# Patient Record
Sex: FEMALE | Race: WHITE | NOT HISPANIC OR LATINO | Employment: UNEMPLOYED | URBAN - METROPOLITAN AREA
[De-identification: names, ages, dates, MRNs, and addresses within clinical notes are randomized per-mention and may not be internally consistent; named-entity substitution may affect disease eponyms.]

---

## 2017-07-13 ENCOUNTER — HOSPITAL ENCOUNTER (EMERGENCY)
Facility: HOSPITAL | Age: 32
Discharge: HOME | End: 2017-07-13
Attending: EMERGENCY MEDICINE
Payer: MEDICAID

## 2017-07-13 VITALS
BODY MASS INDEX: 23 KG/M2 | OXYGEN SATURATION: 100 % | WEIGHT: 125 LBS | HEIGHT: 62 IN | TEMPERATURE: 98 F | HEART RATE: 61 BPM | DIASTOLIC BLOOD PRESSURE: 59 MMHG | RESPIRATION RATE: 18 BRPM | SYSTOLIC BLOOD PRESSURE: 117 MMHG

## 2017-07-13 DIAGNOSIS — T14.90XA TRAUMA: ICD-10-CM

## 2017-07-13 DIAGNOSIS — T07.XXXA MULTIPLE CONTUSIONS: Primary | ICD-10-CM
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
## 2017-07-13 PROCEDURE — 63600175 PHARM REV CODE 636 W HCPCS: Performed by: NURSE PRACTITIONER

## 2017-07-13 PROCEDURE — 99285 EMERGENCY DEPT VISIT HI MDM: CPT | Mod: 25

## 2017-07-13 PROCEDURE — 96372 THER/PROPH/DIAG INJ SC/IM: CPT

## 2017-07-13 RX ORDER — HYDROXYZINE HYDROCHLORIDE 10 MG/1
10 TABLET, FILM COATED ORAL 3 TIMES DAILY PRN
Qty: 20 TABLET | Refills: 0 | Status: SHIPPED | OUTPATIENT
Start: 2017-07-13 | End: 2017-10-06

## 2017-07-13 RX ORDER — IBUPROFEN 800 MG/1
800 TABLET ORAL EVERY 8 HOURS PRN
Qty: 30 TABLET | Refills: 0 | Status: SHIPPED | OUTPATIENT
Start: 2017-07-13 | End: 2017-10-06

## 2017-07-13 RX ORDER — METHOCARBAMOL 500 MG/1
1000 TABLET, FILM COATED ORAL 3 TIMES DAILY
Qty: 30 TABLET | Refills: 0 | Status: SHIPPED | OUTPATIENT
Start: 2017-07-13 | End: 2017-07-18

## 2017-07-13 RX ORDER — HYDROMORPHONE HYDROCHLORIDE 2 MG/ML
0.5 INJECTION, SOLUTION INTRAMUSCULAR; INTRAVENOUS; SUBCUTANEOUS
Status: COMPLETED | OUTPATIENT
Start: 2017-07-13 | End: 2017-07-13

## 2017-07-13 RX ADMIN — HYDROMORPHONE HYDROCHLORIDE 0.5 MG: 2 INJECTION INTRAMUSCULAR; INTRAVENOUS; SUBCUTANEOUS at 01:07

## 2017-07-13 NOTE — ED PROVIDER NOTES
Encounter Date: 7/13/2017    SCRIBE #1 NOTE: I, Alcon Lazaro, am scribing for, and in the presence of, Kait Rubalcava NP.       History     Chief Complaint   Patient presents with    Fall     Pt states she has right-sided stiffness due to a fall yesterday.  No LOC.  Pt ambulatory     CC: Fall    HPI: This 32 y.o. Female with hx of asthma, migraines, and anxiety presents to ED for evaluation of symptoms stemming from a fall yesterday. Pt was in her home when she tripped on detached carpet. Pt is experiencing lower back pain, neck pain, and R foot pain. No tx attempted. Pt denies LOC, cough, fever, and diaphoresis. No other symptoms reported. No alleviating factors.     LMP: last month about this time      The history is provided by the patient. No  was used.     Review of patient's allergies indicates:  No Known Allergies  Past Medical History:   Diagnosis Date    Anxiety     Asthma     as a child, no recent episodes    Mental disorder     Migraine headache     Smoker     Umbilical hernia     Vaginal delivery     x4     Past Surgical History:   Procedure Laterality Date    DILATION AND CURETTAGE OF UTERUS      HYSTEROSCOPY      TUBAL LIGATION      VAGINAL DELIVERY      x 4     Family History   Problem Relation Age of Onset    Cancer Neg Hx     Eclampsia Neg Hx      Social History   Substance Use Topics    Smoking status: Current Every Day Smoker     Packs/day: 1.00     Years: 5.00    Smokeless tobacco: Never Used    Alcohol use Yes      Comment: socially     Review of Systems   Constitutional: Negative for diaphoresis and fever.   HENT: Negative for sore throat.    Respiratory: Negative for cough and shortness of breath.    Cardiovascular: Negative for chest pain.   Gastrointestinal: Negative for nausea.   Genitourinary: Negative for dysuria.   Musculoskeletal: Positive for back pain (lower) and neck pain.        (+) R foot pain   Skin: Negative for rash.   Neurological:  Negative for syncope and weakness.   Hematological: Does not bruise/bleed easily.       Physical Exam     Initial Vitals [07/13/17 1031]   BP Pulse Resp Temp SpO2   128/72 98 18 98.4 °F (36.9 °C) 99 %      MAP       90.67         Physical Exam    Nursing note and vitals reviewed.  Constitutional: She appears well-developed and well-nourished.   HENT:   Head: Normocephalic.   Positive ecchymosis to right frontal area.   Eyes: Conjunctivae and EOM are normal. Pupils are equal, round, and reactive to light.   Neck: Normal range of motion. Neck supple.   Negative C-spine point tenderness.   Pulmonary/Chest: She exhibits no tenderness.   Abdominal: Soft. There is no tenderness.   Musculoskeletal: Normal range of motion. She exhibits edema and tenderness.   Patient has 6 centimeter area of ecchymosis to the right mid back.    Ecchymosis and mild swelling to bilateral lower legs.  Lateral left ankle has positive swelling, ecchymosis, and abrasion.  Right foot has ecchymosis over the dorsum and hematoma to the distal third toe.    Bilateral forearms have circumferential ecchymosis.    Neurological: She is alert and oriented to person, place, and time. She has normal strength and normal reflexes. She displays normal reflexes. No cranial nerve deficit or sensory deficit.   Skin: Skin is warm and dry. Capillary refill takes less than 2 seconds.         ED Course   Procedures  Labs Reviewed - No data to display          Medical Decision Making:   Initial Assessment:   32-year-old female presents with multiple areas of pain and ecchymosis after slipping on carpet and falling last p.m.  Differential Diagnosis:   Muscle strain  Fracture  Contusion  ED Management:  Pts exam was positive for diffuse contusions to include frontal area mid right back, circumferentially to the forearms bilateral lower shins right foot and left ankle.  Patient moves all extremities.  5 out of 5 strength throughout.  There is no focal neural deficit.   "Deep tendon reflexes within normal limits.    xrays and CT were reviewed and discussed with pt.     Based on exam today and the patterns of injuries I question the patient's report of slipping on her carpet and falling.  I attempted to discuss this with patient to include asking her if she was safe.  Patient continued to states she just slipped on "carpet that moves".    I will discharge patient with Robaxin and Motrin.  If given her injection of Dilaudid for pain.    Pt verbalizes understanding of d/c instructions and will return for worsening condition.    Case discussed with attending who agrees with assessment and plan.               Scribe Attestation:   Scribe #1: I performed the above scribed service and the documentation accurately describes the services I performed. I attest to the accuracy of the note.    Attending Attestation:           Physician Attestation for Scribe:  Physician Attestation Statement for Scribe #1: I, Kait Rubalcava NP, reviewed documentation, as scribed by Alcon Willis in my presence, and it is both accurate and complete.                 ED Course     Clinical Impression:   The primary encounter diagnosis was Multiple contusions. Diagnoses of Trauma and Fall, initial encounter were also pertinent to this visit.    Disposition:   Disposition: Discharged  Condition: Stable                        Kait Rubalcava NP  07/13/17 1419    "

## 2017-07-13 NOTE — ED TRIAGE NOTES
Pt c/o of RIGHT leg and foot pain. Lower back pain, neck pain after a fall down the stairs yesterday. Denies any LOC

## 2017-10-06 ENCOUNTER — OFFICE VISIT (OUTPATIENT)
Dept: OBSTETRICS AND GYNECOLOGY | Facility: CLINIC | Age: 32
End: 2017-10-06
Payer: MEDICAID

## 2017-10-06 VITALS
SYSTOLIC BLOOD PRESSURE: 110 MMHG | DIASTOLIC BLOOD PRESSURE: 64 MMHG | BODY MASS INDEX: 21.76 KG/M2 | HEIGHT: 63 IN | WEIGHT: 122.81 LBS | TEMPERATURE: 99 F

## 2017-10-06 DIAGNOSIS — Z01.419 WELL WOMAN EXAM WITH ROUTINE GYNECOLOGICAL EXAM: Primary | ICD-10-CM

## 2017-10-06 PROCEDURE — 87591 N.GONORRHOEAE DNA AMP PROB: CPT

## 2017-10-06 PROCEDURE — 99999 PR PBB SHADOW E&M-EST. PATIENT-LVL III: CPT | Mod: PBBFAC,,, | Performed by: OBSTETRICS & GYNECOLOGY

## 2017-10-06 PROCEDURE — 99395 PREV VISIT EST AGE 18-39: CPT | Mod: S$PBB,,, | Performed by: OBSTETRICS & GYNECOLOGY

## 2017-10-06 PROCEDURE — 99213 OFFICE O/P EST LOW 20 MIN: CPT | Mod: PBBFAC | Performed by: OBSTETRICS & GYNECOLOGY

## 2017-10-06 NOTE — PROGRESS NOTES
Subjective:       Patient ID: Karen Mccall is a 32 y.o. female.    Chief Complaint:  Gynecologic Exam (Last normal pap was 10/06/16)      History of Present Illness  HPI  Annual Exam-Premenopausal  Patient presents for annual exam. The patient has no complaints today. The patient is sexually active. GYN screening history: last pap: approximate date 10/6/2016 and was normal. The patient wears seatbelts: yes. The patient participates in regular exercise: yes. Has the patient ever been transfused or tattooed?: yes. The patient reports that there is not domestic violence in her life.    She is status post tubal ligation  Status post umbilical hernia repair      GYN & OB HistoryPatient's last menstrual period was 2017 (exact date).   Date of Last Pap: 10/12/2016    OB History    Para Term  AB Living   5 4 4   1 4   SAB TAB Ectopic Multiple Live Births   1     0 4      # Outcome Date GA Lbr Kemar/2nd Weight Sex Delivery Anes PTL Lv   5 Term 14 39w2d  2.432 kg (5 lb 5.8 oz) F Vag-Spont EPI N MAYDA   4 Term 10/03/12 39w0d  3.164 kg (6 lb 15.6 oz) M INDUCTION EPI N MAYDA   3 Term 05   3.317 kg (7 lb 5 oz) M Vag-Spont EPI N MAYDA   2 Term 03   3.317 kg (7 lb 5 oz) M Vag-Spont EPI N MAYDA   1 SAB 02                Past Medical History:   Diagnosis Date    Anxiety     Asthma     as a child, no recent episodes    Mental disorder     Migraine headache     Smoker     Umbilical hernia     Vaginal delivery     x4       Past Surgical History:   Procedure Laterality Date    DILATION AND CURETTAGE OF UTERUS      HYSTEROSCOPY      TUBAL LIGATION      VAGINAL DELIVERY      x 4       Family History   Problem Relation Age of Onset    Cancer Neg Hx     Eclampsia Neg Hx        Social History     Social History    Marital status: Single     Spouse name: N/A    Number of children: N/A    Years of education: N/A     Social History Main Topics    Smoking status: Current Every Day Smoker      Packs/day: 1.00     Years: 5.00    Smokeless tobacco: Never Used    Alcohol use Yes      Comment: socially    Drug use: No    Sexual activity: Yes     Partners: Male     Birth control/ protection: None     Other Topics Concern    None     Social History Narrative    Together since 2011    She is a homemaker.               Current Outpatient Prescriptions   Medication Sig Dispense Refill    amoxicillin (AMOXIL) 875 MG tablet Take 1 tablet (875 mg total) by mouth every 12 (twelve) hours. 20 tablet 0    benzonatate (TESSALON) 200 MG capsule Take 1 capsule (200 mg total) by mouth 3 (three) times daily as needed for Cough. 20 capsule 1    butalbital-acetaminophen-caffeine -40 mg (FIORICET, ESGIC) -40 mg per tablet Take 1 tablet by mouth every 12 (twelve) hours as needed for Pain (headache). 30 tablet 1    clonazePAM (KLONOPIN) 1 MG tablet 1-2 tablets PO QD prn anxiety 45 tablet 1    cyproheptadine (PERIACTIN) 4 mg tablet Take 1 tablet (4 mg total) by mouth 3 (three) times daily as needed. 60 tablet 2    escitalopram oxalate (LEXAPRO) 20 MG tablet Take 1 tablet (20 mg total) by mouth once daily. 30 tablet 3    fluticasone (FLONASE) 50 mcg/actuation nasal spray 1-2 sprays by Each Nare route once daily. 1 Bottle 0    gabapentin (NEURONTIN) 300 MG capsule Take 1 capsule (300 mg total) by mouth 2 (two) times daily. 60 capsule 5    meclizine (ANTIVERT) 25 mg tablet Take 1 tablet (25 mg total) by mouth 3 (three) times daily as needed. 30 tablet 2    topiramate (TOPAMAX) 25 MG tablet Take 1 tablet (25 mg total) by mouth 2 (two) times daily. 60 tablet 3     No current facility-administered medications for this visit.        Review of patient's allergies indicates:  No Known Allergies    Review of Systems  Review of Systems   Constitutional: Negative for activity change, appetite change, chills, fatigue, fever and unexpected weight change.   HENT: Negative for mouth sores.    Respiratory: Negative for  cough, shortness of breath and wheezing.    Cardiovascular: Negative for chest pain and palpitations.   Gastrointestinal: Negative for abdominal pain, bloating, blood in stool, constipation, nausea and vomiting.   Endocrine: Negative for diabetes and hot flashes.   Genitourinary: Negative for dyspareunia, dysuria, frequency, hematuria, menorrhagia, menstrual problem, pelvic pain, urgency, vaginal bleeding, vaginal discharge, vaginal pain, dysmenorrhea, urinary incontinence, postcoital bleeding and vaginal odor.   Musculoskeletal: Negative for back pain and myalgias.   Skin:  Negative for rash.   Neurological: Negative for seizures and headaches.   Psychiatric/Behavioral: Negative for depression and sleep disturbance. The patient is not nervous/anxious.    Breast: Negative for breast mass, breast pain and nipple discharge          Objective:    Physical Exam:   Constitutional: She appears well-developed and well-nourished. No distress.    HENT:   Head: Normocephalic and atraumatic.    Eyes: EOM are normal.    Neck: Normal range of motion.     Pulmonary/Chest: Effort normal. No respiratory distress.   Breasts: Non-tender, no engorgement, no masses, no retraction, no discharge. Negative for lymphadenopathy.         Abdominal: Soft. She exhibits no distension. There is no tenderness. There is no rebound and no guarding.     Genitourinary: Vagina normal and uterus normal. No vaginal discharge found.   Genitourinary Comments: Vulva without any obvious lesions.  Vaginal vault with good support.  Minimal discharge noted.  No obvious lesion.  Cervix is without any cervical motion tenderness.  No obvious lesion.  Uterus is small, non-tender, normal contour.  Adnexa is without any masses or tenderness.           Musculoskeletal: Normal range of motion.       Neurological: She is alert.    Skin: Skin is warm and dry.    Psychiatric: She has a normal mood and affect.          Assessment:        1. Well woman exam with routine  gynecological exam             Plan:          I have discussed with the patient her condition.  Monthly breast examination was instructed, discussed, and encouraged.  Patient was encouraged to consume a low-calorie, low fat diet, and to increase of physical activity.  Healthy habits encouraged.  A Pap smear was NOT performed.  Mammogram was not ordered because of the combination of her age and risk factors.  Gonorrhea and Chlamydia testing performed.  HIV test declined.  She will come back to see me in one year for her annual visit.  She can come back to see me sooner as necessary.  All of her questions were answered appropriately to her satisfaction.

## 2017-10-08 LAB
C TRACH DNA SPEC QL NAA+PROBE: NOT DETECTED
N GONORRHOEA DNA SPEC QL NAA+PROBE: NOT DETECTED

## 2017-11-13 ENCOUNTER — TELEPHONE (OUTPATIENT)
Dept: OBSTETRICS AND GYNECOLOGY | Facility: CLINIC | Age: 32
End: 2017-11-13

## 2017-11-13 NOTE — TELEPHONE ENCOUNTER
11/13/17 @ 1142a ( [facility])  CALL PLACED TO PT TO INFORM HER THAT DR MORENO READ HER RESULTS AND STATED THAT HER TEST ARE NEGATIVE, PT STATED HER UNDERSTANDING

## 2017-11-13 NOTE — TELEPHONE ENCOUNTER
----- Message from Abigail Calderon sent at 11/13/2017  9:22 AM CST -----  Contact: self  Patient is calling for test results and can be reached at [phone].      Thanks,  -----------------------------------------------------------------  11/13/17 @ 0935 ([facility])  SPOKE WITH MS FIELD , SHE IS REQUESTING THE RESULTS FROM HER STD TEST & HER PAP SMEAR, INFORMED HER THAT DR MORENO HAS NOT READ OR  RELEASED THOSE TEST AS YET , WILL SEND HIM A MESSAGE THAT SHE IS REQUESTING THOSE RESULTS  MESSAGE SENT TO DR MORENO & JESSENIA

## 2017-11-29 ENCOUNTER — HOSPITAL ENCOUNTER (EMERGENCY)
Facility: HOSPITAL | Age: 32
Discharge: PSYCHIATRIC OR SUBSTANCE USE FACILITY | End: 2017-11-29
Attending: EMERGENCY MEDICINE
Payer: MEDICAID

## 2017-11-29 VITALS
HEART RATE: 68 BPM | BODY MASS INDEX: 20.89 KG/M2 | DIASTOLIC BLOOD PRESSURE: 68 MMHG | HEIGHT: 66 IN | SYSTOLIC BLOOD PRESSURE: 102 MMHG | RESPIRATION RATE: 14 BRPM | TEMPERATURE: 98 F | OXYGEN SATURATION: 99 % | WEIGHT: 130 LBS

## 2017-11-29 DIAGNOSIS — R45.851 SUICIDAL IDEATIONS: Primary | ICD-10-CM

## 2017-11-29 LAB
ALBUMIN SERPL BCP-MCNC: 3.8 G/DL
ALP SERPL-CCNC: 50 U/L
ALT SERPL W/O P-5'-P-CCNC: 12 U/L
AMORPH CRY URNS QL MICRO: ABNORMAL
AMPHET+METHAMPHET UR QL: NEGATIVE
ANION GAP SERPL CALC-SCNC: 7 MMOL/L
APAP SERPL-MCNC: 5 UG/ML
AST SERPL-CCNC: 22 U/L
B-HCG UR QL: NEGATIVE
BACTERIA #/AREA URNS HPF: ABNORMAL /HPF
BARBITURATES UR QL SCN>200 NG/ML: NORMAL
BASOPHILS # BLD AUTO: 0.04 K/UL
BASOPHILS NFR BLD: 0.5 %
BENZODIAZ UR QL SCN>200 NG/ML: NEGATIVE
BILIRUB SERPL-MCNC: 0.6 MG/DL
BILIRUB UR QL STRIP: NEGATIVE
BUN SERPL-MCNC: 13 MG/DL
BZE UR QL SCN: NORMAL
CALCIUM SERPL-MCNC: 9.3 MG/DL
CANNABINOIDS UR QL SCN: NORMAL
CHLORIDE SERPL-SCNC: 109 MMOL/L
CLARITY UR: ABNORMAL
CO2 SERPL-SCNC: 28 MMOL/L
COLOR UR: YELLOW
CREAT SERPL-MCNC: 1 MG/DL
CREAT UR-MCNC: 100.2 MG/DL
DIFFERENTIAL METHOD: ABNORMAL
EOSINOPHIL # BLD AUTO: 0.1 K/UL
EOSINOPHIL NFR BLD: 1.6 %
ERYTHROCYTE [DISTWIDTH] IN BLOOD BY AUTOMATED COUNT: 13.8 %
EST. GFR  (AFRICAN AMERICAN): >60 ML/MIN/1.73 M^2
EST. GFR  (NON AFRICAN AMERICAN): >60 ML/MIN/1.73 M^2
ETHANOL SERPL-MCNC: <10 MG/DL
GLUCOSE SERPL-MCNC: 78 MG/DL
GLUCOSE UR QL STRIP: NEGATIVE
HCT VFR BLD AUTO: 33.6 %
HGB BLD-MCNC: 11.1 G/DL
HGB UR QL STRIP: NEGATIVE
HYALINE CASTS #/AREA URNS LPF: 0 /LPF
KETONES UR QL STRIP: NEGATIVE
LEUKOCYTE ESTERASE UR QL STRIP: NEGATIVE
LYMPHOCYTES # BLD AUTO: 2.5 K/UL
LYMPHOCYTES NFR BLD: 33.5 %
MCH RBC QN AUTO: 29.8 PG
MCHC RBC AUTO-ENTMCNC: 33 G/DL
MCV RBC AUTO: 90 FL
METHADONE UR QL SCN>300 NG/ML: NEGATIVE
MICROSCOPIC COMMENT: ABNORMAL
MONOCYTES # BLD AUTO: 0.4 K/UL
MONOCYTES NFR BLD: 5.6 %
NEUTROPHILS # BLD AUTO: 4.3 K/UL
NEUTROPHILS NFR BLD: 58.8 %
NITRITE UR QL STRIP: NEGATIVE
OPIATES UR QL SCN: NEGATIVE
PCP UR QL SCN>25 NG/ML: NEGATIVE
PH UR STRIP: 6 [PH] (ref 5–8)
PLATELET # BLD AUTO: 233 K/UL
PMV BLD AUTO: 10.1 FL
POTASSIUM SERPL-SCNC: 3.6 MMOL/L
PROT SERPL-MCNC: 6.3 G/DL
PROT UR QL STRIP: ABNORMAL
RBC # BLD AUTO: 3.73 M/UL
RBC #/AREA URNS HPF: 1 /HPF (ref 0–4)
SODIUM SERPL-SCNC: 144 MMOL/L
SP GR UR STRIP: 1.02 (ref 1–1.03)
SQUAMOUS #/AREA URNS HPF: 15 /HPF
TOXICOLOGY INFORMATION: NORMAL
TSH SERPL DL<=0.005 MIU/L-ACNC: 1.79 UIU/ML
URN SPEC COLLECT METH UR: ABNORMAL
UROBILINOGEN UR STRIP-ACNC: NEGATIVE EU/DL
WBC # BLD AUTO: 7.38 K/UL
WBC #/AREA URNS HPF: 1 /HPF (ref 0–5)

## 2017-11-29 PROCEDURE — 25000003 PHARM REV CODE 250: Performed by: EMERGENCY MEDICINE

## 2017-11-29 PROCEDURE — 85025 COMPLETE CBC W/AUTO DIFF WBC: CPT

## 2017-11-29 PROCEDURE — 81000 URINALYSIS NONAUTO W/SCOPE: CPT

## 2017-11-29 PROCEDURE — 63600175 PHARM REV CODE 636 W HCPCS: Performed by: EMERGENCY MEDICINE

## 2017-11-29 PROCEDURE — 80329 ANALGESICS NON-OPIOID 1 OR 2: CPT

## 2017-11-29 PROCEDURE — 84443 ASSAY THYROID STIM HORMONE: CPT

## 2017-11-29 PROCEDURE — 99285 EMERGENCY DEPT VISIT HI MDM: CPT | Mod: 25

## 2017-11-29 PROCEDURE — 80320 DRUG SCREEN QUANTALCOHOLS: CPT

## 2017-11-29 PROCEDURE — 81025 URINE PREGNANCY TEST: CPT

## 2017-11-29 PROCEDURE — 80053 COMPREHEN METABOLIC PANEL: CPT

## 2017-11-29 PROCEDURE — 80307 DRUG TEST PRSMV CHEM ANLYZR: CPT

## 2017-11-29 PROCEDURE — 96372 THER/PROPH/DIAG INJ SC/IM: CPT

## 2017-11-29 RX ORDER — DIPHENHYDRAMINE HYDROCHLORIDE 50 MG/ML
25 INJECTION INTRAMUSCULAR; INTRAVENOUS
Status: COMPLETED | OUTPATIENT
Start: 2017-11-29 | End: 2017-11-29

## 2017-11-29 RX ORDER — ZIPRASIDONE MESYLATE 20 MG/ML
20 INJECTION, POWDER, LYOPHILIZED, FOR SOLUTION INTRAMUSCULAR
Status: COMPLETED | OUTPATIENT
Start: 2017-11-29 | End: 2017-11-29

## 2017-11-29 RX ORDER — LORAZEPAM 0.5 MG/1
2 TABLET ORAL
Status: COMPLETED | OUTPATIENT
Start: 2017-11-29 | End: 2017-11-29

## 2017-11-29 RX ORDER — ACETAMINOPHEN 500 MG
1000 TABLET ORAL
Status: COMPLETED | OUTPATIENT
Start: 2017-11-29 | End: 2017-11-29

## 2017-11-29 RX ADMIN — LORAZEPAM 2 MG: 0.5 TABLET ORAL at 05:11

## 2017-11-29 RX ADMIN — DIPHENHYDRAMINE HYDROCHLORIDE 25 MG: 50 INJECTION, SOLUTION INTRAMUSCULAR; INTRAVENOUS at 06:11

## 2017-11-29 RX ADMIN — ZIPRASIDONE MESYLATE 20 MG: 20 INJECTION, POWDER, LYOPHILIZED, FOR SOLUTION INTRAMUSCULAR at 06:11

## 2017-11-29 RX ADMIN — ZIPRASIDONE MESYLATE 20 MG: 20 INJECTION, POWDER, LYOPHILIZED, FOR SOLUTION INTRAMUSCULAR at 08:11

## 2017-11-29 RX ADMIN — ACETAMINOPHEN 1000 MG: 500 TABLET ORAL at 05:11

## 2017-11-29 NOTE — ED NOTES
PT WAS GIVEN PHONE TO CALL HER KIDS FATHER TO MAKE ARRANGEMENTS FOR HER KIDS. PT WAS INFORMED THAT SHE ONLY HAS 5 MINS TO USE PHONE.

## 2017-11-29 NOTE — ED NOTES
"Pt states "I do not want any of my family here or to know I am here, they put me here because of my anxiety".   "
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
## 2017-11-29 NOTE — ED NOTES
Pt requested something for anxiety and pain for her pain in her back. Dr. Meza verbalized understanding.

## 2017-11-29 NOTE — ED NOTES
PT WAS INFORMED OF PEC STATUS . PT AT THIS TIME IS TEARFUL STATING SHE WANTS TO GO HOME TO HER KIDS. SHE HAS NOBODY TO TAKE CARE OF THEM.

## 2017-11-30 NOTE — ED NOTES
Patient up and trying to get out of bed to go smoke; redirected and explained that she is not able to smoke; tearful and redirected;

## 2017-11-30 NOTE — ED NOTES
Pt resting comfortably in bed. In no acute distress. Side rails x 2. Pt eating small bites of meal tray at a time. Will continue to monitor.

## 2017-11-30 NOTE — ED NOTES
Pt. Accepted at [facility], Dr. Ahmadi. Pt. Will be going to [facility] [address]. Report to be called in 30 minutes to [phone] ext. [phone].

## 2017-11-30 NOTE — ED NOTES
MD notified of patient agitated and getting out of bed; new orders to come; vss; patient very tearful; discussed with MD the pregnancy test and ok to change to lab test as they have urine already;

## 2017-11-30 NOTE — ED NOTES
Report called to Francoise at [facility] [phone] ext [phone]; Dr. Ahmadi is accepting

## 2017-11-30 NOTE — ED NOTES
Pt. States she needs to walk around room because her legs hurt, however I advised her to stay on stretcher as she is slurring her words and I believe she could be a fall risk if she gets up.

## 2018-01-12 ENCOUNTER — TELEPHONE (OUTPATIENT)
Dept: OBSTETRICS AND GYNECOLOGY | Facility: CLINIC | Age: 33
End: 2018-01-12

## 2018-01-12 NOTE — TELEPHONE ENCOUNTER
----- Message from Yoli Moffett sent at 1/12/2018 12:03 PM CST -----  Contact: self  Patient requests to speak with staff regarding her pap smear. She can be reached at [phone]. Thank you!

## 2018-02-20 ENCOUNTER — OFFICE VISIT (OUTPATIENT)
Dept: OBSTETRICS AND GYNECOLOGY | Facility: CLINIC | Age: 33
End: 2018-02-20
Payer: MEDICAID

## 2018-02-20 VITALS
WEIGHT: 128.31 LBS | BODY MASS INDEX: 22.73 KG/M2 | SYSTOLIC BLOOD PRESSURE: 110 MMHG | HEIGHT: 63 IN | TEMPERATURE: 99 F | DIASTOLIC BLOOD PRESSURE: 66 MMHG

## 2018-02-20 DIAGNOSIS — R53.83 FATIGUE, UNSPECIFIED TYPE: ICD-10-CM

## 2018-02-20 DIAGNOSIS — Z01.419 WELL WOMAN EXAM WITH ROUTINE GYNECOLOGICAL EXAM: Primary | ICD-10-CM

## 2018-02-20 PROCEDURE — 99213 OFFICE O/P EST LOW 20 MIN: CPT | Mod: PBBFAC | Performed by: OBSTETRICS & GYNECOLOGY

## 2018-02-20 PROCEDURE — 99395 PREV VISIT EST AGE 18-39: CPT | Mod: S$PBB,,, | Performed by: OBSTETRICS & GYNECOLOGY

## 2018-02-20 PROCEDURE — 99999 PR PBB SHADOW E&M-EST. PATIENT-LVL III: CPT | Mod: PBBFAC,,, | Performed by: OBSTETRICS & GYNECOLOGY

## 2018-02-20 NOTE — PROGRESS NOTES
Subjective:       Patient ID: Karen Mccall is a 32 y.o. female.    Chief Complaint:  Gynecologic Exam (Last normal pap was 10/06/16)      History of Present Illness  HPI  Annual Exam-Premenopausal  Patient presents for annual exam. The patient has no complaints today. The patient is sexually active. GYN screening history: last pap: approximate date 10/6/2016 and was normal. The patient wears seatbelts: yes. The patient participates in regular exercise: no. Has the patient ever been transfused or tattooed?: yes. The patient reports that there is not domestic violence in her life.    Status post laparoscopic tubal cauterization.  Regret!  Would like to have another child.    Being treated for anxiety and depression  Was doing well on Lexapro.  But switched to Paxil and Vistaril recently.  Does not like it.    Tired all the time.  Wondering if she is anemic.        GYN & OB History  Patient's last menstrual period was 2018 (exact date).   Date of Last Pap: 10/12/2016    OB History    Para Term  AB Living   5 4 4   1 4   SAB TAB Ectopic Multiple Live Births   1     0 4      # Outcome Date GA Lbr Kemar/2nd Weight Sex Delivery Anes PTL Lv   5 Term 14 39w2d  2.432 kg (5 lb 5.8 oz) F Vag-Spont EPI N MAYDA   4 Term 10/03/12 39w0d  3.164 kg (6 lb 15.6 oz) M INDUCTION EPI N MAYDA   3 Term 05   3.317 kg (7 lb 5 oz) M Vag-Spont EPI N MAYDA   2 Term 03   3.317 kg (7 lb 5 oz) M Vag-Spont EPI N MAYDA   1 SAB 02                Past Medical History:   Diagnosis Date    Anxiety     Asthma     as a child, no recent episodes    Mental disorder     Migraine headache     Smoker     Umbilical hernia     Vaginal delivery     x4       Past Surgical History:   Procedure Laterality Date    DILATION AND CURETTAGE OF UTERUS      HYSTEROSCOPY      TUBAL LIGATION      VAGINAL DELIVERY      x 4       Family History   Problem Relation Age of Onset    Cancer Neg Hx     Eclampsia Neg Hx        Social  History     Social History    Marital status: Single     Spouse name: N/A    Number of children: N/A    Years of education: N/A     Social History Main Topics    Smoking status: Current Every Day Smoker     Packs/day: 1.00     Years: 5.00    Smokeless tobacco: Never Used    Alcohol use Yes      Comment: socially    Drug use: No    Sexual activity: Yes     Partners: Male     Birth control/ protection: None     Other Topics Concern    None     Social History Narrative    Together since 2011    She is a homemaker.               Current Outpatient Prescriptions   Medication Sig Dispense Refill    butalbital-acetaminophen-caffeine -40 mg (FIORICET, ESGIC) -40 mg per tablet Take 1 tablet by mouth once daily. 30 tablet 0    clonazePAM (KLONOPIN) 0.5 MG tablet 1 tablet PO BID prn anxiety 60 tablet 0    cyclobenzaprine (FLEXERIL) 5 MG tablet Take 1 tablet (5 mg total) by mouth nightly as needed. 30 tablet 1    cyproheptadine (PERIACTIN) 4 mg tablet Take 1 tablet (4 mg total) by mouth 3 (three) times daily as needed. 60 tablet 2    fluticasone (FLONASE) 50 mcg/actuation nasal spray 1-2 sprays by Each Nare route once daily. 1 Bottle 0    gabapentin (NEURONTIN) 300 MG capsule Take 1 capsule (300 mg total) by mouth 2 (two) times daily. 60 capsule 5    hydrOXYzine pamoate (VISTARIL) 25 MG Cap Take 1 capsule (25 mg total) by mouth every 8 (eight) hours as needed. 90 capsule 1    meclizine (ANTIVERT) 25 mg tablet Take 1 tablet (25 mg total) by mouth 3 (three) times daily as needed. 30 tablet 2    naproxen (NAPROSYN) 500 MG tablet Take 1 tablet (500 mg total) by mouth 2 (two) times daily with meals. 30 tablet 1    paroxetine (PAXIL) 20 MG tablet Take 1 tablet (20 mg total) by mouth every morning. 30 tablet 5    topiramate (TOPAMAX) 25 MG tablet Take 1 tablet (25 mg total) by mouth 2 (two) times daily. 60 tablet 3     No current facility-administered medications for this visit.        Review of  patient's allergies indicates:  No Known Allergies     Review of Systems  Review of Systems   Constitutional: Negative for activity change, appetite change, chills, fatigue, fever and unexpected weight change.   HENT: Negative for mouth sores.    Respiratory: Negative for cough, shortness of breath and wheezing.    Cardiovascular: Negative for chest pain and palpitations.   Gastrointestinal: Negative for abdominal pain, bloating, blood in stool, constipation, nausea and vomiting.   Endocrine: Negative for diabetes and hot flashes.   Genitourinary: Negative for dyspareunia, dysuria, frequency, hematuria, menorrhagia, menstrual problem, pelvic pain, urgency, vaginal bleeding, vaginal discharge, vaginal pain, dysmenorrhea, urinary incontinence, postcoital bleeding and vaginal odor.   Musculoskeletal: Negative for back pain and myalgias.   Skin:  Negative for rash.   Neurological: Negative for seizures and headaches.   Psychiatric/Behavioral: Positive for depression. Negative for sleep disturbance. The patient is nervous/anxious.    Breast: Negative for breast mass, breast pain and nipple discharge          Objective:    Physical Exam:   Constitutional: She appears well-developed and well-nourished. No distress.    HENT:   Head: Normocephalic and atraumatic.    Eyes: EOM are normal.    Neck: Normal range of motion.     Pulmonary/Chest: Effort normal. No respiratory distress.   Breasts: Non-tender, no engorgement, no masses, no retraction, no discharge. Negative for lymphadenopathy.         Abdominal: Soft. She exhibits no distension. There is no tenderness. There is no rebound and no guarding.     Genitourinary: Vagina normal and uterus normal. No vaginal discharge found.   Genitourinary Comments: Vulva without any obvious lesions.  Vaginal vault with good support.  Minimal discharge noted.  No obvious lesion.  Cervix is without any cervical motion tenderness.  No obvious lesion.  Uterus is small, non-tender, normal  contour.  Adnexa is without any masses or tenderness.           Musculoskeletal: Normal range of motion.       Neurological: She is alert.    Skin: Skin is warm and dry.    Psychiatric: She has a normal mood and affect.          Assessment:        1. Well woman exam with routine gynecological exam    2. Fatigue, unspecified type             Plan:          I have discussed with the patient her condition.  Monthly breast examination was instructed, discussed, and encouraged.  Patient was encouraged to consume a low-calorie, low fat diet, and to increase of physical activity.  Healthy habits encouraged.  A Pap smear was NOT performed.  Mammogram was not ordered because of the combination of her age and risk factors.  Gonorrhea and Chlamydia testing not performed.  HIV test not ordered.  Patient is to continue her medications as prescribed.  She will come back to see me in one year for her annual visit.  She can come back to see me sooner as necessary.  All of her questions were answered appropriately to her satisfaction.     CBC, CMP, TSH,T4,Vitamin D and lipid profile ordered  We will call her for results

## 2018-02-21 ENCOUNTER — LAB VISIT (OUTPATIENT)
Dept: LAB | Facility: HOSPITAL | Age: 33
End: 2018-02-21
Attending: OBSTETRICS & GYNECOLOGY
Payer: MEDICAID

## 2018-02-21 DIAGNOSIS — R53.83 FATIGUE, UNSPECIFIED TYPE: ICD-10-CM

## 2018-02-21 DIAGNOSIS — Z01.419 WELL WOMAN EXAM WITH ROUTINE GYNECOLOGICAL EXAM: ICD-10-CM

## 2018-02-21 LAB
25(OH)D3+25(OH)D2 SERPL-MCNC: 38 NG/ML
ALBUMIN SERPL BCP-MCNC: 3.5 G/DL
ALP SERPL-CCNC: 142 U/L
ALT SERPL W/O P-5'-P-CCNC: 71 U/L
ANION GAP SERPL CALC-SCNC: 5 MMOL/L
AST SERPL-CCNC: 59 U/L
BASOPHILS # BLD AUTO: 0.03 K/UL
BASOPHILS NFR BLD: 0.7 %
BILIRUB SERPL-MCNC: 0.2 MG/DL
BUN SERPL-MCNC: 13 MG/DL
CALCIUM SERPL-MCNC: 9.4 MG/DL
CHLORIDE SERPL-SCNC: 108 MMOL/L
CHOLEST SERPL-MCNC: 102 MG/DL
CHOLEST/HDLC SERPL: 4.1 {RATIO}
CO2 SERPL-SCNC: 28 MMOL/L
CREAT SERPL-MCNC: 0.9 MG/DL
DIFFERENTIAL METHOD: ABNORMAL
EOSINOPHIL # BLD AUTO: 0.1 K/UL
EOSINOPHIL NFR BLD: 2.4 %
ERYTHROCYTE [DISTWIDTH] IN BLOOD BY AUTOMATED COUNT: 13 %
EST. GFR  (AFRICAN AMERICAN): >60 ML/MIN/1.73 M^2
EST. GFR  (NON AFRICAN AMERICAN): >60 ML/MIN/1.73 M^2
GLUCOSE SERPL-MCNC: 68 MG/DL
HCT VFR BLD AUTO: 33.8 %
HDLC SERPL-MCNC: 25 MG/DL
HDLC SERPL: 24.5 %
HGB BLD-MCNC: 10.9 G/DL
LDLC SERPL CALC-MCNC: 61.6 MG/DL
LYMPHOCYTES # BLD AUTO: 0.9 K/UL
LYMPHOCYTES NFR BLD: 19.7 %
MCH RBC QN AUTO: 29.1 PG
MCHC RBC AUTO-ENTMCNC: 32.2 G/DL
MCV RBC AUTO: 90 FL
MONOCYTES # BLD AUTO: 0.4 K/UL
MONOCYTES NFR BLD: 9.8 %
NEUTROPHILS # BLD AUTO: 3 K/UL
NEUTROPHILS NFR BLD: 67.2 %
NONHDLC SERPL-MCNC: 77 MG/DL
PLATELET # BLD AUTO: 205 K/UL
PMV BLD AUTO: 10 FL
POTASSIUM SERPL-SCNC: 4.5 MMOL/L
PROT SERPL-MCNC: 6.6 G/DL
RBC # BLD AUTO: 3.74 M/UL
SODIUM SERPL-SCNC: 141 MMOL/L
T4 FREE SERPL-MCNC: 0.93 NG/DL
TRIGL SERPL-MCNC: 77 MG/DL
TSH SERPL DL<=0.005 MIU/L-ACNC: 0.59 UIU/ML
WBC # BLD AUTO: 4.51 K/UL

## 2018-02-21 PROCEDURE — 85025 COMPLETE CBC W/AUTO DIFF WBC: CPT

## 2018-02-21 PROCEDURE — 36415 COLL VENOUS BLD VENIPUNCTURE: CPT

## 2018-02-21 PROCEDURE — 80053 COMPREHEN METABOLIC PANEL: CPT

## 2018-02-21 PROCEDURE — 84439 ASSAY OF FREE THYROXINE: CPT

## 2018-02-21 PROCEDURE — 82306 VITAMIN D 25 HYDROXY: CPT

## 2018-02-21 PROCEDURE — 84443 ASSAY THYROID STIM HORMONE: CPT

## 2018-02-21 PROCEDURE — 80061 LIPID PANEL: CPT

## 2018-02-23 ENCOUNTER — TELEPHONE (OUTPATIENT)
Dept: OBSTETRICS AND GYNECOLOGY | Facility: CLINIC | Age: 33
End: 2018-02-23

## 2018-02-23 NOTE — TELEPHONE ENCOUNTER
----- Message from Yoli Moffett sent at 2/22/2018  2:40 PM CST -----  Contact: self  Patient requests to get a copy of her recent labs to bring her PCP. She can be reached at [phone]. Thank you!

## 2018-10-09 ENCOUNTER — HOSPITAL ENCOUNTER (EMERGENCY)
Facility: HOSPITAL | Age: 33
Discharge: HOME | End: 2018-10-09
Attending: EMERGENCY MEDICINE
Payer: MEDICAID

## 2018-10-09 VITALS
DIASTOLIC BLOOD PRESSURE: 66 MMHG | BODY MASS INDEX: 23.21 KG/M2 | HEIGHT: 63 IN | HEART RATE: 84 BPM | RESPIRATION RATE: 18 BRPM | TEMPERATURE: 99 F | WEIGHT: 131 LBS | SYSTOLIC BLOOD PRESSURE: 103 MMHG | OXYGEN SATURATION: 98 %

## 2018-10-09 DIAGNOSIS — N76.0 BACTERIAL VAGINOSIS: Primary | ICD-10-CM

## 2018-10-09 DIAGNOSIS — B96.89 BACTERIAL VAGINOSIS: Primary | ICD-10-CM

## 2018-10-09 DIAGNOSIS — N72 CERVICITIS: ICD-10-CM

## 2018-10-09 DIAGNOSIS — R10.2 SUPRAPUBIC ABDOMINAL PAIN: ICD-10-CM

## 2018-10-09 DIAGNOSIS — N30.01 ACUTE CYSTITIS WITH HEMATURIA: ICD-10-CM

## 2018-10-09 DIAGNOSIS — R11.0 NAUSEA: ICD-10-CM

## 2018-10-09 LAB
ALBUMIN SERPL BCP-MCNC: 4.2 G/DL
ALP SERPL-CCNC: 63 U/L
ALT SERPL W/O P-5'-P-CCNC: 12 U/L
ANION GAP SERPL CALC-SCNC: 9 MMOL/L
AST SERPL-CCNC: 27 U/L
B-HCG UR QL: NEGATIVE
BACTERIA #/AREA URNS HPF: ABNORMAL /HPF
BACTERIA GENITAL QL WET PREP: ABNORMAL
BASOPHILS # BLD AUTO: 0.01 K/UL
BASOPHILS NFR BLD: 0.1 %
BILIRUB SERPL-MCNC: 1.1 MG/DL
BILIRUB UR QL STRIP: NEGATIVE
BUN SERPL-MCNC: 12 MG/DL
CALCIUM SERPL-MCNC: 9.6 MG/DL
CHLORIDE SERPL-SCNC: 106 MMOL/L
CLARITY UR: CLEAR
CLUE CELLS VAG QL WET PREP: ABNORMAL
CO2 SERPL-SCNC: 22 MMOL/L
COLOR UR: YELLOW
CREAT SERPL-MCNC: 0.9 MG/DL
CTP QC/QA: YES
DIFFERENTIAL METHOD: ABNORMAL
EOSINOPHIL # BLD AUTO: 0 K/UL
EOSINOPHIL NFR BLD: 0 %
ERYTHROCYTE [DISTWIDTH] IN BLOOD BY AUTOMATED COUNT: 13.3 %
EST. GFR  (AFRICAN AMERICAN): >60 ML/MIN/1.73 M^2
EST. GFR  (NON AFRICAN AMERICAN): >60 ML/MIN/1.73 M^2
FILAMENT FUNGI VAG WET PREP-#/AREA: ABNORMAL
GLUCOSE SERPL-MCNC: 105 MG/DL
GLUCOSE UR QL STRIP: NEGATIVE
HCT VFR BLD AUTO: 35.8 %
HGB BLD-MCNC: 12.3 G/DL
HGB UR QL STRIP: NEGATIVE
HYALINE CASTS #/AREA URNS LPF: 0 /LPF
KETONES UR QL STRIP: ABNORMAL
LEUKOCYTE ESTERASE UR QL STRIP: ABNORMAL
LIPASE SERPL-CCNC: 26 U/L
LYMPHOCYTES # BLD AUTO: 0.6 K/UL
LYMPHOCYTES NFR BLD: 4.4 %
MCH RBC QN AUTO: 30.1 PG
MCHC RBC AUTO-ENTMCNC: 34.4 G/DL
MCV RBC AUTO: 88 FL
MICROSCOPIC COMMENT: ABNORMAL
MONOCYTES # BLD AUTO: 0.6 K/UL
MONOCYTES NFR BLD: 3.9 %
NEUTROPHILS # BLD AUTO: 13 K/UL
NEUTROPHILS NFR BLD: 91.6 %
NITRITE UR QL STRIP: NEGATIVE
PH UR STRIP: 7 [PH] (ref 5–8)
PLATELET # BLD AUTO: 250 K/UL
PMV BLD AUTO: 10.3 FL
POTASSIUM SERPL-SCNC: 3.6 MMOL/L
PROT SERPL-MCNC: 7.5 G/DL
PROT UR QL STRIP: ABNORMAL
RBC # BLD AUTO: 4.09 M/UL
RBC #/AREA URNS HPF: 2 /HPF (ref 0–4)
SODIUM SERPL-SCNC: 137 MMOL/L
SP GR UR STRIP: 1.02 (ref 1–1.03)
SPECIMEN SOURCE: ABNORMAL
SQUAMOUS #/AREA URNS HPF: 4 /HPF
T VAGINALIS GENITAL QL WET PREP: ABNORMAL
URN SPEC COLLECT METH UR: ABNORMAL
UROBILINOGEN UR STRIP-ACNC: ABNORMAL EU/DL
WBC # BLD AUTO: 14.24 K/UL
WBC #/AREA URNS HPF: 20 /HPF (ref 0–5)
WBC #/AREA VAG WET PREP: ABNORMAL
YEAST GENITAL QL WET PREP: ABNORMAL

## 2018-10-09 PROCEDURE — 86592 SYPHILIS TEST NON-TREP QUAL: CPT

## 2018-10-09 PROCEDURE — 87086 URINE CULTURE/COLONY COUNT: CPT

## 2018-10-09 PROCEDURE — 87210 SMEAR WET MOUNT SALINE/INK: CPT

## 2018-10-09 PROCEDURE — 86593 SYPHILIS TEST NON-TREP QUANT: CPT

## 2018-10-09 PROCEDURE — 83690 ASSAY OF LIPASE: CPT

## 2018-10-09 PROCEDURE — 85025 COMPLETE CBC W/AUTO DIFF WBC: CPT

## 2018-10-09 PROCEDURE — 25000003 PHARM REV CODE 250: Performed by: PHYSICIAN ASSISTANT

## 2018-10-09 PROCEDURE — 86780 TREPONEMA PALLIDUM: CPT

## 2018-10-09 PROCEDURE — 96372 THER/PROPH/DIAG INJ SC/IM: CPT | Mod: 59

## 2018-10-09 PROCEDURE — 81025 URINE PREGNANCY TEST: CPT | Performed by: NURSE PRACTITIONER

## 2018-10-09 PROCEDURE — 81000 URINALYSIS NONAUTO W/SCOPE: CPT

## 2018-10-09 PROCEDURE — 96374 THER/PROPH/DIAG INJ IV PUSH: CPT

## 2018-10-09 PROCEDURE — 96361 HYDRATE IV INFUSION ADD-ON: CPT

## 2018-10-09 PROCEDURE — 87491 CHLMYD TRACH DNA AMP PROBE: CPT

## 2018-10-09 PROCEDURE — 80053 COMPREHEN METABOLIC PANEL: CPT

## 2018-10-09 PROCEDURE — 63600175 PHARM REV CODE 636 W HCPCS: Performed by: PHYSICIAN ASSISTANT

## 2018-10-09 PROCEDURE — 99284 EMERGENCY DEPT VISIT MOD MDM: CPT | Mod: 25

## 2018-10-09 RX ORDER — METRONIDAZOLE 500 MG/1
500 TABLET ORAL 2 TIMES DAILY
Qty: 14 TABLET | Refills: 0 | Status: SHIPPED | OUTPATIENT
Start: 2018-10-09 | End: 2018-10-16

## 2018-10-09 RX ORDER — CEPHALEXIN 500 MG/1
500 CAPSULE ORAL 2 TIMES DAILY
Qty: 14 CAPSULE | Refills: 0 | Status: SHIPPED | OUTPATIENT
Start: 2018-10-09 | End: 2018-10-16

## 2018-10-09 RX ORDER — CEFTRIAXONE 500 MG/1
250 INJECTION, POWDER, FOR SOLUTION INTRAMUSCULAR; INTRAVENOUS
Status: COMPLETED | OUTPATIENT
Start: 2018-10-09 | End: 2018-10-09

## 2018-10-09 RX ORDER — AZITHROMYCIN 250 MG/1
1000 TABLET, FILM COATED ORAL
Status: COMPLETED | OUTPATIENT
Start: 2018-10-09 | End: 2018-10-09

## 2018-10-09 RX ORDER — ONDANSETRON 4 MG/1
4 TABLET, ORALLY DISINTEGRATING ORAL
Status: COMPLETED | OUTPATIENT
Start: 2018-10-09 | End: 2018-10-09

## 2018-10-09 RX ORDER — KETOROLAC TROMETHAMINE 30 MG/ML
15 INJECTION, SOLUTION INTRAMUSCULAR; INTRAVENOUS
Status: COMPLETED | OUTPATIENT
Start: 2018-10-09 | End: 2018-10-09

## 2018-10-09 RX ORDER — ONDANSETRON 4 MG/1
4 TABLET, FILM COATED ORAL EVERY 6 HOURS PRN
Qty: 10 TABLET | Refills: 0 | Status: SHIPPED | OUTPATIENT
Start: 2018-10-09 | End: 2019-11-22 | Stop reason: CLARIF

## 2018-10-09 RX ORDER — ACETAMINOPHEN 500 MG
1000 TABLET ORAL
Status: COMPLETED | OUTPATIENT
Start: 2018-10-09 | End: 2018-10-09

## 2018-10-09 RX ADMIN — KETOROLAC TROMETHAMINE 15 MG: 30 INJECTION, SOLUTION INTRAMUSCULAR at 01:10

## 2018-10-09 RX ADMIN — ONDANSETRON 4 MG: 4 TABLET, ORALLY DISINTEGRATING ORAL at 01:10

## 2018-10-09 RX ADMIN — AZITHROMYCIN MONOHYDRATE 1000 MG: 250 TABLET ORAL at 02:10

## 2018-10-09 RX ADMIN — SODIUM CHLORIDE 1000 ML: 0.9 INJECTION, SOLUTION INTRAVENOUS at 01:10

## 2018-10-09 RX ADMIN — ACETAMINOPHEN 1000 MG: 500 TABLET, FILM COATED ORAL at 01:10

## 2018-10-09 RX ADMIN — CEFTRIAXONE SODIUM 250 MG: 500 INJECTION, POWDER, FOR SOLUTION INTRAMUSCULAR; INTRAVENOUS at 02:10

## 2018-10-09 NOTE — ED TRIAGE NOTES
Patient reports abdominal pain with N/V that started last night. Denies diarrhea. Last BM yesterday. Reports taking Ibuprofen 800mg last night and Tylenol this morning with no relief.

## 2018-10-09 NOTE — ED PROVIDER NOTES
Encounter Date: 10/9/2018     This is a 33 y.o. female complaining of lower abdominal pain with associated nausea and vomiting that began yesterday. Febrile at triage.    I have evaluated and conducted a medical screening exam with initial orders entered, if indicated, to expedite care. The patient will be placed in a treatment area when one is available. Care will be transferred to an alternate provider for a full assessment including but not limited to: history, physical exam, additional orders, and final disposition.    Maxi Duran NP      SCRIBE #1 NOTE: [place]awood am scribing for, and in the presence of,  Cindy Henry PA-C. I have scribed the following portions of the note - Other sections scribed: HPI, ROS.       History     Chief Complaint   Patient presents with    Abdominal Pain     States since last night she has had abd pain and n/v  Denies diarrhea    Nausea    Vomiting     CC: Abdominal Pain ; Nausea     HPI  The patient is a 32 y/o female smoker with past medical hx of anxiety, asthma, mental disorder, migraine, umbilical hernia, and x4 vaginal delivery that presents for emergent consideration of acute, severe (10/10) lower abdominal pain with nausea and vomiting that began last night. She reports x6-7 episodes of emesis since onset of her pain. Her last BM was x1-2 days ago. She is also c/o vaginal discharge that began last night. Pt otherwise denies diarrhea, constipation, dysuria, pressure, or new sexual partners. She attempted treatment with ibuprofen last night and then tylenol today PTA without relief of her pain.       The history is provided by the patient. No  was used.     Review of patient's allergies indicates:  No Known Allergies  Past Medical History:   Diagnosis Date    Anxiety     Asthma     as a child, no recent episodes    GERD (gastroesophageal reflux disease)     Mental disorder     Migraine headache     Smoker     Umbilical hernia      Vaginal delivery     x4     Past Surgical History:   Procedure Laterality Date    DILATION AND CURETTAGE OF UTERUS      HYSTEROSCOPY      [id number] Bilateral 3/20/2015    [facility]    REPAIR-HERNIA-UMBILICAL N/A 10/24/2016    [facility] at [facility] OR    TUBAL LIGATION      VAGINAL DELIVERY      x 4     Family History   Problem Relation Age of Onset    Cancer Neg Hx     Eclampsia Neg Hx      Social History     Tobacco Use    Smoking status: Current Every Day Smoker     Packs/day: 1.00     Years: 5.00     Pack years: 5.00    Smokeless tobacco: Never Used   Substance Use Topics    Alcohol use: Yes     Comment: socially    Drug use: No     Review of Systems   Constitutional: Negative for appetite change and fever.   HENT: Negative for congestion, rhinorrhea and sore throat.    Eyes: Negative for pain and visual disturbance.   Respiratory: Negative for cough and shortness of breath.    Cardiovascular: Negative for chest pain.   Gastrointestinal: Positive for abdominal pain, nausea and vomiting. Negative for blood in stool, constipation and diarrhea.   Genitourinary: Positive for vaginal discharge. Negative for difficulty urinating, dysuria, frequency and hematuria.   Musculoskeletal: Negative for gait problem and neck pain.   Skin: Negative for rash.   Neurological: Negative for dizziness, syncope, light-headedness, numbness and headaches.   Psychiatric/Behavioral: Negative for confusion.   All other systems reviewed and are negative.      Physical Exam     Initial Vitals [10/09/18 1214]   BP Pulse Resp Temp SpO2   133/76 108 16 (!) 100.9 °F (38.3 °C) 100 %      MAP       --         Physical Exam    Nursing note and vitals reviewed.  Constitutional: Vital signs are normal. She appears well-developed and well-nourished. She is not diaphoretic. She is cooperative.  Non-toxic appearance. She does not have a sickly appearance. She does not  appear ill. No distress.   HENT:   Head: Normocephalic and atraumatic.   Right Ear: External ear normal.   Left Ear: External ear normal.   Nose: Nose normal.   Mouth/Throat: Oropharynx is clear and moist. No oropharyngeal exudate.   Eyes: Conjunctivae, EOM and lids are normal. Pupils are equal, round, and reactive to light.   Neck: Trachea normal, normal range of motion, full passive range of motion without pain and phonation normal. Neck supple.   Cardiovascular: Normal rate, regular rhythm, normal heart sounds and intact distal pulses. Exam reveals no gallop and no friction rub.    No murmur heard.  Pulmonary/Chest: Effort normal and breath sounds normal. No respiratory distress. She has no decreased breath sounds. She has no wheezes. She has no rhonchi. She has no rales.   Abdominal: Soft. Normal appearance and bowel sounds are normal. She exhibits no distension and no mass. There is tenderness in the suprapubic area. There is no rigidity, no rebound, no guarding and no CVA tenderness.   Genitourinary: Pelvic exam was performed with patient supine. There is no rash, tenderness, lesion or injury on the right labia. There is no rash, tenderness, lesion or injury on the left labia. Cervix exhibits no motion tenderness, no discharge and no friability. Right adnexum displays no tenderness. Left adnexum displays no tenderness. No erythema, tenderness (moderate yellow milky) or bleeding in the vagina. No foreign body in the vagina. No signs of injury around the vagina. Vaginal discharge found.   Musculoskeletal: Normal range of motion.   Neurological: She is alert and oriented to person, place, and time. She has normal strength.   Skin: Skin is warm and dry. Capillary refill takes less than 2 seconds. No rash noted.   Psychiatric: She has a normal mood and affect. Her speech is normal and behavior is normal. Judgment and thought content normal. Cognition and memory are normal.         ED Course   Procedures  Labs  Reviewed   CBC W/ AUTO DIFFERENTIAL - Abnormal; Notable for the following components:       Result Value    WBC 14.24 (*)     Hematocrit 35.8 (*)     Gran # (ANC) 13.0 (*)     Lymph # 0.6 (*)     Gran% 91.6 (*)     Lymph% 4.4 (*)     Mono% 3.9 (*)     All other components within normal limits   COMPREHENSIVE METABOLIC PANEL - Abnormal; Notable for the following components:    CO2 22 (*)     Total Bilirubin 1.1 (*)     All other components within normal limits   URINALYSIS, REFLEX TO URINE CULTURE - Abnormal; Notable for the following components:    Protein, UA 1+ (*)     Ketones, UA Trace (*)     Urobilinogen, UA 2.0-3.0 (*)     Leukocytes, UA 3+ (*)     All other components within normal limits    Narrative:     Preferred Collection Type->Urine, Clean Catch   VAGINAL SCREEN - Abnormal; Notable for the following components:    Clue Cells, Wet Prep Rare (*)     WBC - Vaginal Screen Few (*)     Bacteria - Vaginal Screen Few (*)     All other components within normal limits   URINALYSIS MICROSCOPIC - Abnormal; Notable for the following components:    WBC, UA 20 (*)     All other components within normal limits    Narrative:     Preferred Collection Type->Urine, Clean Catch   C. TRACHOMATIS/N. GONORRHOEAE BY AMP DNA   CULTURE, URINE   LIPASE   RPR   POCT URINE PREGNANCY          Imaging Results    None                APC / Resident Notes:   This is an evaluation of a 33 y.o. female that presents to the Emergency Department for abdominal pain, nausea and emesis. Patient reports lower abdominal pain that radiates to the lower back with associated nausea and emesis that began last night. She reports using a douche, followed by onset of symptoms. She also reports noticing vaginal discharge yesterday. She denies a new sexual partner. She reports attempted tx with Tylenol and Ibuprofen last night.   After initial inquery, patient reported to Dr. Gonzalez that she had an unprotected sexual encounter with an individual who the  health department contacted her regarding possible syphilis exposure.    Exam findings: Patient is non-toxic, febrile and well appearing.  The abdomen is soft and nondistended.  There is generalized tenderness of the suprapubic region.  No peritoneal signs. Bowel sounds are appreciated.  exam witnessed by Mavis Belle RN. The external genitalia appears normal. There is moderate yellow discharge. No bleeding or foreign body.  The cervical os is closed.  There is no cervical motion tenderness, cervical discharge or cervical friability.  There is no adnexal tenderness.    If available, past records have been reviewed.  Vitals are reassuring.  Results:   UPT negative  UA revealing for UTI.  Vaginal screen revealing for clue cells.  GC culture pending.  CBC shows leukocytosis (WBC 14.21)  CMP and lipase unrevealing.  RPR pending.    My overall impression: cervicitis, suprapubic pain, bacterial vaginosis, UTI, nausea  DDx: cervicitis, BV, candidiasis, trichomonas, STI, UTI, abdominal pain, pyelonephritis, other    ED course: IV fluid rehydration with 1 L, Zofran, Tylenol, Motrin. I will treat cervicitis with Ceftriaxone and Azithromycin. I will prescribed Flagyl and Keflex and recommend close follow-up with OBGYN and PCP. I will recommend that patient continue Tylenol and/or Motrin for fever control. I have informed patient that we will contact her if RPR is positive. Strict ED return precautions given.    The diagnosis and treatment plan have been discussed with the patient. All questions and concerns have been addressed. Patient expressed understanding. An educational information sheet was given to the patient prior to discharge.     This case has been discussed with Dr. Gonzalez and she is in agreement of the diagnosis and treatment plan.       Cindy Godinez PA-C         Scribe Attestation:   Scribe #1: I performed the above scribed service and the documentation accurately describes the services I performed. I  attest to the accuracy of the note.    Attending Attestation:           Physician Attestation for Scribe:  Physician Attestation Statement for Scribe #1: I, Cindy Henry PA-C, reviewed documentation, as scribed by Dawood Nunez in my presence, and it is both accurate and complete.                    Clinical Impression:   The primary encounter diagnosis was Bacterial vaginosis. Diagnoses of Cervicitis, Suprapubic abdominal pain, Acute cystitis with hematuria, and Nausea were also pertinent to this visit.      Disposition:   Disposition: Discharged  Condition: Stable                        Cindy Henry PA-C  10/09/18 1600

## 2018-10-09 NOTE — DISCHARGE INSTRUCTIONS
Please take the antibiotics as prescribed.    Please follow-up with OBGYN if your symptoms continue.    Return to the emergency department for any concerns.

## 2018-10-10 ENCOUNTER — PES CALL (OUTPATIENT)
Dept: ADMINISTRATIVE | Facility: CLINIC | Age: 33
End: 2018-10-10

## 2018-10-10 LAB
C TRACH DNA SPEC QL NAA+PROBE: NOT DETECTED
N GONORRHOEA DNA SPEC QL NAA+PROBE: DETECTED

## 2018-10-11 LAB
BACTERIA UR CULT: NORMAL
RPR SER QL: REACTIVE
RPR SER-TITR: ABNORMAL {TITER}

## 2018-10-15 ENCOUNTER — HOSPITAL ENCOUNTER (EMERGENCY)
Facility: HOSPITAL | Age: 33
Discharge: HOME | End: 2018-10-15
Attending: EMERGENCY MEDICINE
Payer: MEDICAID

## 2018-10-15 VITALS
SYSTOLIC BLOOD PRESSURE: 118 MMHG | OXYGEN SATURATION: 99 % | RESPIRATION RATE: 20 BRPM | BODY MASS INDEX: 22.2 KG/M2 | HEIGHT: 64 IN | TEMPERATURE: 99 F | DIASTOLIC BLOOD PRESSURE: 77 MMHG | WEIGHT: 130 LBS | HEART RATE: 77 BPM

## 2018-10-15 DIAGNOSIS — R10.2 PELVIC PAIN: ICD-10-CM

## 2018-10-15 DIAGNOSIS — Z20.2 STD EXPOSURE: Primary | ICD-10-CM

## 2018-10-15 LAB
B-HCG UR QL: NEGATIVE
CTP QC/QA: YES
T PALLIDUM AB SER QL IF: REACTIVE

## 2018-10-15 PROCEDURE — 63600175 PHARM REV CODE 636 W HCPCS: Performed by: PHYSICIAN ASSISTANT

## 2018-10-15 PROCEDURE — 96372 THER/PROPH/DIAG INJ SC/IM: CPT

## 2018-10-15 PROCEDURE — 25000003 PHARM REV CODE 250: Performed by: PHYSICIAN ASSISTANT

## 2018-10-15 PROCEDURE — 99284 EMERGENCY DEPT VISIT MOD MDM: CPT | Mod: 25

## 2018-10-15 PROCEDURE — 81025 URINE PREGNANCY TEST: CPT | Performed by: PHYSICIAN ASSISTANT

## 2018-10-15 RX ORDER — AZITHROMYCIN 250 MG/1
1000 TABLET, FILM COATED ORAL
Status: COMPLETED | OUTPATIENT
Start: 2018-10-15 | End: 2018-10-15

## 2018-10-15 RX ORDER — IBUPROFEN 600 MG/1
600 TABLET ORAL EVERY 6 HOURS PRN
Qty: 20 TABLET | Refills: 0 | Status: SHIPPED | OUTPATIENT
Start: 2018-10-15 | End: 2019-11-22 | Stop reason: CLARIF

## 2018-10-15 RX ORDER — CEFTRIAXONE 250 MG/1
250 INJECTION, POWDER, FOR SOLUTION INTRAMUSCULAR; INTRAVENOUS
Status: COMPLETED | OUTPATIENT
Start: 2018-10-15 | End: 2018-10-15

## 2018-10-15 RX ADMIN — AZITHROMYCIN MONOHYDRATE 1000 MG: 250 TABLET ORAL at 09:10

## 2018-10-15 RX ADMIN — CEFTRIAXONE SODIUM 250 MG: 250 INJECTION, POWDER, FOR SOLUTION INTRAMUSCULAR; INTRAVENOUS at 09:10

## 2018-10-16 NOTE — ED TRIAGE NOTES
Patient arrived to ED with c/o RLQ abd pain x 6 days.  Reports that she was treated chlamydia when she was seen on the 8th here in the ED and went home.  Reports that she was notified by [facility] that her test was positive but had already had sex with the same partner.

## 2018-10-16 NOTE — ED PROVIDER NOTES
Encounter Date: 10/15/2018    SORT:  33 y.o. female presenting to ED for lower abd pain and concern for STD. Treated for STD in this ED 6 days ago. Limited triage exam:  Non-toxic. If orders were placed, they are pending.     Jaylen Couch PA-C  10/15/2018  8:35 PM   SCRIBE #1 NOTE: I, Jv Chatters, am scribing for, and in the presence of,  Don Hartmann PA-C. I have scribed the following portions of the note - Other sections scribed: HPI and ROS.       History     Chief Complaint   Patient presents with    Exposure to STD     Pt states she was called by RN at this hospital after being dx with chlamydia. Pt states she had sex with the same person and wants to get checked again. c/o lower abd pain     CC: Exposure to STD    HPI: This 33 y.o F smoker with Anxiety, Asthma, GERD, Mental disorder and Umbilical hernia presents to the ED c/o exposure to STD. The pt was seen in this ED 10/9 for lower abdominal pain and was treated for chlamydia and gonorrhea. She admits to having unprotected sex with her boyfriend inbetween her previous visit and today. Her boyfriend has not yet been treated. She is complaint with her Flagyl 500mg and Keflex 500mg. She states that her pain has improved slightly, but she is still experiencing moderate (5/10) right side suprapubic pain x1 week. She denies abnormal vaginal discharge, vaginal bleeding, fever, rash, joint pain, joint swelling, nausea, emesis, diarrhea, back pain, dysuria, difficulty urinating, urgency, chest pain, SOB and rash.      The history is provided by the patient. No  was used.     Review of patient's allergies indicates:  No Known Allergies  Past Medical History:   Diagnosis Date    Anxiety     Asthma     as a child, no recent episodes    GERD (gastroesophageal reflux disease)     Mental disorder     Migraine headache     Smoker     Umbilical hernia     Vaginal delivery     x4     Past Surgical History:   Procedure Laterality Date     DILATION AND CURETTAGE OF UTERUS      HYSTEROSCOPY      [id number] Bilateral 3/20/2015    [facility]    REPAIR-HERNIA-UMBILICAL N/A 10/24/2016    [facility] at [facility]    TUBAL LIGATION      VAGINAL DELIVERY      x 4     Family History   Problem Relation Age of Onset    Cancer Neg Hx     Eclampsia Neg Hx      Social History     Tobacco Use    Smoking status: Current Every Day Smoker     Packs/day: 1.00     Years: 5.00     Pack years: 5.00    Smokeless tobacco: Never Used   Substance Use Topics    Alcohol use: Yes     Comment: socially    Drug use: No     Review of Systems   Constitutional: Negative for chills, diaphoresis and fever.   HENT: Negative for rhinorrhea and sore throat.    Eyes: Negative for redness.   Respiratory: Negative for cough and shortness of breath.    Cardiovascular: Negative for chest pain.   Gastrointestinal: Positive for abdominal pain (right suprapubic). Negative for diarrhea, nausea and vomiting.   Genitourinary: Negative for dysuria, frequency and urgency.   Musculoskeletal: Negative for back pain and neck pain.   Skin: Negative for rash.   Psychiatric/Behavioral: The patient is not nervous/anxious.        Physical Exam     Initial Vitals [10/15/18 2034]   BP Pulse Resp Temp SpO2   (!) 138/91 90 17 98.9 °F (37.2 °C) 100 %      MAP       --         Physical Exam    ED Course   Procedures  Labs Reviewed   POCT URINE PREGNANCY          Imaging Results    None          Medical Decision Making:   ED Management:  Patient with recently treated gonorrhea head unprotected intercourse with partner who was not treated.  Patient tested positive for syphilis 6 days ago.  Will treat for gonorrhea, chlamydia and syphilis with Rocephin and 2 g of azithromycin.  She has no chancre or rash.  She does have mild right pelvic pain, that is minimally tender with no peritoneal signs.  She is well-appearing, afebrile, with vital  signs within normal limits. I do not suspect PID, TOA, appendicitis, ovarian torsion, or pyelonephritis (urine culture 6 days ago negative).  Patient given instructions for safe sex practices and STD prevention, as well as follow-up for re-evaluation.  She verbalized understanding and agreed with plan.  Case discussed with Dr. Eric Cazares Attestation:   Scribe #1: I performed the above scribed service and the documentation accurately describes the services I performed. I attest to the accuracy of the note.    Attending Attestation:           Physician Attestation for Scribe:  Physician Attestation Statement for Scribe #1: I, Don Hartmann PA-C, reviewed documentation, as scribed by Jv Chatters in my presence, and it is both accurate and complete.                    Clinical Impression:   The primary encounter diagnosis was STD exposure. A diagnosis of Pelvic pain was also pertinent to this visit.                             Don Hartmann PA-C  10/15/18 1101

## 2018-10-16 NOTE — DISCHARGE INSTRUCTIONS
Practice safe sex habits.  No intercourse for 2 weeks.  Follow up with primary care physician for re-evaluation.

## 2018-10-30 ENCOUNTER — OFFICE VISIT (OUTPATIENT)
Dept: OBSTETRICS AND GYNECOLOGY | Facility: CLINIC | Age: 33
End: 2018-10-30
Payer: MEDICAID

## 2018-10-30 VITALS
SYSTOLIC BLOOD PRESSURE: 122 MMHG | WEIGHT: 139.56 LBS | HEIGHT: 64 IN | BODY MASS INDEX: 23.82 KG/M2 | DIASTOLIC BLOOD PRESSURE: 62 MMHG | TEMPERATURE: 99 F

## 2018-10-30 DIAGNOSIS — A54.9 GONORRHEA IN FEMALE: Primary | ICD-10-CM

## 2018-10-30 DIAGNOSIS — A53.0 SYPHILIS, LATENT: ICD-10-CM

## 2018-10-30 PROCEDURE — 99999 PR PBB SHADOW E&M-EST. PATIENT-LVL III: CPT | Mod: PBBFAC,,, | Performed by: OBSTETRICS & GYNECOLOGY

## 2018-10-30 PROCEDURE — 99213 OFFICE O/P EST LOW 20 MIN: CPT | Mod: S$PBB,,, | Performed by: OBSTETRICS & GYNECOLOGY

## 2018-10-30 PROCEDURE — 99213 OFFICE O/P EST LOW 20 MIN: CPT | Mod: PBBFAC | Performed by: OBSTETRICS & GYNECOLOGY

## 2018-10-30 NOTE — PROGRESS NOTES
Subjective:       Patient ID: Karen Mccall is a 33 y.o. female.    Chief Complaint:  Follow-up (Follow up ER on 10/15/18 for exposure.  Pt has been treated.)      History of Present Illness  HPI  Patient comes in today for follow-up  Seen in the ED on 10/9/2018  Diagnosed with gonorrhea.  Treated on 10/15/2018.  Negative for chlamydia.  However, her RPR was positive at 1:1; her FTA-Abs also positive.  She was told to go see her PCP.  Prescription for PGN G given.      GYN & OB History  Patient's last menstrual period was 10/01/2018 (exact date).   Date of Last Pap: 10/12/2016    OB History    Para Term  AB Living   5 4 4   1 4   SAB TAB Ectopic Multiple Live Births   1     0 4      # Outcome Date GA Lbr Kemar/2nd Weight Sex Delivery Anes PTL Lv   5 Term 14 39w2d  2.432 kg (5 lb 5.8 oz) F Vag-Spont EPI N MAYDA   4 Term 10/03/12 39w0d  3.164 kg (6 lb 15.6 oz) M INDUCTION EPI N MAYDA   3 Term 05   3.317 kg (7 lb 5 oz) M Vag-Spont EPI N MAYDA   2 Term 03   3.317 kg (7 lb 5 oz) M Vag-Spont EPI N MAYDA   1 SAB 02                Past Medical History:   Diagnosis Date    Anxiety     Asthma     as a child, no recent episodes    GERD (gastroesophageal reflux disease)     Mental disorder     Migraine headache     Smoker     Umbilical hernia     Vaginal delivery     x4       Past Surgical History:   Procedure Laterality Date    DILATION AND CURETTAGE OF UTERUS      HYSTEROSCOPY      [id number] Bilateral 3/20/2015    [facility] at [facility]    REPAIR-HERNIA-UMBILICAL N/A 10/24/2016    Performed by [facility]olson [facility] at [facility] OR    TUBAL LIGATION      VAGINAL DELIVERY      x 4       Family History   Problem Relation Age of Onset    Cancer Neg Hx     Eclampsia Neg Hx        Social History     Socioeconomic History    Marital status: Single     Spouse name: None    Number of children: None    Years of education: None    Highest education level:  None   Social Needs    Financial resource strain: None    Food insecurity - worry: None    Food insecurity - inability: None    Transportation needs - medical: None    Transportation needs - non-medical: None   Occupational History    None   Tobacco Use    Smoking status: Current Every Day Smoker     Packs/day: 1.00     Years: 5.00     Pack years: 5.00    Smokeless tobacco: Never Used   Substance and Sexual Activity    Alcohol use: Yes     Comment: socially    Drug use: No    Sexual activity: Yes     Partners: Male     Birth control/protection: None   Other Topics Concern    None   Social History Narrative    Together since 2011    She is a homemaker.           Current Outpatient Medications   Medication Sig Dispense Refill    butalbital-acetaminophen-caffeine -40 mg (FIORICET, ESGIC) -40 mg per tablet Take 1 tablet by mouth once daily. 30 tablet 0    clonazePAM (KLONOPIN) 0.5 MG tablet 1 tablet PO BID prn anxiety 60 tablet 0    cyclobenzaprine (FLEXERIL) 5 MG tablet Take 1 tablet (5 mg total) by mouth nightly as needed. 30 tablet 1    cyproheptadine (PERIACTIN) 4 mg tablet Take 1 tablet (4 mg total) by mouth 3 (three) times daily as needed. 60 tablet 2    fluticasone (FLONASE) 50 mcg/actuation nasal spray 1-2 sprays by Each Nare route once daily. 1 Bottle 0    gabapentin (NEURONTIN) 300 MG capsule Take 1 capsule (300 mg total) by mouth 2 (two) times daily. 60 capsule 5    hydrOXYzine pamoate (VISTARIL) 25 MG Cap Take 1 capsule (25 mg total) by mouth every 8 (eight) hours as needed. 90 capsule 1    ibuprofen (ADVIL,MOTRIN) 600 MG tablet Take 1 tablet (600 mg total) by mouth every 6 (six) hours as needed for Pain. 20 tablet 0    meclizine (ANTIVERT) 25 mg tablet Take 1 tablet (25 mg total) by mouth 3 (three) times daily as needed. 30 tablet 2    naproxen (NAPROSYN) 500 MG tablet Take 1 tablet (500 mg total) by mouth 2 (two) times daily with meals. 30 tablet 1    omeprazole  (PRILOSEC) 40 MG capsule Take 1 capsule (40 mg total) by mouth once daily. 30 capsule 11    ondansetron (ZOFRAN) 4 MG tablet Take 1 tablet (4 mg total) by mouth every 6 (six) hours as needed for Nausea. 10 tablet 0    paroxetine (PAXIL) 20 MG tablet Take 1 tablet (20 mg total) by mouth every morning. 30 tablet 5    topiramate (TOPAMAX) 25 MG tablet Take 1 tablet (25 mg total) by mouth 2 (two) times daily. 60 tablet 3    topiramate (TOPAMAX) 25 MG tablet TAKE ONE Tablet BY MOUTH TWICE DAILY 60 tablet 3    traMADol (ULTRAM) 50 mg tablet TAKE ONE TABLET BY MOUTH EVERY TWELVE HOURS AS NEEDED 30 tablet 0    amoxicillin (AMOXIL) 875 MG tablet Take 1 tablet (875 mg total) by mouth every 12 (twelve) hours. 20 tablet 0     No current facility-administered medications for this visit.        Review of patient's allergies indicates:  No Known Allergies      Review of Systems  Review of Systems   Constitutional: Negative for activity change, appetite change, chills, fatigue, fever and unexpected weight change.   HENT: Negative for mouth sores.    Respiratory: Negative for cough, shortness of breath and wheezing.    Cardiovascular: Negative for chest pain and palpitations.   Gastrointestinal: Negative for abdominal pain, bloating, blood in stool, constipation, nausea and vomiting.   Endocrine: Negative for diabetes and hot flashes.   Genitourinary: Negative for dyspareunia, dysuria, frequency, hematuria, menorrhagia, menstrual problem, pelvic pain, urgency, vaginal bleeding, vaginal discharge, vaginal pain, dysmenorrhea, urinary incontinence, postcoital bleeding and vaginal odor.   Musculoskeletal: Negative for back pain and myalgias.   Skin:  Negative for rash.   Neurological: Negative for seizures and headaches.   Psychiatric/Behavioral: Negative for depression and sleep disturbance. The patient is not nervous/anxious.    Breast: Negative for breast mass, breast pain and nipple discharge          Objective:    Physical  Exam:   Constitutional: She appears well-developed and well-nourished. No distress.    HENT:   Head: Normocephalic and atraumatic.    Eyes: EOM are normal.    Neck: Normal range of motion.    Cardiovascular: Normal rate.     Pulmonary/Chest: Effort normal. No respiratory distress.                  Musculoskeletal: Normal range of motion.       Neurological: She is alert.    Skin: Skin is warm and dry.    Psychiatric: She has a normal mood and affect.          Assessment:     1.  Gonorrhea cervicitis  2.  Latent syphilis        Plan:      I have extensively discussed with the patient regarding her condition  Test results discussed  She has been treated for gonorrhea in the ED  Prescription for PGN G per PCP  Review of her chart reveals negative RPR in 2014.  No testing since.  In ED, her RPR and FTA-ABS positive.  Low titer at 1:1.  This is more likely to be latent syphilis since duration is unknown, possibly over one year.    Patient would need penicillin G 2.4 millions unit IM Qwk x 3.  She will be sent to pharmacy to  her antibiotics and come back for injection.

## 2018-11-07 ENCOUNTER — CLINICAL SUPPORT (OUTPATIENT)
Dept: OBSTETRICS AND GYNECOLOGY | Facility: CLINIC | Age: 33
End: 2018-11-07
Payer: MEDICAID

## 2018-11-07 ENCOUNTER — TELEPHONE (OUTPATIENT)
Dept: OBSTETRICS AND GYNECOLOGY | Facility: CLINIC | Age: 33
End: 2018-11-07

## 2018-11-07 VITALS
BODY MASS INDEX: 23.84 KG/M2 | SYSTOLIC BLOOD PRESSURE: 124 MMHG | HEART RATE: 70 BPM | WEIGHT: 138.88 LBS | DIASTOLIC BLOOD PRESSURE: 70 MMHG

## 2018-11-07 DIAGNOSIS — Z20.2 EXPOSURE TO SEXUALLY TRANSMITTED DISEASE (STD): Primary | ICD-10-CM

## 2018-11-07 PROCEDURE — 99999 PR PBB SHADOW E&M-EST. PATIENT-LVL III: CPT | Mod: PBBFAC,,,

## 2018-11-07 PROCEDURE — 99213 OFFICE O/P EST LOW 20 MIN: CPT | Mod: PBBFAC

## 2018-11-07 PROCEDURE — 96372 THER/PROPH/DIAG INJ SC/IM: CPT | Mod: PBBFAC

## 2018-11-07 NOTE — TELEPHONE ENCOUNTER
Pt wants to know if her partner needs to be treated.  Pt was advised that he definitely needs to be treated if there were no blood work done on him yet.  He would need to go see his PCP or the health unit for treatment. Pt would also need Dr. Mckeon to send the last Rx for treatment to [facility] so she can  next week for her last dose on 11/14/18. Pt was told that Dr. Mckeon will be informed. Pt voiced understanding. zack    ----- Message from Rell Malhotra sent at 11/7/2018  2:30 PM CST -----  Contact: Self/[phone]  Patient has questions regarding her appointment on today. Please call ASAP. Thank you.

## 2018-11-07 NOTE — PROGRESS NOTES
sexually transmitted diseases discussed with pt, informed her of the s/s , discussed the medication   and how it works, informed her of the importance of keeping her follow up visit. Informed her that her partner needs to be tested and treated as well. STD handout given to pt to take with her, Bicillin  L-A 1,200,000 injection  X 2 syringes to = 24,000,000 units total , given via 1,200,000 units to r gluteal & 1,200,000 units to l gluteal w/o incident , instructed pt on s/s of adverse reactions to medication

## 2018-11-13 ENCOUNTER — TELEPHONE (OUTPATIENT)
Dept: OBSTETRICS AND GYNECOLOGY | Facility: CLINIC | Age: 33
End: 2018-11-13

## 2018-11-13 NOTE — TELEPHONE ENCOUNTER
----- Message from Rell Malhotra sent at 11/13/2018  1:55 PM CST -----  Contact: Self/[phone]  Patient stated that she has questions regarding her injection. Thank you.      Patient is aware that the inj site will still be sore for a few days

## 2018-11-14 ENCOUNTER — CLINICAL SUPPORT (OUTPATIENT)
Dept: OBSTETRICS AND GYNECOLOGY | Facility: CLINIC | Age: 33
End: 2018-11-14
Payer: MEDICAID

## 2018-11-14 VITALS
HEART RATE: 60 BPM | BODY MASS INDEX: 23.92 KG/M2 | WEIGHT: 139.31 LBS | DIASTOLIC BLOOD PRESSURE: 72 MMHG | SYSTOLIC BLOOD PRESSURE: 128 MMHG

## 2018-11-14 DIAGNOSIS — Z20.2 EXPOSURE TO SEXUALLY TRANSMITTED DISEASE (STD): Primary | ICD-10-CM

## 2018-11-14 DIAGNOSIS — A53.9 SYPHILIS: Primary | ICD-10-CM

## 2018-11-14 PROCEDURE — 96372 THER/PROPH/DIAG INJ SC/IM: CPT | Mod: PBBFAC

## 2018-11-14 PROCEDURE — 99999 PR PBB SHADOW E&M-EST. PATIENT-LVL III: CPT | Mod: PBBFAC,,,

## 2018-11-14 PROCEDURE — 99213 OFFICE O/P EST LOW 20 MIN: CPT | Mod: PBBFAC

## 2018-11-14 NOTE — PROGRESS NOTES
bicilllin injection 1,200,000 units per 2ml x2 injections given, 1 injection given via r gluteal and 1 injection given via l gluteal w/o incident

## 2018-11-26 ENCOUNTER — CLINICAL SUPPORT (OUTPATIENT)
Dept: OBSTETRICS AND GYNECOLOGY | Facility: CLINIC | Age: 33
End: 2018-11-26
Payer: MEDICAID

## 2018-11-26 VITALS
DIASTOLIC BLOOD PRESSURE: 78 MMHG | BODY MASS INDEX: 23.29 KG/M2 | WEIGHT: 136.44 LBS | SYSTOLIC BLOOD PRESSURE: 118 MMHG | HEIGHT: 64 IN

## 2018-11-26 DIAGNOSIS — A53.0 SYPHILIS, LATENT: ICD-10-CM

## 2018-11-26 DIAGNOSIS — A53.0 SYPHILIS, LATENT: Primary | ICD-10-CM

## 2018-11-26 DIAGNOSIS — A53.0 SYPHILI, LATENT: Primary | ICD-10-CM

## 2018-11-26 PROBLEM — A53.9 SYPHILIS: Status: ACTIVE | Noted: 2018-11-26

## 2018-11-26 PROCEDURE — 99999 PR PBB SHADOW E&M-EST. PATIENT-LVL II: CPT | Mod: PBBFAC,,,

## 2018-11-26 PROCEDURE — 99212 OFFICE O/P EST SF 10 MIN: CPT | Mod: PBBFAC

## 2018-11-26 PROCEDURE — 96372 THER/PROPH/DIAG INJ SC/IM: CPT | Mod: PBBFAC

## 2018-11-26 NOTE — PROGRESS NOTES
Patient missed another injection last week.  Called patient this week.  She will come in today for Bicillin injection #3  Prescription sent to [facility] in [place].

## 2018-11-30 ENCOUNTER — TELEPHONE (OUTPATIENT)
Dept: OBSTETRICS AND GYNECOLOGY | Facility: CLINIC | Age: 33
End: 2018-11-30

## 2018-11-30 NOTE — TELEPHONE ENCOUNTER
New appt given to pt since appt was scheduled too far out. Pt will be notified if lab work is needed before appt. jtn    ----- Message from Melania Garner sent at 11/30/2018  2:27 PM CST -----  Contact: Self   Patient would like to know if she need to follow up before the date she is scheduled in February. Please call at [phone].

## 2019-01-02 ENCOUNTER — TELEPHONE (OUTPATIENT)
Dept: OBSTETRICS AND GYNECOLOGY | Facility: CLINIC | Age: 34
End: 2019-01-02

## 2019-01-02 NOTE — TELEPHONE ENCOUNTER
Pt requested a sooner appt and apologized that she had to change phone number and forgot to inform office. zack

## 2019-01-02 NOTE — TELEPHONE ENCOUNTER
----- Message from Raji Solano sent at 1/2/2019  2:25 PM CST -----  Contact: Self/[phone]  The patient would like to speak to Omaira Mckeon nurse. She didn't give any details.      Thank you

## 2019-01-02 NOTE — TELEPHONE ENCOUNTER
Spoke with pt. Pt requesting to only speak with Omaira. Pt informed Omaira would not be calling her until after 5. Pt voiced understanding.

## 2019-01-03 ENCOUNTER — OFFICE VISIT (OUTPATIENT)
Dept: OBSTETRICS AND GYNECOLOGY | Facility: CLINIC | Age: 34
End: 2019-01-03
Payer: MEDICAID

## 2019-01-03 VITALS
SYSTOLIC BLOOD PRESSURE: 104 MMHG | TEMPERATURE: 99 F | WEIGHT: 127.44 LBS | HEIGHT: 64 IN | BODY MASS INDEX: 21.76 KG/M2 | DIASTOLIC BLOOD PRESSURE: 64 MMHG

## 2019-01-03 DIAGNOSIS — N76.0 BACTERIAL VAGINITIS: ICD-10-CM

## 2019-01-03 DIAGNOSIS — A53.0 SYPHILIS, LATENT: Primary | ICD-10-CM

## 2019-01-03 DIAGNOSIS — A54.9 GONORRHEA IN FEMALE: ICD-10-CM

## 2019-01-03 DIAGNOSIS — B96.89 BACTERIAL VAGINITIS: ICD-10-CM

## 2019-01-03 PROCEDURE — 99213 PR OFFICE/OUTPT VISIT, EST, LEVL III, 20-29 MIN: ICD-10-PCS | Mod: S$PBB,,, | Performed by: OBSTETRICS & GYNECOLOGY

## 2019-01-03 PROCEDURE — 99213 OFFICE O/P EST LOW 20 MIN: CPT | Mod: S$PBB,,, | Performed by: OBSTETRICS & GYNECOLOGY

## 2019-01-03 PROCEDURE — 99213 OFFICE O/P EST LOW 20 MIN: CPT | Mod: PBBFAC | Performed by: OBSTETRICS & GYNECOLOGY

## 2019-01-03 PROCEDURE — 87491 CHLMYD TRACH DNA AMP PROBE: CPT

## 2019-01-03 PROCEDURE — 87510 GARDNER VAG DNA DIR PROBE: CPT

## 2019-01-03 PROCEDURE — 99999 PR PBB SHADOW E&M-EST. PATIENT-LVL III: ICD-10-PCS | Mod: PBBFAC,,, | Performed by: OBSTETRICS & GYNECOLOGY

## 2019-01-03 PROCEDURE — 87480 CANDIDA DNA DIR PROBE: CPT

## 2019-01-03 PROCEDURE — 99999 PR PBB SHADOW E&M-EST. PATIENT-LVL III: CPT | Mod: PBBFAC,,, | Performed by: OBSTETRICS & GYNECOLOGY

## 2019-01-03 NOTE — PROGRESS NOTES
Subjective:       Patient ID: Karen Mccall is a 33 y.o. female.    Chief Complaint:  Test Of Cure      History of Present Illness  HPI  Patient comes in today for follow-up  Requesting tests of cure.  History of gonorrhea and bacterial vaginosis  Positive for syphilis, status post PGN weekly x 3    No complaint today.  Back to long-term partner.       GYN & OB History  Patient's last menstrual period was 2018 (within days).   Date of Last Pap: 10/12/2016    OB History    Para Term  AB Living   5 4 4   1 4   SAB TAB Ectopic Multiple Live Births   1     0 4      # Outcome Date GA Lbr Kemar/2nd Weight Sex Delivery Anes PTL Lv   5 Term 14 39w2d  2.432 kg (5 lb 5.8 oz) F Vag-Spont EPI N MAYDA   4 Term 10/03/12 39w0d  3.164 kg (6 lb 15.6 oz) M INDUCTION EPI N MAYDA   3 Term 05   3.317 kg (7 lb 5 oz) M Vag-Spont EPI N MAYDA   2 Term 03   3.317 kg (7 lb 5 oz) M Vag-Spont EPI N MAYDA   1 SAB 02                Past Medical History:   Diagnosis Date    Anxiety     Asthma     as a child, no recent episodes    GERD (gastroesophageal reflux disease)     Mental disorder     Migraine headache     Smoker     Umbilical hernia     Vaginal delivery     x4       Past Surgical History:   Procedure Laterality Date    DILATION AND CURETTAGE OF UTERUS      HYSTEROSCOPY      [id number] Bilateral 3/20/2015    [facility]formed [facility]    REPAIR-HERNIA-UMBILICAL N/A 10/24/2016    Performed by [facility]olson [facility] at [facility] OR    TUBAL LIGATION      VAGINAL DELIVERY      x 4       Family History   Problem Relation Age of Onset    Cancer Neg Hx     Eclampsia Neg Hx        Social History     Socioeconomic History    Marital status: Single     Spouse name: None    Number of children: None    Years of education: None    Highest education level: None   Social Needs    Financial resource strain: None    Food insecurity - worry: None    Food insecurity -  inability: None    Transportation needs - medical: None    Transportation needs - non-medical: None   Occupational History    None   Tobacco Use    Smoking status: Current Every Day Smoker     Packs/day: 1.00     Years: 5.00     Pack years: 5.00    Smokeless tobacco: Never Used   Substance and Sexual Activity    Alcohol use: Yes     Comment: socially    Drug use: No    Sexual activity: Yes     Partners: Male     Birth control/protection: None   Other Topics Concern    None   Social History Narrative    Together since 2011    She is a homemaker.           Current Outpatient Medications   Medication Sig Dispense Refill    butalbital-acetaminophen-caffeine -40 mg (FIORICET, ESGIC) -40 mg per tablet TAKE ONE TABLET BY MOUTH once DAILY 30 tablet 0    clonazePAM (KLONOPIN) 0.5 MG tablet 1 tablet PO BID prn anxiety 60 tablet 0    cyclobenzaprine (FLEXERIL) 5 MG tablet Take 1 tablet (5 mg total) by mouth nightly as needed. 30 tablet 1    cyproheptadine (PERIACTIN) 4 mg tablet Take 1 tablet (4 mg total) by mouth 3 (three) times daily as needed. 60 tablet 2    fluticasone (FLONASE) 50 mcg/actuation nasal spray 1-2 sprays by Each Nare route once daily. 1 Bottle 0    gabapentin (NEURONTIN) 300 MG capsule Take 1 capsule (300 mg total) by mouth 2 (two) times daily. 60 capsule 5    hydrOXYzine pamoate (VISTARIL) 25 MG Cap Take 1 capsule (25 mg total) by mouth every 8 (eight) hours as needed. 90 capsule 1    ibuprofen (ADVIL,MOTRIN) 600 MG tablet Take 1 tablet (600 mg total) by mouth every 6 (six) hours as needed for Pain. 20 tablet 0    meclizine (ANTIVERT) 25 mg tablet Take 1 tablet (25 mg total) by mouth 3 (three) times daily as needed. 30 tablet 2    naproxen (NAPROSYN) 500 MG tablet Take 1 tablet (500 mg total) by mouth 2 (two) times daily with meals. 30 tablet 1    omeprazole (PRILOSEC) 40 MG capsule Take 1 capsule (40 mg total) by mouth once daily. 30 capsule 11    ondansetron (ZOFRAN) 4 MG  tablet Take 1 tablet (4 mg total) by mouth every 6 (six) hours as needed for Nausea. 10 tablet 0    paroxetine (PAXIL) 20 MG tablet Take 1 tablet (20 mg total) by mouth every morning. 30 tablet 5    topiramate (TOPAMAX) 25 MG tablet TAKE ONE TABLET BY MOUTH TWICE DAILY 60 tablet 3    traMADol (ULTRAM) 50 mg tablet TAKE ONE TABLET BY MOUTH EVERY TWELVE HOURS AS NEEDED 30 tablet 0     No current facility-administered medications for this visit.        Review of patient's allergies indicates:  No Known Allergies    Review of Systems  Review of Systems   Constitutional: Negative for activity change, appetite change, chills, fatigue, fever and unexpected weight change.   HENT: Negative for mouth sores.    Respiratory: Negative for cough, shortness of breath and wheezing.    Cardiovascular: Negative for chest pain and palpitations.   Gastrointestinal: Negative for abdominal pain, bloating, blood in stool, constipation, nausea and vomiting.   Endocrine: Negative for diabetes and hot flashes.   Genitourinary: Negative for dyspareunia, dysuria, frequency, hematuria, menorrhagia, menstrual problem, pelvic pain, urgency, vaginal bleeding, vaginal discharge, vaginal pain, dysmenorrhea, urinary incontinence, postcoital bleeding and vaginal odor.   Musculoskeletal: Negative for back pain and myalgias.   Neurological: Negative for seizures and headaches.   Psychiatric/Behavioral: Negative for depression and sleep disturbance. The patient is nervous/anxious.    Breast: Negative for mass, mastodynia and nipple discharge          Objective:    Physical Exam:   Constitutional: She appears well-developed and well-nourished. No distress.    HENT:   Head: Normocephalic and atraumatic.    Eyes: EOM are normal.    Neck: Normal range of motion.     Pulmonary/Chest: Effort normal. No respiratory distress.        Abdominal: Soft. She exhibits no distension. There is no tenderness. There is no rebound and no guarding.     Genitourinary:  Vagina normal and uterus normal. No vaginal discharge found.   Genitourinary Comments: Vulva without any obvious lesions.  Vaginal vault with good support.  Minimal white discharge noted.  No obvious lesion.  Cervix is without any cervical motion tenderness.  No obvious lesion.  Uterus is small, non-tender, normal contour.  Adnexa is without any masses or tenderness.           Musculoskeletal: Normal range of motion.       Neurological: She is alert.    Skin: Skin is warm and dry.    Psychiatric: She has a normal mood and affect.          Assessment:        1. Syphilis, latent    2. Gonorrhea in female    3. Bacterial vaginitis             Plan:      I have discussed with the patient regarding her condition  She does not have any significant symptoms at this time.  GC, Chlam, Affirm per request  RPR, HIV, HBsAg, HCAbs   Back as needed    We will contact her for results if necessary.

## 2019-01-04 LAB
C TRACH DNA SPEC QL NAA+PROBE: NOT DETECTED
CANDIDA RRNA VAG QL PROBE: NEGATIVE
G VAGINALIS RRNA GENITAL QL PROBE: POSITIVE
N GONORRHOEA DNA SPEC QL NAA+PROBE: NOT DETECTED
T VAGINALIS RRNA GENITAL QL PROBE: NEGATIVE

## 2019-01-07 ENCOUNTER — TELEPHONE (OUTPATIENT)
Dept: OBSTETRICS AND GYNECOLOGY | Facility: CLINIC | Age: 34
End: 2019-01-07

## 2019-01-07 DIAGNOSIS — N76.0 BACTERIAL VAGINOSIS: Primary | ICD-10-CM

## 2019-01-07 DIAGNOSIS — B96.89 BACTERIAL VAGINOSIS: Primary | ICD-10-CM

## 2019-01-07 RX ORDER — METRONIDAZOLE 500 MG/1
500 TABLET ORAL EVERY 12 HOURS
Qty: 14 TABLET | Refills: 0 | Status: SHIPPED | OUTPATIENT
Start: 2019-01-07 | End: 2019-01-14

## 2019-01-10 ENCOUNTER — TELEPHONE (OUTPATIENT)
Dept: OBSTETRICS AND GYNECOLOGY | Facility: CLINIC | Age: 34
End: 2019-01-10

## 2019-01-10 NOTE — TELEPHONE ENCOUNTER
----- Message from Shasta Larry sent at 1/10/2019  2:35 PM CST -----  Contact: Self/ [phone]  Pt requesting call back from [facility] to discuss why she is taking medication that was recently prescribed. Thank you.

## 2019-01-10 NOTE — TELEPHONE ENCOUNTER
Spoke with pt BV results given. Pt asked about PAp results and when I told her that it wasn't done she was confused. Pt requesting to speak with [facility] nurse. Pt informed I would route a message to her.

## 2019-01-31 ENCOUNTER — TELEPHONE (OUTPATIENT)
Dept: OBSTETRICS AND GYNECOLOGY | Facility: CLINIC | Age: 34
End: 2019-01-31

## 2019-01-31 NOTE — TELEPHONE ENCOUNTER
Pt is aware of normal cultures except for the BV which she has already finished with the antibiotics.  Pt was advised to go have her blood work done asap so we will know the levels.  Pt voiced understanding. zack    ----- Message from Jameel Huston LPN sent at 1/29/2019  2:46 PM CST -----  Contact: Self/ [phone]      ----- Message -----  From: Lynda Fish  Sent: 1/29/2019   2:07 PM  To: [facility] Staff    Patient would like for [facility] to give her a call at her earliest convenience.  Thank you.

## 2019-05-31 ENCOUNTER — TELEPHONE (OUTPATIENT)
Dept: OBSTETRICS AND GYNECOLOGY | Facility: CLINIC | Age: 34
End: 2019-05-31

## 2019-06-05 ENCOUNTER — TELEPHONE (OUTPATIENT)
Dept: OBSTETRICS AND GYNECOLOGY | Facility: CLINIC | Age: 34
End: 2019-06-05

## 2019-06-05 NOTE — TELEPHONE ENCOUNTER
----- Message from Jennifer Mckeon sent at 6/5/2019 11:17 AM CDT -----  Contact: self  [phone]  Type: Patient Call Back    Who called: self    What is the request in detail: Would like to know if you still have lab orders for her? Please call    Would the patient rather a call back or a response via [facility]? Call back    Best call back number: [phone]

## 2019-06-18 ENCOUNTER — TELEPHONE (OUTPATIENT)
Dept: OBSTETRICS AND GYNECOLOGY | Facility: CLINIC | Age: 34
End: 2019-06-18

## 2019-06-18 DIAGNOSIS — Z11.3 SCREEN FOR STD (SEXUALLY TRANSMITTED DISEASE): Primary | ICD-10-CM

## 2019-06-18 NOTE — TELEPHONE ENCOUNTER
----- Message from Melania Garner sent at 6/18/2019  9:57 AM CDT -----  Contact: Self   Type: Patient Call Back    Who called: Self     What is the request in detail:patient called to check the status of her lab orders request     Can the clinic reply by [facility]? No     Would the patient rather a call back or a response via [facility]?  Call     Best call back number:[phone]      Patient is requesting to be retested for syphillis levels. Message has been sent to Dr Mckeon to submit lab orders

## 2019-11-21 ENCOUNTER — LAB VISIT (OUTPATIENT)
Dept: LAB | Facility: HOSPITAL | Age: 34
End: 2019-11-21
Attending: OBSTETRICS & GYNECOLOGY
Payer: MEDICAID

## 2019-11-21 ENCOUNTER — OFFICE VISIT (OUTPATIENT)
Dept: OBSTETRICS AND GYNECOLOGY | Facility: CLINIC | Age: 34
End: 2019-11-21
Payer: MEDICAID

## 2019-11-21 VITALS
DIASTOLIC BLOOD PRESSURE: 74 MMHG | SYSTOLIC BLOOD PRESSURE: 124 MMHG | HEIGHT: 64 IN | WEIGHT: 136.44 LBS | BODY MASS INDEX: 23.29 KG/M2

## 2019-11-21 DIAGNOSIS — Z01.419 WELL WOMAN EXAM WITH ROUTINE GYNECOLOGICAL EXAM: ICD-10-CM

## 2019-11-21 DIAGNOSIS — Z01.419 WELL WOMAN EXAM WITH ROUTINE GYNECOLOGICAL EXAM: Primary | ICD-10-CM

## 2019-11-21 DIAGNOSIS — M54.42 CHRONIC BILATERAL LOW BACK PAIN WITH LEFT-SIDED SCIATICA: ICD-10-CM

## 2019-11-21 DIAGNOSIS — R10.2 PELVIC PAIN IN FEMALE: ICD-10-CM

## 2019-11-21 DIAGNOSIS — G89.29 CHRONIC BILATERAL LOW BACK PAIN WITH LEFT-SIDED SCIATICA: ICD-10-CM

## 2019-11-21 DIAGNOSIS — A53.0 SYPHILIS, LATENT: ICD-10-CM

## 2019-11-21 PROCEDURE — 88141 PR  CYTOPATH CERV/VAG INTERPRET: ICD-10-PCS | Mod: ,,, | Performed by: PATHOLOGY

## 2019-11-21 PROCEDURE — 87624 HPV HI-RISK TYP POOLED RSLT: CPT

## 2019-11-21 PROCEDURE — 99395 PREV VISIT EST AGE 18-39: CPT | Mod: S$PBB,,, | Performed by: OBSTETRICS & GYNECOLOGY

## 2019-11-21 PROCEDURE — 87491 CHLMYD TRACH DNA AMP PROBE: CPT

## 2019-11-21 PROCEDURE — 86703 HIV-1/HIV-2 1 RESULT ANTBDY: CPT

## 2019-11-21 PROCEDURE — 99999 PR PBB SHADOW E&M-EST. PATIENT-LVL III: ICD-10-PCS | Mod: PBBFAC,,, | Performed by: OBSTETRICS & GYNECOLOGY

## 2019-11-21 PROCEDURE — 88141 CYTOPATH C/V INTERPRET: CPT | Mod: ,,, | Performed by: PATHOLOGY

## 2019-11-21 PROCEDURE — 88175 CYTOPATH C/V AUTO FLUID REDO: CPT | Performed by: PATHOLOGY

## 2019-11-21 PROCEDURE — 99395 PR PREVENTIVE VISIT,EST,18-39: ICD-10-PCS | Mod: S$PBB,,, | Performed by: OBSTETRICS & GYNECOLOGY

## 2019-11-21 PROCEDURE — 36415 COLL VENOUS BLD VENIPUNCTURE: CPT

## 2019-11-21 PROCEDURE — 99999 PR PBB SHADOW E&M-EST. PATIENT-LVL III: CPT | Mod: PBBFAC,,, | Performed by: OBSTETRICS & GYNECOLOGY

## 2019-11-21 PROCEDURE — 99213 OFFICE O/P EST LOW 20 MIN: CPT | Mod: PBBFAC | Performed by: OBSTETRICS & GYNECOLOGY

## 2019-11-21 PROCEDURE — 86592 SYPHILIS TEST NON-TREP QUAL: CPT

## 2019-11-21 RX ORDER — NAPROXEN 500 MG/1
500 TABLET ORAL 2 TIMES DAILY WITH MEALS
Qty: 30 TABLET | Refills: 1 | Status: SHIPPED | OUTPATIENT
Start: 2019-11-21 | End: 2019-11-22 | Stop reason: CLARIF

## 2019-11-21 NOTE — PROGRESS NOTES
Subjective:       Patient ID: Karen Mccall is a 34 y.o. female.    Chief Complaint:  Well Woman and STD CHECK      History of Present Illness  HPI  Annual Exam-Premenopausal  Patient presents for annual exam. The patient has no complaints today. The patient is sexually active. GYN screening history: last pap: approximate date 10/6/2016 and was normal. The patient wears seatbelts: yes. The patient participates in regular exercise: no. Has the patient ever been transfused or tattooed?: yes. The patient reports that there is not domestic violence in her life.    Long history of migraine headache.  On medications with neurology.  Status post tubal ligation.  History of syphilis, status post treatment.      GYN & OB History  Patient's last menstrual period was 2019.   Date of Last Pap: 10/12/2016    OB History    Para Term  AB Living   5 4 4   1 4   SAB TAB Ectopic Multiple Live Births   1     0 4      # Outcome Date GA Lbr Kemar/2nd Weight Sex Delivery Anes PTL Lv   5 Term 14 39w2d  2.432 kg (5 lb 5.8 oz) F Vag-Spont EPI N MAYDA   4 Term 10/03/12 39w0d  3.164 kg (6 lb 15.6 oz) M INDUCTION EPI N MAYDA   3 Term 05   3.317 kg (7 lb 5 oz) M Vag-Spont EPI N MAYDA   2 Term 03   3.317 kg (7 lb 5 oz) M Vag-Spont EPI N MAYDA   1 SAB 02             Past Medical History:   Diagnosis Date    Anxiety     Asthma     as a child, no recent episodes    GERD (gastroesophageal reflux disease)     Mental disorder     Migraine headache     Smoker     Umbilical hernia     Vaginal delivery     x4       Past Surgical History:   Procedure Laterality Date    DILATION AND CURETTAGE OF UTERUS      HYSTEROSCOPY      TUBAL LIGATION      VAGINAL DELIVERY      x 4       Family History   Problem Relation Age of Onset    Cancer Neg Hx     Eclampsia Neg Hx        Social History     Socioeconomic History    Marital status: Single     Spouse name: Not on file    Number of children: Not on file    Years  of education: Not on file    Highest education level: Not on file   Occupational History    Not on file   Social Needs    Financial resource strain: Not on file    Food insecurity:     Worry: Not on file     Inability: Not on file    Transportation needs:     Medical: Not on file     Non-medical: Not on file   Tobacco Use    Smoking status: Current Every Day Smoker     Packs/day: 1.00     Years: 5.00     Pack years: 5.00    Smokeless tobacco: Never Used   Substance and Sexual Activity    Alcohol use: Yes     Comment: socially    Drug use: No    Sexual activity: Yes     Partners: Male     Birth control/protection: None   Lifestyle    Physical activity:     Days per week: Not on file     Minutes per session: Not on file    Stress: Not on file   Relationships    Social connections:     Talks on phone: Not on file     Gets together: Not on file     Attends Islam service: Not on file     Active member of club or organization: Not on file     Attends meetings of clubs or organizations: Not on file     Relationship status: Not on file   Other Topics Concern    Not on file   Social History Narrative    Together since 2011    She is a homemaker.           Current Outpatient Medications   Medication Sig Dispense Refill    butalbital-acetaminophen-caffeine -40 mg (FIORICET, ESGIC) -40 mg per tablet Take 1 tablet by mouth once daily. 30 tablet 0    cyclobenzaprine (FLEXERIL) 5 MG tablet Take 1 tablet (5 mg total) by mouth nightly as needed. 30 tablet 1    cyproheptadine (PERIACTIN) 4 mg tablet Take 1 tablet (4 mg total) by mouth 3 (three) times daily as needed. 60 tablet 2    fluticasone (FLONASE) 50 mcg/actuation nasal spray 1-2 sprays by Each Nare route once daily. 1 Bottle 0    gabapentin (NEURONTIN) 300 MG capsule Take 1 capsule (300 mg total) by mouth 2 (two) times daily. 60 capsule 5    hydrOXYzine pamoate (VISTARIL) 25 MG Cap Take 1 capsule (25 mg total) by mouth every 8 (eight) hours  as needed. 90 capsule 1    ibuprofen (ADVIL,MOTRIN) 600 MG tablet Take 1 tablet (600 mg total) by mouth every 6 (six) hours as needed for Pain. 20 tablet 0    loratadine (CLARITIN) 10 mg tablet Take 1 tablet (10 mg total) by mouth once daily. 30 tablet 5    meclizine (ANTIVERT) 25 mg tablet Take 1 tablet (25 mg total) by mouth 3 (three) times daily as needed. 30 tablet 2    metoprolol tartrate (LOPRESSOR) 25 MG tablet Take 0.5 tablets (12.5 mg total) by mouth 2 (two) times daily. 60 tablet 5    naproxen (NAPROSYN) 500 MG tablet Take 1 tablet (500 mg total) by mouth 2 (two) times daily with meals. 30 tablet 1    paroxetine (PAXIL) 20 MG tablet Take 1 tablet (20 mg total) by mouth every morning. 30 tablet 5    topiramate (TOPAMAX) 25 MG tablet TAKE ONE TABLET BY MOUTH TWICE DAILY 60 tablet 3    traMADol (ULTRAM) 50 mg tablet TAKE ONE TABLET BY MOUTH EVERY TWELVE HOURS AS NEEDED 30 tablet 0    omeprazole (PRILOSEC) 40 MG capsule Take 1 capsule (40 mg total) by mouth once daily. 30 capsule 11    ondansetron (ZOFRAN) 4 MG tablet Take 1 tablet (4 mg total) by mouth every 6 (six) hours as needed for Nausea. (Patient not taking: Reported on 11/21/2019) 10 tablet 0    sulfamethoxazole-trimethoprim 800-160mg (BACTRIM DS) 800-160 mg Tab Take 1 tablet by mouth 2 (two) times daily. (Patient not taking: Reported on 11/21/2019) 14 tablet 1    topiramate (TOPAMAX) 25 MG tablet TAKE ONE TABLET BY MOUTH TWICE DAILY 60 tablet 3     No current facility-administered medications for this visit.        Review of patient's allergies indicates:  No Known Allergies      Review of Systems  Review of Systems   Constitutional: Negative for activity change, appetite change, chills, fatigue, fever and unexpected weight change.   HENT: Negative for mouth sores.    Respiratory: Negative for cough, shortness of breath and wheezing.    Cardiovascular: Negative for chest pain and palpitations.   Gastrointestinal: Negative for abdominal  pain, bloating, blood in stool, constipation, nausea and vomiting.   Endocrine: Negative for diabetes and hot flashes.   Genitourinary: Negative for dysmenorrhea, dyspareunia, dysuria, frequency, hematuria, menorrhagia, menstrual problem, pelvic pain, urgency, vaginal bleeding, vaginal discharge, vaginal pain, urinary incontinence, postcoital bleeding and vaginal odor.   Musculoskeletal: Negative for back pain and myalgias.   Integumentary:  Negative for rash, breast mass and nipple discharge.   Neurological: Negative for seizures and headaches.   Psychiatric/Behavioral: Negative for depression and sleep disturbance. The patient is nervous/anxious.    Breast: Negative for mass, mastodynia and nipple discharge          Objective:    Physical Exam:   Constitutional: She appears well-developed and well-nourished. No distress.    HENT:   Head: Normocephalic and atraumatic.    Eyes: EOM are normal.    Neck: Normal range of motion.     Pulmonary/Chest: Effort normal. No respiratory distress.   Breasts: Non-tender, no engorgement, no masses, no retraction, no discharge. Negative for lymphadenopathy.         Abdominal: Soft. She exhibits no distension. There is no tenderness. There is no rebound and no guarding.     Genitourinary: Vagina normal and uterus normal. No vaginal discharge found.   Genitourinary Comments: Vulva without any obvious lesions.  Urethral meatus normal size and location without any lesion.  Urethra is non-tender without stricture or discharge.  Bladder is non-tender.  Vaginal vault with good support.  Minimal white discharge noted.  No obvious lesion.  Normal rugation.  Cervix is without any cervical motion tenderness.  No obvious lesion.  Uterus is small, non-tender, normal contour.  Adnexa is without any masses or tenderness.  Perineum without obvious lesion.    Tender over right symphysis pubis.           Musculoskeletal: Normal range of motion.       Neurological: She is alert.    Skin: Skin is  warm and dry.    Psychiatric: She has a normal mood and affect.          Assessment:        1. Well woman exam with routine gynecological exam    2. Syphilis, latent    3.  Pelvic pain          Plan:          I have discussed with the patient her condition.  Monthly breast examination was instructed, discussed, and encouraged.  Patient was encouraged to consume a low-calorie, low fat diet, and to increase of physical activity.  Healthy habits encouraged.  A Pap smear was performed with HR-HPV according to the USPSTF recommendations.  Mammogram was not ordered because of the combination of her age and risk factors, according to ACOG guidelines.  Gonorrhea and Chlamydia testing performed;  HIV test ordered, again according to guidelines.  Colonoscopy discussed according to ACS guideline.  We also discussed her obstetric history and CV risks.   RPR ordered for history of syphilis.  Naproxen for pain  Patient is to continue her medications as prescribed.  She will come back to see me in one year for her annual visit.  She can come back to see me sooner as necessary.  All of her questions were answered appropriately to her satisfaction.

## 2019-11-22 ENCOUNTER — HOSPITAL ENCOUNTER (EMERGENCY)
Facility: HOSPITAL | Age: 34
Discharge: HOME | End: 2019-11-22
Attending: EMERGENCY MEDICINE
Payer: MEDICAID

## 2019-11-22 VITALS
HEIGHT: 63 IN | BODY MASS INDEX: 24.27 KG/M2 | SYSTOLIC BLOOD PRESSURE: 116 MMHG | HEART RATE: 89 BPM | OXYGEN SATURATION: 99 % | WEIGHT: 137 LBS | RESPIRATION RATE: 17 BRPM | TEMPERATURE: 99 F | DIASTOLIC BLOOD PRESSURE: 78 MMHG

## 2019-11-22 DIAGNOSIS — S16.1XXA CERVICAL STRAIN, ACUTE, INITIAL ENCOUNTER: Primary | ICD-10-CM

## 2019-11-22 DIAGNOSIS — S39.012A STRAIN OF LUMBAR REGION, INITIAL ENCOUNTER: ICD-10-CM

## 2019-11-22 DIAGNOSIS — S09.90XA CLOSED HEAD INJURY: ICD-10-CM
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
## 2019-11-22 LAB
C TRACH DNA SPEC QL NAA+PROBE: NOT DETECTED
HIV 1+2 AB+HIV1 P24 AG SERPL QL IA: NEGATIVE
N GONORRHOEA DNA SPEC QL NAA+PROBE: NOT DETECTED
RPR SER QL: NORMAL

## 2019-11-22 PROCEDURE — 99284 EMERGENCY DEPT VISIT MOD MDM: CPT | Mod: 25

## 2019-11-22 RX ORDER — IBUPROFEN 600 MG/1
600 TABLET ORAL EVERY 6 HOURS PRN
Qty: 20 TABLET | Refills: 0 | Status: SHIPPED | OUTPATIENT
Start: 2019-11-22 | End: 2019-12-07 | Stop reason: ALTCHOICE
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
## 2019-11-22 NOTE — DISCHARGE INSTRUCTIONS
Please apply ice to injuries for 24 hr, then you may use heat.  Please return immediately if you get worse or if new problems develop.  Please follow-up with your primary care doctor in 3 days.  Return immediately if you get worse or if new problems develop.
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
## 2019-11-29 LAB
HPV HR 12 DNA SPEC QL NAA+PROBE: NEGATIVE
HPV16 AG SPEC QL: NEGATIVE
HPV18 DNA SPEC QL NAA+PROBE: NEGATIVE

## 2019-12-05 LAB
FINAL PATHOLOGIC DIAGNOSIS: ABNORMAL
Lab: ABNORMAL

## 2019-12-07 ENCOUNTER — HOSPITAL ENCOUNTER (EMERGENCY)
Facility: HOSPITAL | Age: 34
Discharge: HOME | End: 2019-12-07
Attending: EMERGENCY MEDICINE
Payer: MEDICAID

## 2019-12-07 VITALS
SYSTOLIC BLOOD PRESSURE: 103 MMHG | OXYGEN SATURATION: 97 % | DIASTOLIC BLOOD PRESSURE: 65 MMHG | HEART RATE: 64 BPM | BODY MASS INDEX: 22.36 KG/M2 | WEIGHT: 131 LBS | HEIGHT: 64 IN | RESPIRATION RATE: 18 BRPM | TEMPERATURE: 98 F
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
## 2019-12-07 DIAGNOSIS — S82.64XA CLOSED NONDISPLACED FRACTURE OF LATERAL MALLEOLUS OF RIGHT FIBULA, INITIAL ENCOUNTER: Primary | ICD-10-CM

## 2019-12-07 LAB
B-HCG UR QL: NEGATIVE
CTP QC/QA: YES

## 2019-12-07 PROCEDURE — 81025 URINE PREGNANCY TEST: CPT | Performed by: NURSE PRACTITIONER

## 2019-12-07 PROCEDURE — 29515 APPLICATION SHORT LEG SPLINT: CPT

## 2019-12-07 PROCEDURE — 25000003 PHARM REV CODE 250: Performed by: NURSE PRACTITIONER

## 2019-12-07 PROCEDURE — 63600175 PHARM REV CODE 636 W HCPCS: Performed by: NURSE PRACTITIONER

## 2019-12-07 PROCEDURE — 96372 THER/PROPH/DIAG INJ SC/IM: CPT | Mod: 59

## 2019-12-07 PROCEDURE — 99284 EMERGENCY DEPT VISIT MOD MDM: CPT | Mod: 25

## 2019-12-07 RX ORDER — KETOROLAC TROMETHAMINE 30 MG/ML
30 INJECTION, SOLUTION INTRAMUSCULAR; INTRAVENOUS
Status: COMPLETED | OUTPATIENT
Start: 2019-12-07 | End: 2019-12-07

## 2019-12-07 RX ORDER — BUSPIRONE HYDROCHLORIDE 15 MG/1
15 TABLET ORAL 3 TIMES DAILY
COMMUNITY
End: 2022-05-30 | Stop reason: SDUPTHER

## 2019-12-07 RX ORDER — CLONAZEPAM 0.5 MG/1
0.5 TABLET ORAL 2 TIMES DAILY PRN
COMMUNITY
End: 2021-04-21 | Stop reason: SDUPTHER

## 2019-12-07 RX ORDER — OXCARBAZEPINE 600 MG/1
150 TABLET, FILM COATED ORAL 2 TIMES DAILY
COMMUNITY
End: 2020-03-13 | Stop reason: SDUPTHER

## 2019-12-07 RX ORDER — NAPROXEN 500 MG/1
500 TABLET ORAL EVERY 12 HOURS PRN
Qty: 20 TABLET | Refills: 0 | Status: SHIPPED | OUTPATIENT
Start: 2019-12-07 | End: 2020-01-13 | Stop reason: SDUPTHER

## 2019-12-07 RX ORDER — TOPIRAMATE 25 MG/1
25 TABLET ORAL 2 TIMES DAILY
COMMUNITY
End: 2020-02-14

## 2019-12-07 RX ORDER — OXYCODONE AND ACETAMINOPHEN 5; 325 MG/1; MG/1
1 TABLET ORAL
Status: COMPLETED | OUTPATIENT
Start: 2019-12-07 | End: 2019-12-07

## 2019-12-07 RX ORDER — MECLIZINE HYDROCHLORIDE 25 MG/1
25 TABLET ORAL 3 TIMES DAILY PRN
COMMUNITY
End: 2020-02-12 | Stop reason: SDUPTHER

## 2019-12-07 RX ORDER — HYDROCODONE BITARTRATE AND ACETAMINOPHEN 5; 325 MG/1; MG/1
1 TABLET ORAL EVERY 6 HOURS PRN
Qty: 6 TABLET | Refills: 0 | Status: SHIPPED | OUTPATIENT
Start: 2019-12-07 | End: 2020-10-09 | Stop reason: ALTCHOICE

## 2019-12-07 RX ORDER — METOPROLOL TARTRATE 25 MG/1
25 TABLET, FILM COATED ORAL 2 TIMES DAILY
COMMUNITY
End: 2020-11-17

## 2019-12-07 RX ADMIN — OXYCODONE HYDROCHLORIDE AND ACETAMINOPHEN 1 TABLET: 5; 325 TABLET ORAL at 12:12

## 2019-12-07 RX ADMIN — KETOROLAC TROMETHAMINE 30 MG: 30 INJECTION, SOLUTION INTRAMUSCULAR; INTRAVENOUS at 12:12

## 2019-12-07 NOTE — DISCHARGE INSTRUCTIONS
Take pain medications as prescribed.  Do not drive or drink alcohol when taking pain medications because they will make you drowsy.  Do not get splint wet.  Do not put weight on the affected ankle.    Follow-up with Orthopedics as discussed.  Return to the emergency department immediately for any new or worsening symptoms.    Thank you for coming to our Emergency Department today. It is important to remember that some problems are difficult to diagnose and may not be found during your first visit. Be sure to follow up with your primary care doctor.  If you do not have one, you may contact the one listed on your discharge paperwork or you may also call the [facility] Appointment Desk at [phone] to schedule an appointment with one.     Return to the ER with any questions/concerns, new/concerning symptoms, worsening or failure to improve. Do not drive or make any important decisions for 24 hours if you have received any pain medications, sedatives or mood altering drugs during your ER visit.

## 2019-12-07 NOTE — ED TRIAGE NOTES
Patient states she fell down a flight of stairs inside the home on last night. Now reports pain to right ankle, with swelling and bruising noted. Inability to bear weight. Reports taking Ibuprofen for the pain, last taken on last night.

## 2019-12-07 NOTE — ED PROVIDER NOTES
Encounter Date: 12/7/2019    SCRIBE #1 NOTE: I, Maria Elena Dias, am scribing for, and in the presence of,  Maxi Duran NP. I have scribed the following portions of the note - Other sections scribed: HPI,ROS,PE.       History     Chief Complaint   Patient presents with    Ankle Pain     C/o right ankle pain     CC: Ankle pain    HPI:  This is a 34 y.o. female with a PMHx of Anxiety who presents to the Emergency Department with a cc of right ankle pain status post a fall last night. Pt reports she tripped and fell twisting her ankle in. Reports right ankle swelling and pain, superficial left knee abrasion, and bruising to left great toe. Denies any further complaints. Denies taking any medications prior to arrival. No alleviating factors. No known drug allergies.            Review of patient's allergies indicates:  No Known Allergies  Past Medical History:   Diagnosis Date    Anxiety     Asthma     as a child, no recent episodes    GERD (gastroesophageal reflux disease)     Mental disorder     Migraine headache     Smoker     Umbilical hernia     Vaginal delivery     x4     Past Surgical History:   Procedure Laterality Date    DILATION AND CURETTAGE OF UTERUS      HYSTEROSCOPY      TUBAL LIGATION      VAGINAL DELIVERY      x 4     Family History   Problem Relation Age of Onset    Cancer Neg Hx     Eclampsia Neg Hx      Social History     Tobacco Use    Smoking status: Current Every Day Smoker     Packs/day: 1.00     Years: 5.00     Pack years: 5.00    Smokeless tobacco: Never Used   Substance Use Topics    Alcohol use: Yes     Comment: socially    Drug use: No     Review of Systems   Constitutional: Negative for chills and fever.   HENT: Negative for congestion and sore throat.    Eyes: Negative for visual disturbance.   Respiratory: Negative for cough and shortness of breath.    Cardiovascular: Negative for chest pain.   Gastrointestinal: Negative for abdominal pain, nausea and vomiting.    Genitourinary: Negative for dysuria and vaginal discharge.   Musculoskeletal:        (+)ankle pain  (+)ankle swelling  (+)bruises   Skin: Negative for rash.   Allergic/Immunologic: Negative for immunocompromised state.   Neurological: Negative for headaches.       Physical Exam     Initial Vitals [12/07/19 1205]   BP Pulse Resp Temp SpO2   111/61 77 16 97.9 °F (36.6 °C) 98 %      MAP       --         Physical Exam    Nursing note and vitals reviewed.  Constitutional: Vital signs are normal. She appears well-developed and well-nourished. She is not diaphoretic. She is active and cooperative. No distress.   HENT:   Head: Normocephalic and atraumatic.   Right Ear: External ear normal.   Left Ear: External ear normal.   Nose: Nose normal.   Mouth/Throat: Oropharynx is clear and moist.   Eyes: Conjunctivae and EOM are normal. Right eye exhibits no discharge. Left eye exhibits no discharge.   Neck: Normal range of motion. Neck supple. No tracheal deviation present.   Cardiovascular: Normal rate.   Pulmonary/Chest: No stridor. No respiratory distress.   Abdominal: Soft. Bowel sounds are normal. She exhibits no distension and no mass. There is no tenderness. There is no rigidity, no rebound, no guarding, no CVA tenderness, no tenderness at McBurney's point and negative Garland's sign.   Musculoskeletal: Normal range of motion. She exhibits edema and tenderness.   Swelling, bruising, and tenderness over lateral medial malleolus. Mild bruising to left great toe. No deformities. Normal pulses.   Neurological: She is alert and oriented to person, place, and time. She has normal strength and normal reflexes. No cranial nerve deficit or sensory deficit.   Skin: Skin is warm and dry. Capillary refill takes less than 2 seconds.   Psychiatric: She has a normal mood and affect. Her behavior is normal. Judgment and thought content normal.         ED Course   Splint Application  Date/Time: 12/7/2019 2:58 PM  Performed by: Maxi YOUSSEF  "Duran, NP  Authorized by: Gerard Bautista MD   Consent Done: Yes  Consent: Verbal consent obtained.  Risks and benefits: risks, benefits and alternatives were discussed  Consent given by: patient  Patient understanding: patient states understanding of the procedure being performed  Patient consent: the patient's understanding of the procedure matches consent given  Site marked: the operative site was marked  Imaging studies: imaging studies available  Required items: required blood products, implants, devices, and special equipment available  Patient identity confirmed: , name, verbally with patient, MRN and provided demographic data  Time out: Immediately prior to procedure a "time out" was called to verify the correct patient, procedure, equipment, support staff and site/side marked as required.  Location details: right ankle  Splint type: short leg (with ankle stirrup)  Supplies used: cotton padding,  Ortho-Glass and elastic bandage  Post-procedure: The splinted body part was neurovascularly unchanged following the procedure.  Patient tolerance: Patient tolerated the procedure well with no immediate complications        Labs Reviewed   POCT URINE PREGNANCY          Imaging Results          X-Ray Ankle Complete Right (Final result)  Result time 19 13:15:04    Final result by Rony Vega MD (19 13:15:04)                 Impression:      Lateral malleolar acute, nondisplaced fracture.      Electronically signed by: Rony Vega MD  Date:    2019  Time:    13:15             Narrative:    EXAMINATION:  XR ANKLE COMPLETE 3 VIEW RIGHT; XR FOOT COMPLETE 3 VIEW RIGHT    CLINICAL HISTORY:  Fall on same level from slipping, tripping and stumbling without subsequent striking against object, initial encounter    TECHNIQUE:  AP, lateral, and oblique images of the right ankle and foot were performed.    COMPARISON:  Right foot series 2017 and right ankle series 2015    FINDINGS:  Bones are " well mineralized. Overall alignment is within normal limits.  Nondisplaced fracture through the lateral malleolus with associated mild overlying soft tissue swelling.  Remainder of the ankle mortise is intact.  Unchanged probable small bone island at the base of the 5th metatarsal.  Lisfranc articulation is congruent.  No dislocation or destructive osseous process. Joint spaces appear relatively maintained. No subcutaneous emphysema or radiodense retained foreign body.                               X-Ray Foot Complete Right (Final result)  Result time 12/07/19 13:15:04    Final result by Rony Vega MD (12/07/19 13:15:04)                 Impression:      Lateral malleolar acute, nondisplaced fracture.      Electronically signed by: Rony Vega MD  Date:    12/07/2019  Time:    13:15             Narrative:    EXAMINATION:  XR ANKLE COMPLETE 3 VIEW RIGHT; XR FOOT COMPLETE 3 VIEW RIGHT    CLINICAL HISTORY:  Fall on same level from slipping, tripping and stumbling without subsequent striking against object, initial encounter    TECHNIQUE:  AP, lateral, and oblique images of the right ankle and foot were performed.    COMPARISON:  Right foot series 07/13/2017 and right ankle series 05/17/2015    FINDINGS:  Bones are well mineralized. Overall alignment is within normal limits.  Nondisplaced fracture through the lateral malleolus with associated mild overlying soft tissue swelling.  Remainder of the ankle mortise is intact.  Unchanged probable small bone island at the base of the 5th metatarsal.  Lisfranc articulation is congruent.  No dislocation or destructive osseous process. Joint spaces appear relatively maintained. No subcutaneous emphysema or radiodense retained foreign body.                               X-Ray Foot Complete Left (Final result)  Result time 12/07/19 13:17:22    Final result by Rony Vega MD (12/07/19 13:17:22)                 Impression:      No acute displaced fracture-dislocation  identified.      Electronically signed by: Rony Vega MD  Date:    12/07/2019  Time:    13:17             Narrative:    EXAMINATION:  XR FOOT COMPLETE 3 VIEW LEFT    CLINICAL HISTORY:  .  Fall on same level from slipping, tripping and stumbling without subsequent striking against object, initial encounter    TECHNIQUE:  AP, lateral and oblique views of the left foot were performed.    COMPARISON:  Left ankle series 07/13/2017    FINDINGS:  Metallic ring jewelry in place at the 3rd toe.  No subcutaneous emphysema.  Bones are well mineralized.  Overall alignment is within normal limits.  Lisfranc articulation is congruent.  No displaced fracture, dislocation or destructive osseous process.  Joint spaces appear maintained.                                 Medical Decision Making:   History:   Old Medical Records: I decided to obtain old medical records.  Differential Diagnosis:   Fracture, dislocation, sprain, others  Clinical Tests:   Radiological Study: Ordered and Reviewed  ED Management:  HPI and physical exam as above.    34-year-old female presenting for evaluation of right ankle pain secondary to tripping and falling last night.  Bruising and tenderness overlying the right lateral malleolus.  No evidence of neurovascular compromise.  No evidence of patellar tendon defect.  No ligamentous laxity.  X-ray without evidence of a nondisplaced fracture of the lateral malleolus.  No other acute abnormalities.  Patient placed in a posterior short-leg splint with ankle stirrup without complication.  Patient given crutches and instructed not to bear weight on the affected extremity.  Information on this case will be faxed to [facility] for follow-up with [facility] orth[facility] due to the patient's insurance.  Prescribed NSAIDs and a short course of Norco at discharge.  Advised patient to follow up with Orthopedics for re-evaluation and further management.  ED return precautions given. All questions regarding diagnosis and plan were  answered to the patient's fullest possible satisfaction. Patient expressed understanding of diagnosis, discharge instructions, and return precautions.              Scribe Attestation:   Scribe #1: I performed the above scribed service and the documentation accurately describes the services I performed. I attest to the accuracy of the note.                    Clinical Impression:       ICD-10-CM ICD-9-CM   1. Closed nondisplaced fracture of lateral malleolus of right fibula, initial encounter S82.64XA 824.2   2. Fall from slip, trip, or stumble, initial encounter W01.0XXA E885.9     Disposition:   Disposition: Discharged  Condition: Stable    I, Maxi Duran NP, personally performed the services described in this documentation. All medical record entries made by the scribe were at my direction and in my presence. I have reviewed the chart and agree that the record reflects my personal performance and is accurate and complete.               Maxi Duran NP  12/07/19 [id number]

## 2019-12-10 ENCOUNTER — HOSPITAL ENCOUNTER (EMERGENCY)
Facility: HOSPITAL | Age: 34
Discharge: HOME | End: 2019-12-10
Attending: EMERGENCY MEDICINE
Payer: MEDICAID

## 2019-12-10 VITALS
WEIGHT: 130 LBS | HEIGHT: 64 IN | OXYGEN SATURATION: 97 % | TEMPERATURE: 98 F | RESPIRATION RATE: 18 BRPM | HEART RATE: 90 BPM | BODY MASS INDEX: 22.2 KG/M2 | SYSTOLIC BLOOD PRESSURE: 112 MMHG | DIASTOLIC BLOOD PRESSURE: 75 MMHG

## 2019-12-10 DIAGNOSIS — S82.64XD CLOSED NONDISPLACED FRACTURE OF LATERAL MALLEOLUS OF RIGHT FIBULA WITH ROUTINE HEALING, SUBSEQUENT ENCOUNTER: Primary | ICD-10-CM

## 2019-12-10 LAB
B-HCG UR QL: NEGATIVE
CTP QC/QA: YES

## 2019-12-10 PROCEDURE — 96372 THER/PROPH/DIAG INJ SC/IM: CPT

## 2019-12-10 PROCEDURE — 81025 URINE PREGNANCY TEST: CPT | Performed by: PHYSICIAN ASSISTANT

## 2019-12-10 PROCEDURE — 63600175 PHARM REV CODE 636 W HCPCS: Performed by: PHYSICIAN ASSISTANT

## 2019-12-10 PROCEDURE — 99284 EMERGENCY DEPT VISIT MOD MDM: CPT | Mod: 25

## 2019-12-10 RX ORDER — MELOXICAM 15 MG/1
15 TABLET ORAL DAILY
Qty: 10 TABLET | Refills: 0 | Status: SHIPPED | OUTPATIENT
Start: 2019-12-10 | End: 2020-03-13 | Stop reason: SDUPTHER

## 2019-12-10 RX ORDER — HYDROMORPHONE HYDROCHLORIDE 2 MG/ML
1 INJECTION, SOLUTION INTRAMUSCULAR; INTRAVENOUS; SUBCUTANEOUS
Status: COMPLETED | OUTPATIENT
Start: 2019-12-10 | End: 2019-12-10

## 2019-12-10 RX ADMIN — HYDROMORPHONE HYDROCHLORIDE 1 MG: 2 INJECTION, SOLUTION INTRAMUSCULAR; INTRAVENOUS; SUBCUTANEOUS at 06:12

## 2019-12-11 NOTE — ED PROVIDER NOTES
Encounter Date: 12/10/2019       History     Chief Complaint   Patient presents with    Foot Injury     pt was here x3 days ago and had splint placed to R LE. states needs splint to be replaced. pt states ortho was called. reports increasing pain.      Chief Complaint:  Ankle pain  History of  Present Illness: History obtained from patient. This 34 y.o. female who has no known past medical history presents to the ED complaining of right ankle pain status post mechanical trip and fall 3 days ago.  Patient was evaluated in this ED and diagnosed with a distal fibula fracture.  Patient was placed in a splint and encouraged follow-up with Orthopedics.  Patient has yet to schedule point with Orthopedics.  She states she took pain medication as prescribed at home without relief.  She states she felt the splint became too tight in attempted to unwrap the dressing.  However, she states she was unsuccessful in doing so.  Denies numbness, tingling, weakness, fever, chills.        Review of patient's allergies indicates:  No Known Allergies  Past Medical History:   Diagnosis Date    Anxiety     Asthma     as a child, no recent episodes    GERD (gastroesophageal reflux disease)     Mental disorder     Migraine headache     Smoker     Umbilical hernia     Vaginal delivery     x4     Past Surgical History:   Procedure Laterality Date    DILATION AND CURETTAGE OF UTERUS      HYSTEROSCOPY      TUBAL LIGATION      VAGINAL DELIVERY      x 4     Family History   Problem Relation Age of Onset    Cancer Neg Hx     Eclampsia Neg Hx      Social History     Tobacco Use    Smoking status: Current Every Day Smoker     Packs/day: 1.00     Years: 5.00     Pack years: 5.00    Smokeless tobacco: Never Used   Substance Use Topics    Alcohol use: Yes     Comment: socially    Drug use: No     Review of Systems   Constitutional: Negative for chills and fever.   HENT: Negative for congestion, rhinorrhea and sore throat.    Eyes:  Negative for visual disturbance.   Respiratory: Negative for cough and shortness of breath.    Cardiovascular: Negative for chest pain.   Gastrointestinal: Negative for abdominal pain, diarrhea, nausea and vomiting.   Genitourinary: Negative for dysuria, frequency and hematuria.   Musculoskeletal: Positive for arthralgias (Right ankle). Negative for back pain.   Skin: Negative for rash.   Neurological: Negative for dizziness, weakness, numbness and headaches.       Physical Exam     Initial Vitals [12/10/19 1807]   BP Pulse Resp Temp SpO2   112/75 90 18 98.1 °F (36.7 °C) 97 %      MAP       --         Physical Exam    Nursing note and vitals reviewed.  Constitutional: She appears well-developed and well-nourished. No distress.   HENT:   Head: Normocephalic and atraumatic.   Nose: Nose normal.   Mouth/Throat: Oropharynx is clear and moist.   Eyes: EOM are normal. Pupils are equal, round, and reactive to light.   Neck: Normal range of motion. Neck supple.   Cardiovascular: Normal rate, regular rhythm and normal heart sounds. Exam reveals no gallop and no friction rub.    No murmur heard.  Pulmonary/Chest: Effort normal and breath sounds normal. No respiratory distress. She has no wheezes. She has no rhonchi. She has no rales.   Abdominal: Soft. Bowel sounds are normal. There is no tenderness. There is no rebound and no guarding.   Musculoskeletal: Normal range of motion.   Patient has a splint in place to the right lower extremity.   Neurological: She is alert and oriented to person, place, and time. She has normal strength. No cranial nerve deficit or sensory deficit.   Skin: Skin is warm and dry. Capillary refill takes less than 2 seconds.   Psychiatric: She has a normal mood and affect.         ED Course   Procedures  Labs Reviewed   POCT URINE PREGNANCY          Imaging Results    None          Medical Decision Making:   ED Management:  This is an evaluation of a 34 y.o. female who presents to the ED for right  ankle pain status post mechanical trip and fall.  Vital signs are stable.   Afebrile.  Patient is nontoxic appearing and in no acute distress. Patient's chart was reviewed.  Patient was evaluated 3 days ago for similar complaints status post mechanical trip and fall.  Patient had x-rays performed which showed nondisplaced closed lateral malleolar fracture.  Patient was placed in the splint.  Patient presents today for increased pain. Splint was removed in foot was examined.  Patient is neurovascularly intact. Splint was reapplied with improvement of pain. Patient treated for pain in the ED.  Patient encouraged to follow up with Orthopedics.    Patient given return precautions and instructed to return to the emergency department for any new or worsening symptoms. Patient verbalized understanding and agreed with plan.                                  Clinical Impression:       ICD-10-CM ICD-9-CM   1. Closed nondisplaced fracture of lateral malleolus of right fibula with routine healing, subsequent encounter S82.64XD V54.19                             Joaquni Valentino PA-C  12/10/19 1900

## 2019-12-13 ENCOUNTER — TELEPHONE (OUTPATIENT)
Dept: OBSTETRICS AND GYNECOLOGY | Facility: CLINIC | Age: 34
End: 2019-12-13

## 2019-12-13 NOTE — TELEPHONE ENCOUNTER
Called and explained all results to pt per request. All questions answered accordingly. jtn    ----- Message from Ginny Raines MA sent at 12/9/2019  1:25 PM CST -----  Contact: self  I tried to explain to this patient her results and she is not listening to anything I tell her. She only wants to speak with you  ----- Message -----  From: Sherri Rodas  Sent: 12/9/2019  11:58 AM CST  To: Mike IRVING Staff    Type: Patient Call Back    Who called:self    What is the request in detail:pt is asking to speak to [facility]    Can the clinic reply by [facility]?no    Would the patient rather a call back or a response via [facility]? call    Best call back number:

## 2019-12-18 ENCOUNTER — HOSPITAL ENCOUNTER (EMERGENCY)
Facility: HOSPITAL | Age: 34
Discharge: HOME | End: 2019-12-18
Attending: EMERGENCY MEDICINE
Payer: MEDICAID

## 2019-12-18 VITALS
TEMPERATURE: 98 F | BODY MASS INDEX: 23.92 KG/M2 | HEART RATE: 64 BPM | WEIGHT: 135 LBS | RESPIRATION RATE: 18 BRPM | SYSTOLIC BLOOD PRESSURE: 115 MMHG | HEIGHT: 63 IN | OXYGEN SATURATION: 96 % | DIASTOLIC BLOOD PRESSURE: 81 MMHG

## 2019-12-18 DIAGNOSIS — M25.571 RIGHT ANKLE PAIN, UNSPECIFIED CHRONICITY: Primary | ICD-10-CM

## 2019-12-18 LAB
B-HCG UR QL: NEGATIVE
CTP QC/QA: YES

## 2019-12-18 PROCEDURE — 63600175 PHARM REV CODE 636 W HCPCS: Performed by: PHYSICIAN ASSISTANT

## 2019-12-18 PROCEDURE — 81025 URINE PREGNANCY TEST: CPT | Performed by: EMERGENCY MEDICINE

## 2019-12-18 PROCEDURE — 99284 EMERGENCY DEPT VISIT MOD MDM: CPT | Mod: 25

## 2019-12-18 PROCEDURE — 96372 THER/PROPH/DIAG INJ SC/IM: CPT

## 2019-12-18 RX ORDER — HYDROMORPHONE HYDROCHLORIDE 2 MG/ML
0.5 INJECTION, SOLUTION INTRAMUSCULAR; INTRAVENOUS; SUBCUTANEOUS
Status: COMPLETED | OUTPATIENT
Start: 2019-12-18 | End: 2019-12-18

## 2019-12-18 RX ADMIN — HYDROMORPHONE HYDROCHLORIDE 0.5 MG: 2 INJECTION, SOLUTION INTRAMUSCULAR; INTRAVENOUS; SUBCUTANEOUS at 10:12

## 2019-12-19 NOTE — ED PROVIDER NOTES
Encounter Date: 12/18/2019       History     Chief Complaint   Patient presents with    Foot Pain     was dx with closed ankle fracture Dec 11 th here to right ankle. she reports continued pain. patient arrives with splint in place. Doesn't have apt with Ortho Jan 8th. No numbness or tingling. Patient able to move toes, circulation present. bruising noted  took IBU yesterday with minimal relief     34-year-old female smoker with history of anxiety, childhood asthma, GERD, migraine headaches, history of umbilical hernia, with chief complaint persistent right ankle pain following lateral malleolus fracture on 12/07/2019.  She admits to pain and swelling of the ankle, swelling is relief with RICE precautions.  She admits to painful spots of the splint.  She denies repeat trauma.  Denies weight-bearing status.  No erythema or warmth.  No cold toes or cold foot.  There is mild relief with Ultram.  Symptoms are acute, constant, severity 6/10.        Review of patient's allergies indicates:  No Known Allergies  Past Medical History:   Diagnosis Date    Anxiety     Asthma     as a child, no recent episodes    GERD (gastroesophageal reflux disease)     Mental disorder     Migraine headache     Smoker     Umbilical hernia     Vaginal delivery     x4     Past Surgical History:   Procedure Laterality Date    DILATION AND CURETTAGE OF UTERUS      HYSTEROSCOPY      TUBAL LIGATION      VAGINAL DELIVERY      x 4     Family History   Problem Relation Age of Onset    Cancer Neg Hx     Eclampsia Neg Hx      Social History     Tobacco Use    Smoking status: Current Every Day Smoker     Packs/day: 1.00     Years: 5.00     Pack years: 5.00    Smokeless tobacco: Never Used   Substance Use Topics    Alcohol use: Yes     Comment: socially    Drug use: No     Review of Systems   Constitutional: Negative for chills and fever.   HENT: Negative for congestion, rhinorrhea and sore throat.    Eyes: Negative.    Respiratory:  Negative for shortness of breath.    Cardiovascular: Negative for chest pain.   Gastrointestinal: Negative for abdominal pain, nausea and vomiting.   Endocrine: Negative.    Genitourinary: Negative for dysuria.   Musculoskeletal: Positive for arthralgias, gait problem and joint swelling. Negative for back pain, neck pain and neck stiffness.   Skin: Negative for rash.   Neurological: Negative for weakness and headaches.   Hematological: Does not bruise/bleed easily.   Psychiatric/Behavioral: Negative.    All other systems reviewed and are negative.      Physical Exam     Initial Vitals [12/18/19 2132]   BP Pulse Resp Temp SpO2   (!) 152/84 70 17 99.1 °F (37.3 °C) 98 %      MAP       --         Physical Exam    Nursing note and vitals reviewed.  Constitutional: She appears well-developed and well-nourished. She is not diaphoretic. No distress.   HENT:   Head: Normocephalic and atraumatic.   Eyes: Conjunctivae and EOM are normal. Pupils are equal, round, and reactive to light.   Neck: Normal range of motion. Neck supple. No tracheal deviation present.   Cardiovascular: Intact distal pulses.   1+ DP bilaterally   Pulmonary/Chest: No stridor. No respiratory distress.   Abdominal: There is no tenderness.   Musculoskeletal:   Limited range of motion of the right ankle secondary to discomfort.  There is tenderness to the lateral malleolus.  There is no erythema or warmth to the ankle, there is no open wound.  There is no obvious bony deformity.  No Achilles tenderness or defect.  There is dependent ecchymosis to the circumferential heel, also to the lateral dorsum of the foot.  Full motion of all toes.  Cap refill normal to all toes.  No proximal fibular tenderness.   Lymphadenopathy:     She has no cervical adenopathy.   Neurological: She is alert and oriented to person, place, and time.   Skin: Skin is warm and dry. Capillary refill takes less than 2 seconds.   Psychiatric: She has a normal mood and affect. Her behavior  is normal. Judgment and thought content normal.         ED Course   Procedures  Labs Reviewed   POCT URINE PREGNANCY          Imaging Results    None          Medical Decision Making:   Differential Diagnosis:   Fracture, contusion,sprain/strain, arthritis  ED Management:  There is relief with elevation, also with Ultram.  There is some pressure points that are her tender due to the splint; no ulceration; no evidence of infection.  I will place her in a walking boot.    Pt has 1+ DP bilaterally. Foot is warm. There is no significant edema or swelling to the foot or ankle. Pt states swelling improves with elevation. No evidence of compartment syndrome. No unilateral leg swelling. No evidence to suggest compartment syndrome at this time. I think unlikely DVT or VTE.     Prior to d/c, pain improved.                                 Clinical Impression:       ICD-10-CM ICD-9-CM   1. Right ankle pain, unspecified chronicity M25.571 719.47         Disposition:   Disposition: Discharged  Condition: Stable                               Nirmal Nuñez PA-C  12/19/19 0022

## 2019-12-19 NOTE — DISCHARGE INSTRUCTIONS
Continue RICE precautions.  Continue with Ibuprofen and Tylenol as needed for discomfort.  Ultram for severe or breakthrough pain.     Follow-up with Orthopedics on January 8th as planned.

## 2019-12-19 NOTE — ED TRIAGE NOTES
Pt was dx with closed ankle fracture to right ankle On Dec 11 th at this hospital. she reports continued pain. Patient arrives with splint in place. Pt reports having apt with Ortho on Jan 8th. No numbness or tingling on assessessment. Patient able to move toes, circulation was present. Bruising noted took ibuprofen and toradol today with minimal relief.

## 2020-01-03 ENCOUNTER — TELEPHONE (OUTPATIENT)
Dept: OBSTETRICS AND GYNECOLOGY | Facility: CLINIC | Age: 35
End: 2020-01-03

## 2020-01-03 NOTE — TELEPHONE ENCOUNTER
1/3/20 @ 1117  SPOKE WITH MS EMIR, SHE STATED THAT SHE NEEDS THE CERVICAL SCREENING RESUBMITTED TO MEDICAID BECAUSE MEDICAID NEVER DID RECEIVE IT . PT IS REQUESTING THAT RACIEL PLEASE CALL HER WHEN SHE GETS THIS MESSAGE     ----- Message from Valarie Olivarez sent at 1/3/2020 11:09 AM CST -----  Contact: Self  Type: Patient Call Back    Who called: Self  What is the request in detail: please put the Cervical screening through insurance. Raciel please contact the patient    Can the clinic reply by [facility]? No   Would the patient rather a call back or a response via [facility]? Call     Best call back number: [phone]

## 2020-10-09 ENCOUNTER — HOSPITAL ENCOUNTER (INPATIENT)
Facility: HOSPITAL | Age: 35
LOS: 3 days | Discharge: HOME | DRG: 872 | End: 2020-10-12
Attending: EMERGENCY MEDICINE | Admitting: HOSPITALIST
Payer: MEDICAID

## 2020-10-09 DIAGNOSIS — R07.9 CHEST PAIN: ICD-10-CM

## 2020-10-09 DIAGNOSIS — F19.10 POLYSUBSTANCE ABUSE: ICD-10-CM

## 2020-10-09 DIAGNOSIS — R65.10 SIRS (SYSTEMIC INFLAMMATORY RESPONSE SYNDROME): ICD-10-CM

## 2020-10-09 DIAGNOSIS — N12 PYELONEPHRITIS: ICD-10-CM

## 2020-10-09 DIAGNOSIS — A41.9 SEPSIS, DUE TO UNSPECIFIED ORGANISM, UNSPECIFIED WHETHER ACUTE ORGAN DYSFUNCTION PRESENT: Primary | ICD-10-CM

## 2020-10-09 DIAGNOSIS — N19 KIDNEY FAILURE: ICD-10-CM

## 2020-10-09 LAB
ALBUMIN SERPL BCP-MCNC: 3.8 G/DL (ref 3.5–5.2)
ALLENS TEST: ABNORMAL
ALP SERPL-CCNC: 83 U/L (ref 55–135)
ALT SERPL W/O P-5'-P-CCNC: 20 U/L (ref 10–44)
AMPHET+METHAMPHET UR QL: NORMAL
ANION GAP SERPL CALC-SCNC: 21 MMOL/L (ref 8–16)
AST SERPL-CCNC: 20 U/L (ref 10–40)
B-HCG UR QL: NEGATIVE
BACTERIA #/AREA URNS HPF: ABNORMAL /HPF
BARBITURATES UR QL SCN>200 NG/ML: NEGATIVE
BASOPHILS # BLD AUTO: ABNORMAL K/UL (ref 0–0.2)
BASOPHILS NFR BLD: 0 % (ref 0–1.9)
BENZODIAZ UR QL SCN>200 NG/ML: NEGATIVE
BILIRUB SERPL-MCNC: 1 MG/DL (ref 0.1–1)
BILIRUB UR QL STRIP: NEGATIVE
BUN SERPL-MCNC: 45 MG/DL (ref 6–20)
BZE UR QL SCN: NEGATIVE
CALCIUM SERPL-MCNC: 9.3 MG/DL (ref 8.7–10.5)
CANNABINOIDS UR QL SCN: NORMAL
CHLORIDE SERPL-SCNC: 91 MMOL/L (ref 95–110)
CHLORIDE UR-SCNC: 29 MMOL/L (ref 25–200)
CHLORIDE UR-SCNC: 29 MMOL/L (ref 25–200)
CK SERPL-CCNC: 25 U/L (ref 20–180)
CLARITY UR: ABNORMAL
CO2 SERPL-SCNC: 19 MMOL/L (ref 23–29)
COLOR UR: YELLOW
CREAT SERPL-MCNC: 4.2 MG/DL (ref 0.5–1.4)
CREAT UR-MCNC: 109.7 MG/DL (ref 15–325)
CREAT UR-MCNC: 54.2 MG/DL (ref 15–325)
CTP QC/QA: YES
CTP QC/QA: YES
DELSYS: ABNORMAL
DIFFERENTIAL METHOD: ABNORMAL
EOSINOPHIL # BLD AUTO: ABNORMAL K/UL (ref 0–0.5)
EOSINOPHIL NFR BLD: 0 % (ref 0–8)
ERYTHROCYTE [DISTWIDTH] IN BLOOD BY AUTOMATED COUNT: 13.4 % (ref 11.5–14.5)
EST. GFR  (AFRICAN AMERICAN): 15 ML/MIN/1.73 M^2
EST. GFR  (NON AFRICAN AMERICAN): 13 ML/MIN/1.73 M^2
ETHANOL SERPL-MCNC: <10 MG/DL
FIO2: 21
GLUCOSE SERPL-MCNC: 95 MG/DL (ref 70–110)
GLUCOSE UR QL STRIP: NEGATIVE
HCO3 UR-SCNC: 18.6 MMOL/L (ref 24–28)
HCT VFR BLD AUTO: 41.6 % (ref 37–48.5)
HGB BLD-MCNC: 14.2 G/DL (ref 12–16)
HGB UR QL STRIP: ABNORMAL
HYALINE CASTS #/AREA URNS LPF: 0 /LPF
IMM GRANULOCYTES # BLD AUTO: ABNORMAL K/UL (ref 0–0.04)
IMM GRANULOCYTES NFR BLD AUTO: ABNORMAL % (ref 0–0.5)
KETONES UR QL STRIP: NEGATIVE
LACTATE SERPL-SCNC: 1.2 MMOL/L (ref 0.5–2.2)
LACTATE SERPL-SCNC: 1.3 MMOL/L (ref 0.5–2.2)
LEUKOCYTE ESTERASE UR QL STRIP: ABNORMAL
LIPASE SERPL-CCNC: 10 U/L (ref 4–60)
LYMPHOCYTES # BLD AUTO: ABNORMAL K/UL (ref 1–4.8)
LYMPHOCYTES NFR BLD: 1 % (ref 18–48)
MCH RBC QN AUTO: 28.6 PG (ref 27–31)
MCHC RBC AUTO-ENTMCNC: 34.1 G/DL (ref 32–36)
MCV RBC AUTO: 84 FL (ref 82–98)
METHADONE UR QL SCN>300 NG/ML: NEGATIVE
MICROSCOPIC COMMENT: ABNORMAL
MODE: ABNORMAL
MONOCYTES # BLD AUTO: ABNORMAL K/UL (ref 0.3–1)
MONOCYTES NFR BLD: 4 % (ref 4–15)
NEUTROPHILS NFR BLD: 90 % (ref 38–73)
NEUTS BAND NFR BLD MANUAL: 5 %
NITRITE UR QL STRIP: NEGATIVE
NRBC BLD-RTO: 0 /100 WBC
OPIATES UR QL SCN: NEGATIVE
PCO2 BLDA: 33.9 MMHG (ref 35–45)
PCP UR QL SCN>25 NG/ML: NEGATIVE
PH SMN: 7.35 [PH] (ref 7.35–7.45)
PH UR STRIP: 5 [PH] (ref 5–8)
PLATELET # BLD AUTO: 232 K/UL (ref 150–350)
PMV BLD AUTO: 10.3 FL (ref 9.2–12.9)
PO2 BLDA: 16 MMHG (ref 40–60)
POC BE: -6 MMOL/L
POC SATURATED O2: 21 % (ref 95–100)
POC TCO2: 20 MMOL/L (ref 24–29)
POTASSIUM SERPL-SCNC: 3.7 MMOL/L (ref 3.5–5.1)
POTASSIUM UR-SCNC: 22 MMOL/L (ref 15–95)
PROT SERPL-MCNC: 8.1 G/DL (ref 6–8.4)
PROT UR QL STRIP: ABNORMAL
PROT UR-MCNC: 66 MG/DL
PROT/CREAT UR: 1.22 MG/G{CREAT} (ref 0–0.2)
PROT/CREAT UR: 1.22 MG/G{CREAT} (ref 0–0.2)
RBC # BLD AUTO: 4.96 M/UL (ref 4–5.4)
RBC #/AREA URNS HPF: 35 /HPF (ref 0–4)
SALICYLATES SERPL-MCNC: <5 MG/DL (ref 15–30)
SAMPLE: ABNORMAL
SARS-COV-2 RDRP RESP QL NAA+PROBE: NEGATIVE
SITE: ABNORMAL
SODIUM SERPL-SCNC: 131 MMOL/L (ref 136–145)
SODIUM UR-SCNC: 50 MMOL/L (ref 20–250)
SODIUM UR-SCNC: 50 MMOL/L (ref 20–250)
SP GR UR STRIP: 1.01 (ref 1–1.03)
SP02: 100
SQUAMOUS #/AREA URNS HPF: 5 /HPF
TOXICOLOGY INFORMATION: NORMAL
TROPONIN I SERPL DL<=0.01 NG/ML-MCNC: <0.006 NG/ML (ref 0–0.03)
TSH SERPL DL<=0.005 MIU/L-ACNC: 0.89 UIU/ML (ref 0.4–4)
URN SPEC COLLECT METH UR: ABNORMAL
UROBILINOGEN UR STRIP-ACNC: NEGATIVE EU/DL
WBC # BLD AUTO: 20.89 K/UL (ref 3.9–12.7)
WBC #/AREA URNS HPF: >100 /HPF (ref 0–5)

## 2020-10-09 PROCEDURE — 87205 SMEAR GRAM STAIN: CPT | Mod: 59

## 2020-10-09 PROCEDURE — 87088 URINE BACTERIA CULTURE: CPT

## 2020-10-09 PROCEDURE — 87186 SC STD MICRODIL/AGAR DIL: CPT

## 2020-10-09 PROCEDURE — S0028 INJECTION, FAMOTIDINE, 20 MG: HCPCS | Performed by: EMERGENCY MEDICINE

## 2020-10-09 PROCEDURE — 80307 DRUG TEST PRSMV CHEM ANLYZR: CPT

## 2020-10-09 PROCEDURE — 93010 EKG 12-LEAD: ICD-10-PCS | Mod: ,,, | Performed by: INTERNAL MEDICINE

## 2020-10-09 PROCEDURE — 81000 URINALYSIS NONAUTO W/SCOPE: CPT

## 2020-10-09 PROCEDURE — 82803 BLOOD GASES ANY COMBINATION: CPT

## 2020-10-09 PROCEDURE — 81025 URINE PREGNANCY TEST: CPT | Performed by: PHYSICIAN ASSISTANT

## 2020-10-09 PROCEDURE — 84133 ASSAY OF URINE POTASSIUM: CPT

## 2020-10-09 PROCEDURE — 84300 ASSAY OF URINE SODIUM: CPT

## 2020-10-09 PROCEDURE — 84540 ASSAY OF URINE/UREA-N: CPT

## 2020-10-09 PROCEDURE — 96375 TX/PRO/DX INJ NEW DRUG ADDON: CPT

## 2020-10-09 PROCEDURE — 84484 ASSAY OF TROPONIN QUANT: CPT

## 2020-10-09 PROCEDURE — 85025 COMPLETE CBC W/AUTO DIFF WBC: CPT

## 2020-10-09 PROCEDURE — 80053 COMPREHEN METABOLIC PANEL: CPT

## 2020-10-09 PROCEDURE — 96361 HYDRATE IV INFUSION ADD-ON: CPT

## 2020-10-09 PROCEDURE — 82550 ASSAY OF CK (CPK): CPT

## 2020-10-09 PROCEDURE — 99285 EMERGENCY DEPT VISIT HI MDM: CPT | Mod: 25

## 2020-10-09 PROCEDURE — 87077 CULTURE AEROBIC IDENTIFY: CPT

## 2020-10-09 PROCEDURE — 80183 DRUG SCRN QUANT OXCARBAZEPIN: CPT

## 2020-10-09 PROCEDURE — 96367 TX/PROPH/DG ADDL SEQ IV INF: CPT

## 2020-10-09 PROCEDURE — 63600175 PHARM REV CODE 636 W HCPCS: Performed by: EMERGENCY MEDICINE

## 2020-10-09 PROCEDURE — 84443 ASSAY THYROID STIM HORMONE: CPT

## 2020-10-09 PROCEDURE — 87040 BLOOD CULTURE FOR BACTERIA: CPT | Mod: 59

## 2020-10-09 PROCEDURE — 80320 DRUG SCREEN QUANTALCOHOLS: CPT

## 2020-10-09 PROCEDURE — 87205 SMEAR GRAM STAIN: CPT

## 2020-10-09 PROCEDURE — 93010 ELECTROCARDIOGRAM REPORT: CPT | Mod: ,,, | Performed by: INTERNAL MEDICINE

## 2020-10-09 PROCEDURE — 82570 ASSAY OF URINE CREATININE: CPT

## 2020-10-09 PROCEDURE — 96365 THER/PROPH/DIAG IV INF INIT: CPT

## 2020-10-09 PROCEDURE — U0002 COVID-19 LAB TEST NON-CDC: HCPCS | Performed by: EMERGENCY MEDICINE

## 2020-10-09 PROCEDURE — 83935 ASSAY OF URINE OSMOLALITY: CPT

## 2020-10-09 PROCEDURE — 25000003 PHARM REV CODE 250: Performed by: EMERGENCY MEDICINE

## 2020-10-09 PROCEDURE — 87086 URINE CULTURE/COLONY COUNT: CPT

## 2020-10-09 PROCEDURE — 83930 ASSAY OF BLOOD OSMOLALITY: CPT

## 2020-10-09 PROCEDURE — 25000003 PHARM REV CODE 250: Performed by: PHYSICIAN ASSISTANT

## 2020-10-09 PROCEDURE — 83605 ASSAY OF LACTIC ACID: CPT

## 2020-10-09 PROCEDURE — 12000002 HC ACUTE/MED SURGE SEMI-PRIVATE ROOM

## 2020-10-09 PROCEDURE — 80201 ASSAY OF TOPIRAMATE: CPT

## 2020-10-09 PROCEDURE — 82436 ASSAY OF URINE CHLORIDE: CPT

## 2020-10-09 PROCEDURE — 83690 ASSAY OF LIPASE: CPT

## 2020-10-09 PROCEDURE — 99900035 HC TECH TIME PER 15 MIN (STAT)

## 2020-10-09 PROCEDURE — 93005 ELECTROCARDIOGRAM TRACING: CPT

## 2020-10-09 RX ORDER — ONDANSETRON 2 MG/ML
4 INJECTION INTRAMUSCULAR; INTRAVENOUS
Status: ACTIVE | OUTPATIENT
Start: 2020-10-09 | End: 2020-10-10

## 2020-10-09 RX ORDER — SODIUM CHLORIDE 9 MG/ML
INJECTION, SOLUTION INTRAVENOUS CONTINUOUS
Status: DISCONTINUED | OUTPATIENT
Start: 2020-10-09 | End: 2020-10-10

## 2020-10-09 RX ORDER — DIAZEPAM 10 MG/2ML
2.5 INJECTION INTRAMUSCULAR
Status: COMPLETED | OUTPATIENT
Start: 2020-10-09 | End: 2020-10-09

## 2020-10-09 RX ORDER — HYDROMORPHONE HYDROCHLORIDE 2 MG/ML
1 INJECTION, SOLUTION INTRAMUSCULAR; INTRAVENOUS; SUBCUTANEOUS
Status: COMPLETED | OUTPATIENT
Start: 2020-10-09 | End: 2020-10-09

## 2020-10-09 RX ORDER — FAMOTIDINE 10 MG/ML
40 INJECTION INTRAVENOUS
Status: COMPLETED | OUTPATIENT
Start: 2020-10-09 | End: 2020-10-09

## 2020-10-09 RX ADMIN — SODIUM CHLORIDE 1000 ML: 0.9 INJECTION, SOLUTION INTRAVENOUS at 10:10

## 2020-10-09 RX ADMIN — SODIUM CHLORIDE 1000 ML: 0.9 INJECTION, SOLUTION INTRAVENOUS at 07:10

## 2020-10-09 RX ADMIN — PIPERACILLIN SODIUM AND TAZOBACTAM SODIUM 4.5 G: 4; .5 INJECTION, POWDER, LYOPHILIZED, FOR SOLUTION INTRAVENOUS at 07:10

## 2020-10-09 RX ADMIN — FAMOTIDINE 40 MG: 10 INJECTION INTRAVENOUS at 07:10

## 2020-10-09 RX ADMIN — HYDROMORPHONE HYDROCHLORIDE 1 MG: 2 INJECTION INTRAMUSCULAR; INTRAVENOUS; SUBCUTANEOUS at 07:10

## 2020-10-09 RX ADMIN — CEFTRIAXONE 2 G: 2 INJECTION, SOLUTION INTRAVENOUS at 10:10

## 2020-10-09 RX ADMIN — DIAZEPAM 2.5 MG: 5 INJECTION, SOLUTION INTRAMUSCULAR; INTRAVENOUS at 07:10

## 2020-10-09 NOTE — ED TRIAGE NOTES
Patient presents to the ER with chest, abdominal, and back pain since yesterday. Patient states nausea, diarrhea, and vomiting.

## 2020-10-09 NOTE — FIRST PROVIDER EVALUATION
Emergency Department TeleTriage Encounter Note      CHIEF COMPLAINT    Chief Complaint   Patient presents with    Chest Pain     Pt c/o chest pain, abdominal pain, N/V that started yesterday. Unable to hold down water. Mom reports it looks like pt went into a seizure earlier, denies pt hx but reports family hx of epilepsy. Pain is 9/10. Pt took Zofran PTA    Abdominal Pain       VITAL SIGNS   Initial Vitals [10/09/20 1625]   BP Pulse Resp Temp SpO2   117/85 (!) 141 (!) 24 99.5 °F (37.5 °C) 98 %      MAP       --            ALLERGIES    Review of patient's allergies indicates:  No Known Allergies    PROVIDER TRIAGE NOTE  35-year-old female presents to the ED for evaluation of chest and abdominal discomfort that started yesterday.  She reports multiple episodes of nausea vomiting.  Denies any fever.  She reports sore throat and headache.  No diarrhea.    Initial orders will be placed and care will be transferred to an alternate provider when patient is roomed for a full evaluation. Any additional orders and the final disposition will be determined by that provider.      ORDERS  Labs Reviewed   CBC W/ AUTO DIFFERENTIAL   COMPREHENSIVE METABOLIC PANEL   LIPASE   URINALYSIS, REFLEX TO URINE CULTURE   TROPONIN I   POCT URINE PREGNANCY       ED Orders (720h ago, onward)    Start Ordered     Status Ordering Provider    10/09/20 1629 10/09/20 1628  POCT urine pregnancy  Once      Ordered CHAO HERRON    10/09/20 1629 10/09/20 1628  Troponin I  STAT  Collect    Ordered CHAO HERRON    10/09/20 1627 10/09/20 1626  Saline lock IV  Once      Ordered CHAO HERRON    10/09/20 1627 10/09/20 1626  CBC auto differential  STAT  Collect    Ordered CHAO HERRON    10/09/20 1627 10/09/20 1626  Comprehensive metabolic panel  STAT  Collect    Ordered CHAO HERRON    10/09/20 1627 10/09/20 1626  EKG 12-lead  Once      Ordered CHAO HERRON    10/09/20 1627 10/09/20 1626  Lipase  STAT  Collect    Ordered  CHAO HERRON.    10/09/20 1627 10/09/20 1626  Urinalysis, Reflex to Urine Culture Urine, Clean Catch  STAT      Ordered CHAO HERRON.    10/09/20 1627 10/09/20 1626  X-Ray Chest 1 View  1 time imaging      In process CHAO HERRON            Virtual Visit Note: The provider triage portion of this emergency department evaluation and documentation was performed via [employer], a HIPAA-compliant telemedicine application, in concert with a tele-presenter in the room. A face to face patient evaluation with one of my colleagues will occur once the patient is placed in an emergency department room.      DISCLAIMER: This note was prepared with [employer] voice recognition transcription software. Garbled syntax, mangled pronouns, and other bizarre constructions may be attributed to that software system.

## 2020-10-09 NOTE — ED PROVIDER NOTES
"Encounter Date: 10/9/2020       History     Chief Complaint   Patient presents with    Chest Pain     Pt c/o chest pain, abdominal pain, N/V that started yesterday. Unable to hold down water. Mom reports it looks like pt went into a seizure earlier, denies pt hx but reports family hx of epilepsy. Pain is 9/10. Pt took Zofran PTA    Abdominal Pain     35 y.o. female Past Medical History:  No date: Anxiety  No date: Asthma      Comment:  as a child, no recent episodes  No date: GERD (gastroesophageal reflux disease)  No date: Mental disorder  No date: Migraine headache  No date: Smoker  No date: Umbilical hernia  No date: Vaginal delivery      Comment:  x4     Presents for evaluation of R flank pain rad to abd coupled with intractable vomiting for the last 2-3 days. Pt denies starting new meds/running out of meds but had stopped taking all her meds 3 weeks ago and just restarted taking them.  She endorses +nasal congestion, change in smell, post nasal drip, sore throat, non prod cough, intractable nbnb  Vomiting, Rflank pain, body aches, abd pain, subj fevers.    Pts mother notes an episode where she c/o pain and then "her eyes rolled back" for a short period. No post ictal phase, no seizure activity.        Review of patient's allergies indicates:  No Known Allergies  Past Medical History:   Diagnosis Date    Anxiety     Asthma     as a child, no recent episodes    GERD (gastroesophageal reflux disease)     Mental disorder     Methamphetamine abuse     Migraine headache     Smoker     Suicidal ideation     Umbilical hernia     Vaginal delivery     x4     Past Surgical History:   Procedure Laterality Date    DILATION AND CURETTAGE OF UTERUS      HYSTEROSCOPY      TUBAL LIGATION      VAGINAL DELIVERY      x 4     Family History   Problem Relation Age of Onset    Cancer Neg Hx     Eclampsia Neg Hx      Social History     Tobacco Use    Smoking status: Current Every Day Smoker     Packs/day: 1.00     " Years: 5.00     Pack years: 5.00    Smokeless tobacco: Never Used   Substance Use Topics    Alcohol use: Yes     Comment: socially    Drug use: No     Review of Systems   Constitutional: Negative for fever.   HENT: Negative for sore throat.    Respiratory: Negative for shortness of breath.    Cardiovascular: Negative for chest pain.   Gastrointestinal: Negative for nausea.   Genitourinary: Negative for dysuria.   Musculoskeletal: Negative for back pain.   Skin: Negative for rash.   Neurological: Negative for weakness.   Hematological: Does not bruise/bleed easily.   All other systems reviewed and are negative.      Physical Exam     Initial Vitals [10/09/20 1625]   BP Pulse Resp Temp SpO2   117/85 (!) 141 (!) 24 99.5 °F (37.5 °C) 98 %      MAP       --         Physical Exam    Nursing note and vitals reviewed.  Constitutional: She appears well-developed and well-nourished.   HENT:   Head: Normocephalic and atraumatic.   Eyes: Conjunctivae and EOM are normal. Pupils are equal, round, and reactive to light.   Neck: Normal range of motion.   Cardiovascular: Normal rate.   Pulmonary/Chest: No respiratory distress.   Abdominal: She exhibits no distension.   Musculoskeletal: Normal range of motion.   Neurological: She is alert. No cranial nerve deficit. GCS score is 15. GCS eye subscore is 4. GCS verbal subscore is 5. GCS motor subscore is 6.   Skin: Skin is warm and dry.   Psychiatric: She has a normal mood and affect. Thought content normal.     +ttp throughout abd without focality  Pt thrashing around in bed/sobbing  Exam very limited by patient behavior    ED Course   Procedures  Labs Reviewed   CBC W/ AUTO DIFFERENTIAL - Abnormal; Notable for the following components:       Result Value    WBC 20.89 (*)     Gran% 90.0 (*)     Lymph% 1.0 (*)     All other components within normal limits   COMPREHENSIVE METABOLIC PANEL - Abnormal; Notable for the following components:    Sodium 131 (*)     Chloride 91 (*)     CO2 19  (*)     BUN, Bld 45 (*)     Creatinine 4.2 (*)     Anion Gap 21 (*)     eGFR if  15 (*)     eGFR if non  13 (*)     All other components within normal limits   URINALYSIS, REFLEX TO URINE CULTURE - Abnormal; Notable for the following components:    Appearance, UA Cloudy (*)     Protein, UA 3+ (*)     Occult Blood UA 2+ (*)     Leukocytes, UA 3+ (*)     All other components within normal limits    Narrative:     Specimen Source->Urine   URINALYSIS MICROSCOPIC - Abnormal; Notable for the following components:    RBC, UA 35 (*)     WBC, UA >100 (*)     Bacteria Moderate (*)     All other components within normal limits    Narrative:     Specimen Source->Urine   ISTAT PROCEDURE - Abnormal; Notable for the following components:    POC PH 7.347 (*)     POC PCO2 33.9 (*)     POC PO2 16 (*)     POC HCO3 18.6 (*)     POC SATURATED O2 21 (*)     POC TCO2 20 (*)     All other components within normal limits   CULTURE, URINE   CULTURE, BLOOD   CULTURE, BLOOD   GRAM STAIN   LIPASE   TROPONIN I   LACTIC ACID, PLASMA   LACTIC ACID, PLASMA   ALCOHOL,MEDICAL (ETHANOL)   TSH   DRUG SCREEN PANEL, URINE EMERGENCY   DRUG SCREEN PANEL, URINE EMERGENCY    Narrative:     Specimen Source->Urine   CK   OXCARBAZEPINE METABOLITE (MHC)   TOPIRAMATE LEVEL   CREATININE, URINE, RANDOM   PROTEIN / CREATININE RATIO, URINE   PROTEIN, QUANTITATIVE, URINE RANDOM   CHLORIDE, URINE, RANDOM   SODIUM, URINE, RANDOM   SALICYLATE LEVEL   OSMOLALITY, SERUM   PROTEIN / CREATININE RATIO, URINE   POTASSIUM, URINE, RANDOM   CHLORIDE, URINE, RANDOM   SODIUM, URINE, RANDOM   UREA NITROGEN, URINE, RANDOM   CREATININE, URINE, RANDOM   HARRY'S STAIN, URINE RANDOM   OSMOLALITY, SERUM   POCT URINE PREGNANCY   SARS-COV-2 RDRP GENE          Imaging Results          CT Renal Stone Study ABD Pelvis WO (Final result)  Result time 10/09/20 19:34:03    Final result by Shay Mercado MD (10/09/20 19:34:03)                 Impression:      1.  No acute intra-abdominal abnormalities identified.  No evidence of stones or obstructive uropathy.  2. Small atrophic right kidney.      Electronically signed by: Shay Mercado MD  Date:    10/09/2020  Time:    19:34             Narrative:    EXAMINATION:  CT RENAL STONE STUDY ABD PELVIS WO    CLINICAL HISTORY:  Flank pain, kidney stone suspected;    TECHNIQUE:  Low dose axial images, sagittal and coronal reformations were obtained from the lung bases to the pubic symphysis.  Contrast was not administered.    COMPARISON:  None    FINDINGS:  The visualized portion of the heart is unremarkable.  The lung bases are clear.    No significant hepatic abnormality seen on this noncontrast exam.  Liver is mildly enlarged measuring 19 cm.  There is no intra-or extrahepatic biliary ductal dilatation.  The gallbladder is unremarkable.  The stomach, pancreas, spleen, and adrenal glands are unremarkable.    Right kidney is small and atrophic.  Left kidney is normal in size and shows no stones or hydronephrosis.  Ureters are difficult to track.  Urinary bladder is nondistended.  Uterus is retroverted.  No significant adnexal abnormalities are seen.    Appendix is visualized and is unremarkable.  The visualized loops of small and large bowel show no evidence of obstruction or inflammation.  No free air or free fluid.    Aorta tapers normally.    No acute osseous abnormality identified.  L2 limbus vertebral body is noted.  Subcutaneous soft tissue structures are unremarkable.                               X-Ray Chest 1 View (Final result)  Result time 10/09/20 17:17:43    Final result by Shay Mercado MD (10/09/20 17:17:43)                 Impression:      No acute cardiopulmonary process identified.      Electronically signed by: Shay Mercado MD  Date:    10/09/2020  Time:    17:17             Narrative:    EXAMINATION:  XR CHEST 1 VIEW    CLINICAL HISTORY:  Chest pain, unspecified    TECHNIQUE:  Single frontal view of the  chest was performed.    COMPARISON:  None    FINDINGS:  Cardiac silhouette is normal in size.  Lungs are symmetrically expanded.  No evidence of focal consolidative process, pneumothorax, or significant pleural effusion.  No acute osseous abnormality identified.                                              Attending Attestation:         Attending Critical Care:   Critical Care Times:   Direct Patient Care (initial evaluation, reassessments, and time considering the case)................................................................70 minutes.   Additional History from reviewing old medical records or taking additional history from the family, EMS, PCP, etc.......................10 minutes.   Ordering, Reviewing, and Interpreting Diagnostic Studies...............................................................................................................10 minutes.   Documentation..................................................................................................................................................................................10 minutes.   Consultation with other Physicians. .................................................................................................................................................15 minutes.   ==============================================================  · Total Critical Care Time - exclusive of procedural time: 115 minutes.  ==============================================================                given pt is on benzo and has not tolerated pos in days some component of withdrawal may be contributing. Have written for low dose iv valium, screening labs, ivf, ua, ct abd/pelvis.     unclear why pt is in acute renal failure. UA c/w pyelo, I have ordered CT to exclude renal stone.     Pt initially denied drugs but then admitted to RN that she took 6 tabs of ecstasy over the weekend. ? Urinary retention from that    Ck pending. I have a call  nephrology. I have ordered picc line as pts bp has been soft, but fluid responsive. I have given ceftriaxone/zosyn.     I have consulted Dr. Cely Herrera who is aware of pt and agrees with ivf, monitor kidney function.  Clinical Impression:       ICD-10-CM ICD-9-CM   1. Sepsis, due to unspecified organism, unspecified whether acute organ dysfunction present  A41.9 038.9     995.91   2. Chest pain  R07.9 786.50   3. Kidney failure  N19 586   4. Pyelonephritis  N12 590.80   5. SIRS (systemic inflammatory response syndrome)  R65.10 995.90   6. Polysubstance abuse  F19.10 305.90                          ED Disposition Condition    Admit                             Paul Roman MD  10/09/20 4534       Paul Roman MD  10/09/20 2256

## 2020-10-10 LAB
ALBUMIN SERPL BCP-MCNC: 2.5 G/DL (ref 3.5–5.2)
ALP SERPL-CCNC: 58 U/L (ref 55–135)
ALT SERPL W/O P-5'-P-CCNC: 14 U/L (ref 10–44)
ANION GAP SERPL CALC-SCNC: 13 MMOL/L (ref 8–16)
AST SERPL-CCNC: 15 U/L (ref 10–40)
BASOPHILS # BLD AUTO: 0.03 K/UL (ref 0–0.2)
BASOPHILS NFR BLD: 0.2 % (ref 0–1.9)
BILIRUB SERPL-MCNC: 0.6 MG/DL (ref 0.1–1)
BUN SERPL-MCNC: 35 MG/DL (ref 6–20)
CALCIUM SERPL-MCNC: 7.6 MG/DL (ref 8.7–10.5)
CHLORIDE SERPL-SCNC: 105 MMOL/L (ref 95–110)
CO2 SERPL-SCNC: 17 MMOL/L (ref 23–29)
CREAT SERPL-MCNC: 2.6 MG/DL (ref 0.5–1.4)
CREAT UR-MCNC: 49.1 MG/DL (ref 15–325)
DIFFERENTIAL METHOD: ABNORMAL
EOSINOPHIL # BLD AUTO: 0.1 K/UL (ref 0–0.5)
EOSINOPHIL NFR BLD: 1.1 % (ref 0–8)
EOSINOPHIL URNS QL WRIGHT STN: NORMAL
ERYTHROCYTE [DISTWIDTH] IN BLOOD BY AUTOMATED COUNT: 13.5 % (ref 11.5–14.5)
EST. GFR  (AFRICAN AMERICAN): 27 ML/MIN/1.73 M^2
EST. GFR  (NON AFRICAN AMERICAN): 23 ML/MIN/1.73 M^2
GLUCOSE SERPL-MCNC: 89 MG/DL (ref 70–110)
GRAM STN SPEC: NORMAL
HCT VFR BLD AUTO: 29 % (ref 37–48.5)
HGB BLD-MCNC: 9.8 G/DL (ref 12–16)
HIV1+2 IGG SERPL QL IA.RAPID: NORMAL
IMM GRANULOCYTES # BLD AUTO: 0.18 K/UL (ref 0–0.04)
IMM GRANULOCYTES NFR BLD AUTO: 1.5 % (ref 0–0.5)
LYMPHOCYTES # BLD AUTO: 0.8 K/UL (ref 1–4.8)
LYMPHOCYTES NFR BLD: 6.3 % (ref 18–48)
MAGNESIUM SERPL-MCNC: 1.8 MG/DL (ref 1.6–2.6)
MCH RBC QN AUTO: 29 PG (ref 27–31)
MCHC RBC AUTO-ENTMCNC: 33.8 G/DL (ref 32–36)
MCV RBC AUTO: 86 FL (ref 82–98)
MONOCYTES # BLD AUTO: 0.8 K/UL (ref 0.3–1)
MONOCYTES NFR BLD: 6.4 % (ref 4–15)
NEUTROPHILS # BLD AUTO: 10.4 K/UL (ref 1.8–7.7)
NEUTROPHILS NFR BLD: 84.5 % (ref 38–73)
NRBC BLD-RTO: 0 /100 WBC
OSMOLALITY SERPL: 282 MOSM/KG (ref 275–295)
OSMOLALITY UR: 243 MOSM/KG (ref 50–1200)
PHOSPHATE SERPL-MCNC: 5 MG/DL (ref 2.7–4.5)
PLATELET # BLD AUTO: 130 K/UL (ref 150–350)
PLATELET BLD QL SMEAR: ABNORMAL
PMV BLD AUTO: 10.7 FL (ref 9.2–12.9)
POTASSIUM SERPL-SCNC: 3.6 MMOL/L (ref 3.5–5.1)
PROT SERPL-MCNC: 5.5 G/DL (ref 6–8.4)
RBC # BLD AUTO: 3.38 M/UL (ref 4–5.4)
SODIUM SERPL-SCNC: 135 MMOL/L (ref 136–145)
SODIUM UR-SCNC: 46 MMOL/L (ref 20–250)
UUN UR-MCNC: 259 MG/DL (ref 140–1050)
WBC # BLD AUTO: 12.28 K/UL (ref 3.9–12.7)

## 2020-10-10 PROCEDURE — 99900035 HC TECH TIME PER 15 MIN (STAT)

## 2020-10-10 PROCEDURE — 21400001 HC TELEMETRY ROOM

## 2020-10-10 PROCEDURE — 84100 ASSAY OF PHOSPHORUS: CPT

## 2020-10-10 PROCEDURE — 80053 COMPREHEN METABOLIC PANEL: CPT

## 2020-10-10 PROCEDURE — 85025 COMPLETE CBC W/AUTO DIFF WBC: CPT

## 2020-10-10 PROCEDURE — 86703 HIV-1/HIV-2 1 RESULT ANTBDY: CPT

## 2020-10-10 PROCEDURE — 82570 ASSAY OF URINE CREATININE: CPT

## 2020-10-10 PROCEDURE — 36569 INSJ PICC 5 YR+ W/O IMAGING: CPT

## 2020-10-10 PROCEDURE — 25000003 PHARM REV CODE 250: Performed by: HOSPITALIST

## 2020-10-10 PROCEDURE — C1751 CATH, INF, PER/CENT/MIDLINE: HCPCS

## 2020-10-10 PROCEDURE — 80074 ACUTE HEPATITIS PANEL: CPT

## 2020-10-10 PROCEDURE — 84300 ASSAY OF URINE SODIUM: CPT

## 2020-10-10 PROCEDURE — A4216 STERILE WATER/SALINE, 10 ML: HCPCS | Performed by: HOSPITALIST

## 2020-10-10 PROCEDURE — 94799 UNLISTED PULMONARY SVC/PX: CPT

## 2020-10-10 PROCEDURE — 63600175 PHARM REV CODE 636 W HCPCS: Performed by: HOSPITALIST

## 2020-10-10 PROCEDURE — 83735 ASSAY OF MAGNESIUM: CPT

## 2020-10-10 PROCEDURE — 94761 N-INVAS EAR/PLS OXIMETRY MLT: CPT

## 2020-10-10 RX ORDER — BISACODYL 10 MG
10 SUPPOSITORY, RECTAL RECTAL DAILY PRN
Status: DISCONTINUED | OUTPATIENT
Start: 2020-10-10 | End: 2020-10-12 | Stop reason: HOSPADM

## 2020-10-10 RX ORDER — HEPARIN SODIUM 5000 [USP'U]/ML
5000 INJECTION, SOLUTION INTRAVENOUS; SUBCUTANEOUS EVERY 8 HOURS
Status: DISCONTINUED | OUTPATIENT
Start: 2020-10-10 | End: 2020-10-12 | Stop reason: HOSPADM

## 2020-10-10 RX ORDER — MORPHINE SULFATE 4 MG/ML
5 INJECTION, SOLUTION INTRAMUSCULAR; INTRAVENOUS EVERY 4 HOURS PRN
Status: DISCONTINUED | OUTPATIENT
Start: 2020-10-10 | End: 2020-10-12 | Stop reason: HOSPADM

## 2020-10-10 RX ORDER — MUPIROCIN 20 MG/G
OINTMENT TOPICAL 2 TIMES DAILY
Status: DISCONTINUED | OUTPATIENT
Start: 2020-10-10 | End: 2020-10-12 | Stop reason: HOSPADM

## 2020-10-10 RX ORDER — POLYETHYLENE GLYCOL 3350 17 G/17G
17 POWDER, FOR SOLUTION ORAL DAILY PRN
Status: DISCONTINUED | OUTPATIENT
Start: 2020-10-10 | End: 2020-10-12 | Stop reason: HOSPADM

## 2020-10-10 RX ORDER — TALC
9 POWDER (GRAM) TOPICAL NIGHTLY PRN
Status: DISCONTINUED | OUTPATIENT
Start: 2020-10-10 | End: 2020-10-12 | Stop reason: HOSPADM

## 2020-10-10 RX ORDER — DEXTROMETHORPHAN POLISTIREX 30 MG/5 ML
1 SUSPENSION, EXTENDED RELEASE 12 HR ORAL DAILY PRN
Status: DISCONTINUED | OUTPATIENT
Start: 2020-10-10 | End: 2020-10-12 | Stop reason: HOSPADM

## 2020-10-10 RX ORDER — ACETAMINOPHEN 500 MG
1000 TABLET ORAL ONCE
Status: COMPLETED | OUTPATIENT
Start: 2020-10-10 | End: 2020-10-10

## 2020-10-10 RX ORDER — ACETAMINOPHEN 325 MG/1
650 TABLET ORAL EVERY 8 HOURS PRN
Status: DISCONTINUED | OUTPATIENT
Start: 2020-10-10 | End: 2020-10-12 | Stop reason: HOSPADM

## 2020-10-10 RX ORDER — HYDROCODONE BITARTRATE AND ACETAMINOPHEN 5; 325 MG/1; MG/1
1 TABLET ORAL EVERY 4 HOURS PRN
Status: DISCONTINUED | OUTPATIENT
Start: 2020-10-10 | End: 2020-10-12 | Stop reason: HOSPADM

## 2020-10-10 RX ORDER — SODIUM CHLORIDE 0.9 % (FLUSH) 0.9 %
10 SYRINGE (ML) INJECTION EVERY 6 HOURS
Status: DISCONTINUED | OUTPATIENT
Start: 2020-10-10 | End: 2020-10-12 | Stop reason: HOSPADM

## 2020-10-10 RX ORDER — ONDANSETRON 2 MG/ML
8 INJECTION INTRAMUSCULAR; INTRAVENOUS EVERY 8 HOURS PRN
Status: DISCONTINUED | OUTPATIENT
Start: 2020-10-10 | End: 2020-10-12 | Stop reason: HOSPADM

## 2020-10-10 RX ORDER — ACETAMINOPHEN 500 MG
500 TABLET ORAL ONCE
Status: DISCONTINUED | OUTPATIENT
Start: 2020-10-10 | End: 2020-10-10

## 2020-10-10 RX ORDER — PROMETHAZINE HYDROCHLORIDE 25 MG/1
25 SUPPOSITORY RECTAL EVERY 6 HOURS PRN
Status: DISCONTINUED | OUTPATIENT
Start: 2020-10-10 | End: 2020-10-12 | Stop reason: HOSPADM

## 2020-10-10 RX ORDER — SODIUM CHLORIDE 0.9 % (FLUSH) 0.9 %
10 SYRINGE (ML) INJECTION
Status: DISCONTINUED | OUTPATIENT
Start: 2020-10-10 | End: 2020-10-12 | Stop reason: HOSPADM

## 2020-10-10 RX ORDER — AMOXICILLIN 250 MG
1 CAPSULE ORAL DAILY
Status: DISCONTINUED | OUTPATIENT
Start: 2020-10-10 | End: 2020-10-12 | Stop reason: HOSPADM

## 2020-10-10 RX ADMIN — SODIUM BICARBONATE: 84 INJECTION, SOLUTION INTRAVENOUS at 11:10

## 2020-10-10 RX ADMIN — Medication 10 ML: at 06:10

## 2020-10-10 RX ADMIN — ACETAMINOPHEN 650 MG: 325 TABLET ORAL at 08:10

## 2020-10-10 RX ADMIN — ACETAMINOPHEN 650 MG: 325 TABLET ORAL at 05:10

## 2020-10-10 RX ADMIN — BUSPIRONE HYDROCHLORIDE 15 MG: 10 TABLET ORAL at 08:10

## 2020-10-10 RX ADMIN — BUSPIRONE HYDROCHLORIDE 15 MG: 10 TABLET ORAL at 02:10

## 2020-10-10 RX ADMIN — HEPARIN SODIUM 5000 UNITS: 5000 INJECTION INTRAVENOUS; SUBCUTANEOUS at 10:10

## 2020-10-10 RX ADMIN — Medication 10 ML: at 12:10

## 2020-10-10 RX ADMIN — ACETAMINOPHEN 1000 MG: 500 TABLET ORAL at 01:10

## 2020-10-10 RX ADMIN — DOCUSATE SODIUM 50 MG AND SENNOSIDES 8.6 MG 1 TABLET: 8.6; 5 TABLET, FILM COATED ORAL at 08:10

## 2020-10-10 RX ADMIN — CEFTRIAXONE 1 G: 1 INJECTION, SOLUTION INTRAVENOUS at 05:10

## 2020-10-10 RX ADMIN — MUPIROCIN: 20 OINTMENT TOPICAL at 08:10

## 2020-10-10 RX ADMIN — SODIUM CHLORIDE: 0.9 INJECTION, SOLUTION INTRAVENOUS at 01:10

## 2020-10-10 NOTE — PROCEDURES
"Karen Mccall is a 35 y.o. female patient.    Temp: 100.3 °F (37.9 °C) (10/09/20 2300)  Pulse: 106 (10/09/20 2301)  Resp: (!) 24 (10/09/20 2301)  BP: 106/65 (10/09/20 2301)  SpO2: 100 % (10/09/20 2301)  Weight: 61.2 kg (135 lb) (10/09/20 1625)  Height: 5' 3" (160 cm) (10/09/20 1625)    PICC  Date/Time: 10/10/2020 12:35 AM  Performed by: Dawood Miguel RN  Consent Done: Yes  Time out: Immediately prior to procedure a time out was called to verify the correct patient, procedure, equipment, support staff and site/side marked as required  Indications: med administration  Anesthesia: local infiltration  Local anesthetic: lidocaine 1% without epinephrine  Anesthetic Total (mL): 1  Preparation: skin prepped with ChloraPrep  Skin prep agent dried: skin prep agent completely dried prior to procedure  Sterile barriers: all five maximum sterile barriers used - cap, mask, sterile gown, sterile gloves, and large sterile sheet  Hand hygiene: hand hygiene performed prior to central venous catheter insertion  Location details: right basilic  Catheter type: double lumen  Catheter size: 5 Fr  Catheter Length: 34cm    Ultrasound guidance: yes  Vessel Caliber: large and patent, compressibility normal  Vascular Doppler: not done  Needle advanced into vessel with real time Ultrasound guidance.  Guidewire confirmed in vessel.  Sterile sheath used.  Number of attempts: 1  Post-procedure: blood return through all ports, chlorhexidine patch and sterile dressing applied  Estimated blood loss (mL): 0            Dawood Miguel  10/10/2020  "

## 2020-10-10 NOTE — RESPIRATORY THERAPY
Results reported to Dr Roman.     Results for BRIAN FIELD (MRN [id number]) as of 10/9/2020 23:33   Ref. Range 10/9/2020 22:09   POC PH Latest Ref Range: 7.35 - 7.45  7.347 (L)   POC PCO2 Latest Ref Range: 35 - 45 mmHg 33.9 (L)   POC PO2 Latest Ref Range: 40 - 60 mmHg 16 (LL)   POC BE Latest Ref Range: -2 to 2 mmol/L -6   POC HCO3 Latest Ref Range: 24 - 28 mmol/L 18.6 (L)   POC SATURATED O2 Latest Ref Range: 95 - 100 % 21 (L)   POC TCO2 Latest Ref Range: 24 - 29 mmol/L 20 (L)   FiO2 Unknown 21   Sample Unknown VENOUS   DelSys Unknown Room Air   Allens Test Unknown N/A   Site Unknown Other   Mode Unknown SPONT

## 2020-10-10 NOTE — CONSULTS
Reason for consultation:  Acute renal failure    HPI:  36 yo  lady with h/o mathamphetamine abuse, migraine headache, asthma, mental disorder presented to the hospital with c/o chest pain, abdominal pain, N/V.  She is admitted for pyelonephritis, sepsis, acute renal failure.  Nephrology is consulted for evaluation.  She reports still having chest pain, feeling weak and having pain in the back.    PMH:  As above    Scheduled Meds:   cefTRIAXone (ROCEPHIN) IVPB  1 g Intravenous Q12H    heparin (porcine)  5,000 Units Subcutaneous Q8H    mupirocin   Nasal BID    senna-docusate 8.6-50 mg  1 tablet Oral Daily    sodium chloride 0.9%  10 mL Intravenous Q6H       Review of patient's allergies indicates:  No Known Allergies    Family history:  Non contributory    Social history:  (+) tobacco, (+) methamphetamine, (+) marijuana, (+) alcohol    Vital Signs Range (Last 24H):  Temp:  [98 °F (36.7 °C)-100.3 °F (37.9 °C)]   Pulse:  []   Resp:  [16-24]   BP: ()/(50-85)   SpO2:  [96 %-100 %]     I & O (Last 24H):    Intake/Output Summary (Last 24 hours) at 10/10/2020 0841  Last data filed at 10/10/2020 0700  Gross per 24 hour   Intake 3806.25 ml   Output 925 ml   Net 2881.25 ml           Physical Exam:  General appearance: well developed, well nourished, mild distress  Lungs:  clear to auscultation bilaterally and normal respiratory effort  Heart: regular rate and rhythm  Abdomen: mild tenderness, (+) bowel sounds  Extremities: no cyanosis or edema, or clubbing    Laboratory:  CBC:   Recent Labs   Lab 10/10/20  0708   WBC 12.28   RBC 3.38*   HGB 9.8*   HCT 29.0*   *   MCV 86   MCH 29.0   MCHC 33.8     CMP:   Recent Labs   Lab 10/10/20  0708   GLU 89   CALCIUM 7.6*   ALBUMIN 2.5*   PROT 5.5*   *   K 3.6   CO2 17*      BUN 35*   CREATININE 2.6*   ALKPHOS 58   ALT 14   AST 15   BILITOT 0.6     Recent Labs   Lab 10/09/20  1705   COLORU Yellow   SPECGRAV 1.015   PHUR 5.0   PROTEINUA 3+*    BACTERIA Moderate*   NITRITE Negative   LEUKOCYTESUR 3+*   UROBILINOGEN Negative   HYALINECASTS 0       Impression:    FRANCISCA - prerenal azeotemia, ATN due to infection  Pyelonephritis  Sepsis  Polysubstance abuse    Discussion/Recommendations:    Continue IVF and Abx  Supportive care  Follow up on urine and blood cultures  We'll follow    Thank you for the courtesy of the consultation       Cely Herrera  10/10/2020

## 2020-10-10 NOTE — NURSING
New admit report received from KARTHIKEYAN Bailey RN @0045. Room prepared and telemetry box placed in room awaiting patient arrival.    Update: Patient arrived to the unit via stretcher with transport.  Telemetry monitor placed. VS and H2T exam completed and recorded in Epic. Admissions navigator completed. NAD noted. Fall/Safety precautions maintained. Call light and personal items within reach. Communication board updated.

## 2020-10-10 NOTE — PLAN OF CARE
Problem: Adult Inpatient Plan of Care  Goal: Plan of Care Review  Outcome: Ongoing, Progressing  Goal: Patient-Specific Goal (Individualization)  Outcome: Ongoing, Progressing  Goal: Absence of Hospital-Acquired Illness or Injury  Outcome: Ongoing, Progressing  Intervention: Identify and Manage Fall Risk  Flowsheets (Taken 10/10/2020 1756)  Safety Promotion/Fall Prevention:   assistive device/personal item within reach   side rails raised x 2  Intervention: Prevent VTE (venous thromboembolism)  Flowsheets (Taken 10/10/2020 1756)  VTE Prevention/Management:   intravenous hydration   ambulation promoted   ROM (active) performed  Goal: Optimal Comfort and Wellbeing  Outcome: Ongoing, Progressing  Intervention: Provide Person-Centered Care  Flowsheets (Taken 10/10/2020 1756)  Trust Relationship/Rapport:   care explained   questions answered   empathic listening provided  Goal: Readiness for Transition of Care  Outcome: Ongoing, Progressing  Goal: Rounds/Family Conference  Outcome: Ongoing, Progressing     Problem: Adjustment to Illness (Sepsis/Septic Shock)  Goal: Optimal Coping  Outcome: Ongoing, Progressing     Problem: Infection (Sepsis/Septic Shock)  Goal: Absence of Infection Signs/Symptoms  Outcome: Ongoing, Progressing     Problem: Nutrition Impaired (Sepsis/Septic Shock)  Goal: Optimal Nutrition Intake  Outcome: Ongoing, Progressing     Problem: Infection  Goal: Infection Symptom Resolution  Outcome: Ongoing, Progressing  Intervention: Prevent or Manage Infection  Flowsheets (Taken 10/10/2020 1756)  Fever Reduction/Comfort Measures: medication administered     Problem: Electrolyte Imbalance (Acute Kidney Injury/Impairment)  Goal: Serum Electrolyte Balance  Outcome: Ongoing, Progressing     Problem: Fall Injury Risk  Goal: Absence of Fall and Fall-Related Injury  Outcome: Ongoing, Progressing  Intervention: Identify and Manage Contributors to Fall Injury Risk  Flowsheets (Taken 10/10/2020 1756)  Self-Care  Promotion:   independence encouraged   BADL personal objects within reach   meal setup provided  Medication Review/Management: medications reviewed

## 2020-10-10 NOTE — PLAN OF CARE
10/10/20 1513   Discharge Assessment   Assessment Type Discharge Planning Assessment   Confirmed/corrected address and phone number on facesheet? Yes   Assessment information obtained from? Patient;Medical Record   Communicated expected length of stay with patient/caregiver no   Prior to hospitilization cognitive status: Alert/Oriented   Prior to hospitalization functional status: Independent   Current cognitive status: Alert/Oriented   Current Functional Status: Independent   Facility Arrived From: Home   Lives With child(barbara), dependent   Able to Return to Prior Arrangements yes   Is patient able to care for self after discharge? Yes   Who are your caregiver(s) and their phone number(s)? Yoli-mother: [phone]   Patient's perception of discharge disposition home or selfcare   Readmission Within the Last 30 Days no previous admission in last 30 days   Patient currently receives any other outside agency services?   (Social work services; [place], [place])   Equipment Currently Used at Home none   Do you have any problems affording any of your prescribed medications? No   Is the patient taking medications as prescribed? yes   Does the patient have transportation home? Yes   Transportation Anticipated family or friend will provide   Does the patient receive services at the [facility]? No   Discharge Plan A Home with family   Discharge Plan B Other  (TBD)   DME Needed Upon Discharge  other (see comments)  (TBD)   Patient/Family in Agreement with Plan yes   Does the patient have transportation to healthcare appointments? Yes   SW Role explained to patient; two patient identifiers recognized; SW contact information placed on Communication board. Discussed patient managing health care at home; determined who would be helping patient at home with recovery: Teenage children are help at home and children and mother will help with recovery    PCP: Lyndon Garcia MD  Prefers afternoon appointments    Extended Emergency Contact  Information  Primary Emergency Contact: HEATHER FIELD  Mobile Phone: [phone]  Relation: Mother     [id number] - [place] - [address]  [address] [address]  Phone: [phone] Fax: [phone]     Payor: MEDICAID / Plan: [facility] COMMUNITY PLAN [facility] ([place] MEDICAID) / Product Type: Managed Medicaid /

## 2020-10-10 NOTE — HPI
Karen Mccall is a 35 y.o. female that (in part)  has a past medical history of Anxiety, Asthma, GERD (gastroesophageal reflux disease), Mental disorder, Methamphetamine abuse, Migraine headache, Smoker, Suicidal ideation, Umbilical hernia, and Vaginal delivery.  has a past surgical history that includes Hysteroscopy; Dilation and curettage of uterus; Tubal ligation; and Vaginal delivery. Presents to [facility] initially complaining of chest pain, abdominal pain, nausea vomiting, and diffuse all throughout just.  Upon further questioning patient admits to taking methamphetamine and taking 6 tablets of ecstasy over the weekend.  Mother reported a and episode of seizure-like activity and near-syncope.  Was previously taking Trileptal and topiramate but admits noncompliance with home medication regimen.  Patient took antiemetics including Zofran prior to arrival but still complains of nausea with vomiting.    In the emergency department patient appeared to be having a withdrawal syndrome.  Routine laboratory studies, urinalysis, toxicology screen was obtained.  There is evidence of acute renal failure and sepsis with pyelonephritis.  Neutrophilic leukocytosis present.  Zosyn ceftriaxone given after cultures were obtained.  Toxicology screen positive for marijuana and methamphetamines.  Low-dose of benzodiazepine was given along with IV fluids.  Nephrology was consulted.    Hospital medicine has been asked to admit to inpatient for further evaluation and treatment.

## 2020-10-10 NOTE — CARE UPDATE
Patient has been seen and examianted,pateint is admitted for ARF,N/V,UTI,on IVF and CRT is improving,adjusted IVF,nephrology is following,her N/V is improved.advancing diet,urine culture growing Ecoli,will monitor.she feel much better at this time.

## 2020-10-10 NOTE — H&P
[facility] Medicine  History & Physical    Patient Name: Karen Mccall  MRN: [id number]  Admission Date: 10/10/2020  Attending Physician: Phil Live MD, MPH      PCP:     Lyndon Garcia MD    CC:     Chief Complaint   Patient presents with    Chest Pain     Pt c/o chest pain, abdominal pain, N/V that started yesterday. Unable to hold down water. Mom reports it looks like pt went into a seizure earlier, denies pt hx but reports family hx of epilepsy. Pain is 9/10. Pt took Zofran PTA    Abdominal Pain       HISTORY OF PRESENT ILLNESS:     Karen Mccall is a 35 y.o. female that (in part)  has a past medical history of Anxiety, Asthma, GERD (gastroesophageal reflux disease), Mental disorder, Methamphetamine abuse, Migraine headache, Smoker, Suicidal ideation, Umbilical hernia, and Vaginal delivery.  has a past surgical history that includes Hysteroscopy; Dilation and curettage of uterus; Tubal ligation; and Vaginal delivery. Presents to [facility] initially complaining of chest pain, abdominal pain, nausea vomiting, and diffuse all throughout just.  Upon further questioning patient admits to taking methamphetamine and taking 6 tablets of ecstasy over the weekend.  Mother reported a and episode of seizure-like activity and near-syncope.  Was previously taking Trileptal and topiramate but admits noncompliance with home medication regimen.  Patient took antiemetics including Zofran prior to arrival but still complains of nausea with vomiting.    In the emergency department patient appeared to be having a withdrawal syndrome.  Routine laboratory studies, urinalysis, toxicology screen was obtained.  There is evidence of acute renal failure and sepsis with pyelonephritis.  Neutrophilic leukocytosis present.  Zosyn ceftriaxone given after cultures were obtained.  Toxicology screen positive for marijuana and methamphetamines.  Low-dose of  benzodiazepine was given along with IV fluids.  Nephrology was consulted.    Hospital medicine has been asked to admit to inpatient for further evaluation and treatment.       REVIEW OF SYSTEMS:     -- Constitutional:  Generalized fatigue and malaise.  Subjective fever  -- Eyes: No visual changes, diplopia, pain, tearing, blind spots, or discharge.   -- Ears, nose, mouth, throat, and face: No congestion, sore throat, epistaxis, d/c, bleeding gums, neck stiffness masses, or dental issues.  -- Respiratory: No cough, shortness of breath, hemoptysis, stridor, wheezing, or night sweats.  -- Cardiovascular: No chest pain, PRATER, syncope, PND, edema, cyanosis, or palpitations.   -- Gastrointestinal:  As above in HPI  -- Genitourinary:  Positive increased urinary frequency and dysuria.  No hematuria, urgency, nocturia, polyuria, stones, or incontinence.  -- Integument/breast: No rash, pruritis, pigmentation changes, dryness, or changes in hair.  -- Hematologic/lymphatic: No easy bruising or lymphadenopathy.   -- Musculoskeletal:  Diffuse muscle aches.  No acute myalgias, joint swelling, acute limitations of ROM, or acute muscular weakness.  -- Neurological:  As above in the HPI.  -- Behavioral/Psych:  Polysubstance abuse.  History of suicidal ideations.  -- Endocrine: No heat or cold intolerance, polydipsia, or unintentional weight gain / loss.  -- Allergy/Immunologic: No recurrent infections or adverse reaction to food, insects, or difficulty breathing.      PAST MEDICAL / SURGICAL HISTORY:     Past Medical History:   Diagnosis Date    Anxiety     Asthma     as a child, no recent episodes    GERD (gastroesophageal reflux disease)     Mental disorder     Methamphetamine abuse     Migraine headache     Smoker     Suicidal ideation     Umbilical hernia     Vaginal delivery     x4     Past Surgical History:   Procedure Laterality Date    DILATION AND CURETTAGE OF UTERUS      HYSTEROSCOPY      TUBAL LIGATION       VAGINAL DELIVERY      x 4         FAMILY HISTORY:     Family History   Problem Relation Age of Onset    Cancer Neg Hx     Eclampsia Neg Hx          SOCIAL HISTORY:     Social History     Socioeconomic History    Marital status: Single     Spouse name: Not on file    Number of children: Not on file    Years of education: Not on file    Highest education level: Not on file   Occupational History    Not on file   Social Needs    Financial resource strain: Not on file    Food insecurity     Worry: Not on file     Inability: Not on file    Transportation needs     Medical: Not on file     Non-medical: Not on file   Tobacco Use    Smoking status: Current Every Day Smoker     Packs/day: 1.00     Years: 5.00     Pack years: 5.00    Smokeless tobacco: Never Used   Substance and Sexual Activity    Alcohol use: Yes     Comment: socially    Drug use: No    Sexual activity: Yes     Partners: Male     Birth control/protection: None   Lifestyle    Physical activity     Days per week: Not on file     Minutes per session: Not on file    Stress: Not on file   Relationships    Social connections     Talks on phone: Not on file     Gets together: Not on file     Attends Temple service: Not on file     Active member of club or organization: Not on file     Attends meetings of clubs or organizations: Not on file     Relationship status: Not on file   Other Topics Concern    Not on file   Social History Narrative    Together since 2011    She is a homemaker.             ALLERGIES:       Review of patient's allergies indicates:  No Known Allergies        HOME MEDICATIONS:     Prior to Admission medications    Medication Sig Start Date End Date Taking? Authorizing Provider   busPIRone (BUSPAR) 15 MG tablet Take 15 mg by mouth 3 (three) times daily.   Yes Historical Provider   clonazePAM (KLONOPIN) 0.5 MG tablet Take 0.5 mg by mouth 2 (two) times daily as needed for Anxiety.   Yes Historical Provider   fluticasone  propionate (FLONASE) 50 mcg/actuation nasal spray 1-2 sprays ( mcg total) by Each Nostril route once daily. 2/12/20  Yes Lyndon Garcia MD   gabapentin (NEURONTIN) 300 MG capsule Take 1 capsule (300 mg total) by mouth 2 (two) times daily. 3/13/20  Yes Lyndon Garcia MD   loratadine (CLARITIN) 10 mg tablet TAKE ONE TABLET BY MOUTH EVERY DAY 9/22/20  Yes Lyndon Garcia MD   meclizine (ANTIVERT) 25 mg tablet Take 1 tablet (25 mg total) by mouth 3 (three) times daily as needed for Dizziness. 2/12/20  Yes Lyndon Garcia MD   meloxicam (MOBIC) 15 MG tablet Take 1 tablet (15 mg total) by mouth once daily. 3/13/20  Yes Lyndon Garcia MD   metoprolol tartrate (LOPRESSOR) 25 MG tablet Take 25 mg by mouth 2 (two) times daily.   Yes Historical Provider   omeprazole (PRILOSEC) 40 MG capsule Take 1 capsule (40 mg total) by mouth once daily. 1/13/20 1/12/21 Yes Lyndon Garcia MD   OXcarbazepine (TRILEPTAL) 600 MG Tab Take 1 tablet (600 mg total) by mouth 2 (two) times daily. 3/13/20  Yes Lyndon Garcia MD   paroxetine (PAXIL) 20 MG tablet Take 1 tablet (20 mg total) by mouth every morning. 3/13/20  Yes Lyndon Garcia MD   topiramate (TOPAMAX) 25 MG tablet TAKE 1 TABLET BY MOUTH TWICE A DAY 6/1/20  Yes Lyndon Garcia MD   traMADoL (ULTRAM) 50 mg tablet TAKE ONE TABLET BY MOUTH EVERY TWELVE HOURS AS NEEDED 3/13/20  Yes Lyndon Garcia MD   butalbital-acetaminophen-caffeine -40 mg (FIORICET, ESGIC) -40 mg per tablet Take 1 tablet by mouth once daily. 3/13/20   Lyndon Garcia MD   sumatriptan (IMITREX) 50 MG tablet Take 1 tablet (50 mg total) by mouth once. May take another tablet after 2 hours if the headache recurs. Maximum of 4 tablets a day. for 1 dose 3/13/20 3/13/20  Lyndon Garcia MD          HOSPITAL MEDICATIONS:     Scheduled Meds:    cefTRIAXone (ROCEPHIN) IVPB  1 g Intravenous Q12H    heparin (porcine)  5,000 Units Subcutaneous Q8H    mupirocin   Nasal BID    senna-docusate 8.6-50 mg  1 tablet Oral Daily    sodium chloride 0.9%  10 mL Intravenous Q6H     Continuous Infusions:     sodium chloride 0.9% 125 mL/hr at 10/10/20 0109     PRN Meds: acetaminophen, bisacodyL, HYDROcodone-acetaminophen, influenza, melatonin, mineral oil, morphine, ondansetron, polyethylene glycol, promethazine, Flushing PICC Protocol **AND** sodium chloride 0.9% **AND** sodium chloride 0.9%      PHYSICAL EXAM:     Wt Readings from Last 1 Encounters:   10/10/20 0117 56 kg (123 lb 7.3 oz)   10/09/20 1625 61.2 kg (135 lb)     Body mass index is 21.87 kg/m².  Vitals:    10/10/20 0051 10/10/20 0117 10/10/20 0121 10/10/20 0448   BP: 114/68 104/61 117/62 (!) 96/52   BP Location:  Left arm Left arm Left arm   Patient Position:  Lying Sitting Lying   Pulse: 108 (!) 111 (!) 115 88   Resp: 16 16 16 16   Temp:  98 °F (36.7 °C) 98 °F (36.7 °C) 98.5 °F (36.9 °C)   TempSrc:  Oral Oral Oral   SpO2: 100% 99% 98% 100%   Weight:  56 kg (123 lb 7.3 oz)     Height:              -- General appearance: well developed. appears stated age   -- Head: normocephalic, atraumatic   -- Eyes: conjunctivae clear. Extraocular muscles intact  -- Nose: Nares normal. Septum midline.   -- Mouth/Throat: lips, mucosa, and tongue normal. no throat erythema.   -- Neck: No JVD,  Supple, symmetrical, trachea midline, thyroid not grossly enlarged, appears symmetric  -- Lungs: clear to auscultation bilaterally. normal respiratory effort. No use of accessory muscles.   -- Chest wall: no tenderness. equal bilateral chest rise   -- Heart:  Rapid rate and regular rhythm. S1, S2 normal.  no click, rub or gallop   -- Abdomen: soft, non-tender, non-distended, non-tympanic; bowel sounds normal; no masses  -- Extremities:  PICC line present.  no cyanosis, clubbing or edema.   -- Pulses: 2+ and symmetric   -- Skin:  Turgor normal. Color normal. Texture normal. No rashes or lesions.   -- Neurologic:  Somnolent.  Normal strength and tone. No focal numbness or weakness. CNII-XII intact. Shad coma scale: eyes open spontaneously-4, oriented & converses-5, obeys  commands-6.      LABORATORY STUDIES:     Recent Results (from the past 36 hour(s))   POCT urine pregnancy    Collection Time: 10/09/20  4:45 PM   Result Value Ref Range    POC Preg Test, Ur Negative Negative     Acceptable Yes    Urinalysis, Reflex to Urine Culture Urine, Clean Catch    Collection Time: 10/09/20  5:05 PM    Specimen: Urine   Result Value Ref Range    Specimen UA Urine, Clean Catch     Color, UA Yellow Yellow, Straw, Monika    Appearance, UA Cloudy (A) Clear    pH, UA 5.0 5.0 - 8.0    Specific Gravity, UA 1.015 1.005 - 1.030    Protein, UA 3+ (A) Negative    Glucose, UA Negative Negative    Ketones, UA Negative Negative    Bilirubin (UA) Negative Negative    Occult Blood UA 2+ (A) Negative    Nitrite, UA Negative Negative    Urobilinogen, UA Negative <2.0 EU/dL    Leukocytes, UA 3+ (A) Negative   Urinalysis Microscopic    Collection Time: 10/09/20  5:05 PM   Result Value Ref Range    RBC, UA 35 (H) 0 - 4 /hpf    WBC, UA >100 (H) 0 - 5 /hpf    Bacteria Moderate (A) None-Occ /hpf    Squam Epithel, UA 5 /hpf    Hyaline Casts, UA 0 0-1/lpf /lpf    Microscopic Comment SEE COMMENT    Drug screen panel, emergency    Collection Time: 10/09/20  5:05 PM   Result Value Ref Range    Benzodiazepines Negative     Methadone metabolites Negative     Cocaine (Metab.) Negative     Opiate Scrn, Ur Negative     Barbiturate Screen, Ur Negative     Amphetamine Screen, Ur Presumptive Positive     THC Presumptive Positive     Phencyclidine Negative     Creatinine, Random Ur 109.7 15.0 - 325.0 mg/dL    Toxicology Information SEE COMMENT    CBC auto differential    Collection Time: 10/09/20  6:50 PM   Result Value Ref Range    WBC 20.89 (H) 3.90 - 12.70 K/uL    RBC 4.96 4.00 - 5.40 M/uL    Hemoglobin 14.2 12.0 - 16.0 g/dL    Hematocrit 41.6 37.0 - 48.5 %    Mean Corpuscular Volume 84 82 - 98 fL    Mean Corpuscular Hemoglobin 28.6 27.0 - 31.0 pg    Mean Corpuscular Hemoglobin Conc 34.1 32.0 - 36.0 g/dL    RDW  13.4 11.5 - 14.5 %    Platelets 232 150 - 350 K/uL    MPV 10.3 9.2 - 12.9 fL    Immature Granulocytes CANCELED 0.0 - 0.5 %    Immature Grans (Abs) CANCELED 0.00 - 0.04 K/uL    Lymph # CANCELED 1.0 - 4.8 K/uL    Mono # CANCELED 0.3 - 1.0 K/uL    Eos # CANCELED 0.0 - 0.5 K/uL    Baso # CANCELED 0.00 - 0.20 K/uL    nRBC 0 0 /100 WBC    Gran% 90.0 (H) 38.0 - 73.0 %    Lymph% 1.0 (L) 18.0 - 48.0 %    Mono% 4.0 4.0 - 15.0 %    Eosinophil% 0.0 0.0 - 8.0 %    Basophil% 0.0 0.0 - 1.9 %    Bands 5.0 %    Differential Method Automated    Comprehensive metabolic panel    Collection Time: 10/09/20  6:50 PM   Result Value Ref Range    Sodium 131 (L) 136 - 145 mmol/L    Potassium 3.7 3.5 - 5.1 mmol/L    Chloride 91 (L) 95 - 110 mmol/L    CO2 19 (L) 23 - 29 mmol/L    Glucose 95 70 - 110 mg/dL    BUN, Bld 45 (H) 6 - 20 mg/dL    Creatinine 4.2 (H) 0.5 - 1.4 mg/dL    Calcium 9.3 8.7 - 10.5 mg/dL    Total Protein 8.1 6.0 - 8.4 g/dL    Albumin 3.8 3.5 - 5.2 g/dL    Total Bilirubin 1.0 0.1 - 1.0 mg/dL    Alkaline Phosphatase 83 55 - 135 U/L    AST 20 10 - 40 U/L    ALT 20 10 - 44 U/L    Anion Gap 21 (H) 8 - 16 mmol/L    eGFR if African American 15 (A) >60 mL/min/1.73 m^2    eGFR if non African American 13 (A) >60 mL/min/1.73 m^2   Lipase    Collection Time: 10/09/20  6:50 PM   Result Value Ref Range    Lipase 10 4 - 60 U/L   Troponin I    Collection Time: 10/09/20  6:50 PM   Result Value Ref Range    Troponin I <0.006 0.000 - 0.026 ng/mL   Blood Culture #1 **CANNOT BE ORDERED STAT**    Collection Time: 10/09/20  6:50 PM    Specimen: Peripheral, Antecubital, Right; Blood   Result Value Ref Range    Blood Culture, Routine No Growth to date    Lactic acid, plasma    Collection Time: 10/09/20  6:50 PM   Result Value Ref Range    Lactate (Lactic Acid) 1.3 0.5 - 2.2 mmol/L   Ethanol    Collection Time: 10/09/20  6:50 PM   Result Value Ref Range    Alcohol, Medical, Serum <10 <10 mg/dL   TSH    Collection Time: 10/09/20  6:50 PM   Result Value  Ref Range    TSH 0.890 0.400 - 4.000 uIU/mL   Blood Culture #2 **CANNOT BE ORDERED STAT**    Collection Time: 10/09/20  7:45 PM    Specimen: Peripheral, Forearm, Right; Blood   Result Value Ref Range    Blood Culture, Routine No Growth to date    POCT COVID-19 Rapid Screening    Collection Time: 10/09/20  9:32 PM   Result Value Ref Range    POC Rapid COVID Negative Negative     Acceptable Yes    Lactic Acid, Plasma    Collection Time: 10/09/20  9:50 PM   Result Value Ref Range    Lactate (Lactic Acid) 1.2 0.5 - 2.2 mmol/L   CPK    Collection Time: 10/09/20  9:50 PM   Result Value Ref Range    CPK 25 20 - 180 U/L   Salicylate level    Collection Time: 10/09/20  9:50 PM   Result Value Ref Range    Salicylate Lvl <5.0 (L) 15.0 - 30.0 mg/dL   ISTAT PROCEDURE    Collection Time: 10/09/20 10:09 PM   Result Value Ref Range    POC PH 7.347 (L) 7.35 - 7.45    POC PCO2 33.9 (L) 35 - 45 mmHg    POC PO2 16 (LL) 40 - 60 mmHg    POC HCO3 18.6 (L) 24 - 28 mmol/L    POC BE -6 -2 to 2 mmol/L    POC SATURATED O2 21 (L) 95 - 100 %    POC TCO2 20 (L) 24 - 29 mmol/L    Sample VENOUS     Site Other     Allens Test N/A     DelSys Room Air     Mode SPONT     FiO2 21     Sp02 100    Creatinine, urine, random    Collection Time: 10/09/20 10:30 PM   Result Value Ref Range    Creatinine, Random Ur 54.2 15.0 - 325.0 mg/dL   Protein / creatinine ratio, urine    Collection Time: 10/09/20 10:30 PM   Result Value Ref Range    Protein, Urine Random 66 mg/dL    Creatinine, Random Ur 54.2 15.0 - 325.0 mg/dL    Prot/Creat Ratio, Ur 1.22 (H) 0.00 - 0.20   Protein, Quantitative, Urine Random    Collection Time: 10/09/20 10:30 PM   Result Value Ref Range    Protein, Urine Random 66 mg/dL   Chloride, urine, random    Collection Time: 10/09/20 10:30 PM   Result Value Ref Range    Chloride, Rand Ur 29 25 - 200 mmol/L   Sodium, urine, random    Collection Time: 10/09/20 10:30 PM   Result Value Ref Range    Sodium Urine Random 50 20 - 250 mmol/L    Protein/Creatinine Ratio, Urine    Collection Time: 10/09/20 10:30 PM   Result Value Ref Range    Protein, Urine Random 66 mg/dL    Creatinine, Random Ur 54.2 15.0 - 325.0 mg/dL    Prot/Creat Ratio, Ur 1.22 (H) 0.00 - 0.20   Potassium, Random Urine    Collection Time: 10/09/20 10:30 PM   Result Value Ref Range    Potassium Urine Random 22 15 - 95 mmol/L   Chloride, Random Urine    Collection Time: 10/09/20 10:30 PM   Result Value Ref Range    Chloride, Rand Ur 29 25 - 200 mmol/L   Sodium, Random Urine    Collection Time: 10/09/20 10:30 PM   Result Value Ref Range    Sodium Urine Random 50 20 - 250 mmol/L   Creatinine, Random Urine    Collection Time: 10/09/20 10:30 PM   Result Value Ref Range    Creatinine, Random Ur 54.2 15.0 - 325.0 mg/dL   Srinivasan's Stain, Urine Random    Collection Time: 10/09/20 10:30 PM   Result Value Ref Range    Srinivasan's Stain, Ur No eosinophils seen No eosinophils seen       No results found for: INR, PROTIME  Lab Results   Component Value Date    HGBA1C 4.5 06/27/2014     No results for input(s): POCTGLUCOSE in the last 72 hours.        MICROBIOLOGY DATA:     Urine Culture, Routine   Date Value Ref Range Status   10/09/2018 No significant growth  Final   10/11/2016 No significant growth  Final   04/09/2014 No significant growth  Final   03/07/2012   Final    >100,000 ORGANISMS/ML -ESCHERICHIA COLI  Amp/Sulbactam        >16/8      RESISTANT  Amikacin             <=16       SENSITIVE  Ampicillin           >16        RESISTANT  Amox/K Clav          <=8/4      SENSITIVE  Aztreonam            <=8        SENSITIVE  Ceftriaxone          <=8        SENSITIVE  Ceftazidime          <=1        SENSITIVE  Cephalothin          <=8        SENSITIVE  Cefotaxime           <=2        SENSITIVE  Cefazolin            <=8        SENSITIVE  Ciprofloxacin        <=1        SENSITIVE  Cefuroxime           <=4        SENSITIVE  Ertapenem            <=2        SENSITIVE  Nitrofurantoin       <=32        SENSITIVE  Gemifloxacin         <=0.25     SENSITIVE  Gentamicin           <=1        SENSITIVE  Imipenem             <=4        SENSITIVE  Levofloxacin         <=2        SENSITIVE  Pipercillin/ Tazobactam <=16       SENSITIVE  Bactrim              >2/38      RESISTANT  Tetracycline         <=4        SENSITIVE  Timentin             <=16       SENSITIVE  Tobramycin           <=2        SENSITIVE   07/11/2011 NO SIGNIFICANT GROWTH  Final       Microbiology x 7d:   Microbiology Results (last 7 days)     Procedure Component Value Units Date/Time    Blood Culture #1 **CANNOT BE ORDERED STAT** [[id number]] Collected: 10/09/20 1850    Order Status: Completed Specimen: Blood from Peripheral, Antecubital, Right Updated: 10/10/20 0312     Blood Culture, Routine No Growth to date    Blood Culture #2 **CANNOT BE ORDERED STAT** [[id number]] Collected: 10/09/20 1945    Order Status: Completed Specimen: Blood from Peripheral, Forearm, Right Updated: 10/10/20 0312     Blood Culture, Routine No Growth to date    Gram stain [[id number]] Collected: 10/09/20 2231    Order Status: Sent Specimen: Urine Updated: 10/09/20 2245    Urine culture [[id number]] Collected: 10/09/20 1705    Order Status: No result Specimen: Urine Updated: 10/09/20 1728            IMAGING:     Imaging Results          X-Ray Chest 1 View for PICC_Central line (Final result)  Result time 10/10/20 00:45:48    Final result by Shay Mercado MD (10/10/20 00:45:48)                 Impression:      As above.      Electronically signed by: Shay Mercado MD  Date:    10/10/2020  Time:    00:45             Narrative:    EXAMINATION:  XR CHEST 1 VIEW    CLINICAL HISTORY:  Evaluate PICC line placement;    TECHNIQUE:  Single frontal view of the chest was performed.    COMPARISON:  10/09/2020.    FINDINGS:  Right-sided PICC line is visualized with distal tip over the SVC.  Heart is normal in size.  Lungs are clear and symmetrically expanded.  No pneumothorax.                                US Pelvis Comp with Transvag NON-OB (xpd) (Final result)  Result time 10/10/20 00:13:31   Procedure changed from US Pelvis Complete Non OB     Final result by Juan F Wong DO (10/10/20 00:13:31)                 Impression:      1. Nonvisualization of the left ovary.  2. The right ovary is unremarkable without torsion.      Electronically signed by: Juan F Wong  Date:    10/10/2020  Time:    00:13             Narrative:    EXAMINATION:  US PELVIS COMP WITH TRANSVAG NON-OB (XPD)    CLINICAL HISTORY:  r/o torsion.    TECHNIQUE:  Transabdominal sonography of the pelvis was performed, followed by transvaginal sonography to better evaluate the uterus and ovaries.    COMPARISON:  Pelvic ultrasound from 04/11/2013.    FINDINGS:  Transvaginal exam is limited due to patient's ability to tolerate exam.    Uterus: The uterus is retroverted.    Size: 7.0 x 4.0 x 5.8 cm    Masses: None    Endometrium: Normal in this pre menopausal patient, measuring 1 mm.    Right ovary:    Size: 3.7 x 1.7 x 3.2 cm    Appearance: There are normal ovarian follicles.    Vascular flow: Normal.    Left ovary: The left ovary is not visualized.    Free Fluid:    None.                               US Retroperitoneal Complete (Final result)  Result time 10/10/20 00:11:10   Procedure changed from US Abdomen Pelvis Doppler Study Limited     Final result by Shay Mercado MD (10/10/20 00:11:10)                 Impression:      No acute abnormalities identified.  No evidence of hydronephrosis.    Asymmetric decreased size of the right kidney with cortical thinning.      Electronically signed by: Shay Mercado MD  Date:    10/10/2020  Time:    00:11             Narrative:    EXAMINATION:  US RETROPERITONEAL COMPLETE    CLINICAL HISTORY:  kidney failure; Unspecified kidney failure    TECHNIQUE:  Ultrasound of the kidneys and urinary bladder was performed including color flow and Doppler evaluation of the kidneys.    COMPARISON:  CT  abdomen and pelvis from the same date.    FINDINGS:  The kidneys measure 8.0 cm on the right and 12.5 cm on the left.  Decreased cortical thickness is seen on the right.  No evidence of renal stones or hydronephrosis.  No solid renal mass seen.  Small cyst is seen on the right measure 2.3 cm.  Perfusion to the kidneys is normal. Resistive indices are normal and as follow: 0.66 on the right and 0.62 on the left. The urinary bladder appears normal.  Bilateral ureteral jets are seen.                               CT Renal Stone Study ABD Pelvis WO (Final result)  Result time 10/09/20 19:34:03    Final result by Shay Mercado MD (10/09/20 19:34:03)                 Impression:      1. No acute intra-abdominal abnormalities identified.  No evidence of stones or obstructive uropathy.  2. Small atrophic right kidney.      Electronically signed by: Shay Mercado MD  Date:    10/09/2020  Time:    19:34             Narrative:    EXAMINATION:  CT RENAL STONE STUDY ABD PELVIS WO    CLINICAL HISTORY:  Flank pain, kidney stone suspected;    TECHNIQUE:  Low dose axial images, sagittal and coronal reformations were obtained from the lung bases to the pubic symphysis.  Contrast was not administered.    COMPARISON:  None    FINDINGS:  The visualized portion of the heart is unremarkable.  The lung bases are clear.    No significant hepatic abnormality seen on this noncontrast exam.  Liver is mildly enlarged measuring 19 cm.  There is no intra-or extrahepatic biliary ductal dilatation.  The gallbladder is unremarkable.  The stomach, pancreas, spleen, and adrenal glands are unremarkable.    Right kidney is small and atrophic.  Left kidney is normal in size and shows no stones or hydronephrosis.  Ureters are difficult to track.  Urinary bladder is nondistended.  Uterus is retroverted.  No significant adnexal abnormalities are seen.    Appendix is visualized and is unremarkable.  The visualized loops of small and large bowel show no  evidence of obstruction or inflammation.  No free air or free fluid.    Aorta tapers normally.    No acute osseous abnormality identified.  L2 limbus vertebral body is noted.  Subcutaneous soft tissue structures are unremarkable.                               X-Ray Chest 1 View (Final result)  Result time 10/09/20 17:17:43    Final result by Shay Mercado MD (10/09/20 17:17:43)                 Impression:      No acute cardiopulmonary process identified.      Electronically signed by: Shay Mercado MD  Date:    10/09/2020  Time:    17:17             Narrative:    EXAMINATION:  XR CHEST 1 VIEW    CLINICAL HISTORY:  Chest pain, unspecified    TECHNIQUE:  Single frontal view of the chest was performed.    COMPARISON:  None    FINDINGS:  Cardiac silhouette is normal in size.  Lungs are symmetrically expanded.  No evidence of focal consolidative process, pneumothorax, or significant pleural effusion.  No acute osseous abnormality identified.                                  CONSULTS:     IP CONSULT TO PICC TEAM (VIVIAN)  IP CONSULT TO NEPHROLOGY       ASSESSMENT & PLAN:     Primary Diagnosis:  Sepsis    Active Hospital Problems    Diagnosis  POA    *Sepsis [A41.9]  Yes     Priority: 1 - High    Methamphetamine abuse [F15.10]  Yes    Marijuana abuse [F12.10]  Yes    Acute renal failure [N17.9]  Yes    Tobacco use disorder complicating pregnancy, childbirth, or the puerperium, antepartum condition or complication [O99.330]  Yes     Dx updated per 2019 IMO Load        Resolved Hospital Problems   No resolved problems to display.       Sepsis secondary to pyelonephritis  · As evidenced by History, physical exam, and urinalysis  · Obtain Urine culture and Gram stain prior to antibiotics  · Given empiric antibiotics w/ Ceftriaxone and Zosyn in the ED; continuing ceftriaxone  · CT of abdomen pelvis negative for renal stone or perinephric abscess  · Will tailor antibiotic regimen according to culture & sensitivity  results  · Maintain euvolemic status with IV fluids  · HIV and hepatitis panel pending; suspicious for IV drug use    Polysubstance abuse   · Chronic tobacco use  · Chronic illicit drug use - methamphetamine, ecstasy, marijuana abuse  · Case management consult to provide community resources for substance abuse  · Nicotine patch offered; considered Wellbutrin or Chantix through PCP as an outpatient (will require closer monitoring)  · Tobacco cessation counseling: I discussed with the patient regarding the hazardous effects of smoking on increasing risk of heart attack and stroke, worsening lung functions, and increasing cancer risk.   Patient was urged to stop smoking now.  I also offered nicotine taper (such as nicotine patch and gum) to help ease the craving to smoke.    Acute renal failure  · As evidenced by decrease in GFR.  Baseline creatinine = 0.9  · Will evaluate for pre-renal, intrarenal, and post-obstructive etiology.  · Obtain:  1. Protein/creatinine ratio  2. Urine and serum osmolalities  3. Urine electrolytes (Na, Cl, K, Ca)  4. Uric Acid  5. Urea  6. LDH  7. Srinivasan Stain  · Renal ultrasound  · Monitor with serial Cr / GFR levels closely with serial labs  · Avoid nephrotoxic medications such as NSAIDs, IV contrast, or RAAS blockade  · Nephrology consult  · Other considerations after initial work-up  · Glomerulonephritis/Nephritis/Vasculitis:  ANCA, BONNIE, Anti-GBM, Anti DS-DNA, C3/C4, Immunoglobulins, Cryoglobulins, Hepatitis panel, HIV, Renal Biopsy  · Interstitial nephritis:  Eosinophilia, eosinophiluria, renal biopsy  · Abdominal compartment syndrome:  Intravesicular pressure  · TTP/HUS: fragmentocytes, LDH, platelets, reticulocytes, haptoglobin, bilirubin  ·  Myeloma:  Serum/urine protein, electrophoresis, renal biopsy  · Sepsis:  Sepsis screen, cultures, inflammatory markers, procalcitonin level  · Cardio-renal syndrome:  Troponin, CK-MB, BMP, cardiac imaging                VTE Risk Mitigation (From  admission, onward)         Ordered     heparin (porcine) injection 5,000 Units  Every 8 hours      10/10/20 0454     IP VTE HIGH RISK PATIENT  Once      10/10/20 0454                  Adult PRN medications available   DVT prophylaxis given       DISPOSITION:     Will admit to the Hospital Medicine service for further evaluation and treatment.    Chart reviewed and updated where applicable.    High Risk Conditions:  Patient has a condition that poses threat to life and bodily function: Acute Renal Failure and sepsis secondary to pyelonephritis      ===============================================================    Phil Live MD, MPH  Department of Hospital Medicine   [facility]  [phone]  (7pm - 6am)          This note is dictated using [employer] voice recognition software.  There are word recognition mistakes that are occasionally missed on review.

## 2020-10-10 NOTE — NURSING
Pt c/o CP pain 7/10 BP:124/81 HR:94 100% RA anxious states she feels that it is from not taking her Buspar that she takes at home. NAD noted. Dr. Hill notified, new orders in place.

## 2020-10-11 PROBLEM — E87.20 METABOLIC ACIDOSIS: Status: ACTIVE | Noted: 2020-10-11

## 2020-10-11 PROBLEM — N10 ACUTE PYELONEPHRITIS: Status: ACTIVE | Noted: 2020-10-11

## 2020-10-11 LAB
ALBUMIN SERPL BCP-MCNC: 2.2 G/DL (ref 3.5–5.2)
ALBUMIN SERPL BCP-MCNC: 2.2 G/DL (ref 3.5–5.2)
ALP SERPL-CCNC: 121 U/L (ref 55–135)
ALP SERPL-CCNC: 121 U/L (ref 55–135)
ALT SERPL W/O P-5'-P-CCNC: 24 U/L (ref 10–44)
ALT SERPL W/O P-5'-P-CCNC: 24 U/L (ref 10–44)
ANION GAP SERPL CALC-SCNC: 13 MMOL/L (ref 8–16)
ANION GAP SERPL CALC-SCNC: 13 MMOL/L (ref 8–16)
AST SERPL-CCNC: 27 U/L (ref 10–40)
AST SERPL-CCNC: 27 U/L (ref 10–40)
BACTERIA UR CULT: ABNORMAL
BASOPHILS # BLD AUTO: 0.03 K/UL (ref 0–0.2)
BASOPHILS NFR BLD: 0.3 % (ref 0–1.9)
BILIRUB SERPL-MCNC: 0.7 MG/DL (ref 0.1–1)
BILIRUB SERPL-MCNC: 0.7 MG/DL (ref 0.1–1)
BUN SERPL-MCNC: 19 MG/DL (ref 6–20)
BUN SERPL-MCNC: 19 MG/DL (ref 6–20)
CALCIUM SERPL-MCNC: 7.7 MG/DL (ref 8.7–10.5)
CALCIUM SERPL-MCNC: 7.7 MG/DL (ref 8.7–10.5)
CHLORIDE SERPL-SCNC: 99 MMOL/L (ref 95–110)
CHLORIDE SERPL-SCNC: 99 MMOL/L (ref 95–110)
CO2 SERPL-SCNC: 24 MMOL/L (ref 23–29)
CO2 SERPL-SCNC: 24 MMOL/L (ref 23–29)
CREAT SERPL-MCNC: 1.6 MG/DL (ref 0.5–1.4)
CREAT SERPL-MCNC: 1.6 MG/DL (ref 0.5–1.4)
DIFFERENTIAL METHOD: ABNORMAL
EOSINOPHIL # BLD AUTO: 0 K/UL (ref 0–0.5)
EOSINOPHIL NFR BLD: 0.2 % (ref 0–8)
ERYTHROCYTE [DISTWIDTH] IN BLOOD BY AUTOMATED COUNT: 13.3 % (ref 11.5–14.5)
EST. GFR  (AFRICAN AMERICAN): 48 ML/MIN/1.73 M^2
EST. GFR  (AFRICAN AMERICAN): 48 ML/MIN/1.73 M^2
EST. GFR  (NON AFRICAN AMERICAN): 41 ML/MIN/1.73 M^2
EST. GFR  (NON AFRICAN AMERICAN): 41 ML/MIN/1.73 M^2
GLUCOSE SERPL-MCNC: 240 MG/DL (ref 70–110)
GLUCOSE SERPL-MCNC: 240 MG/DL (ref 70–110)
HCT VFR BLD AUTO: 26.2 % (ref 37–48.5)
HGB BLD-MCNC: 8.9 G/DL (ref 12–16)
IMM GRANULOCYTES # BLD AUTO: 0.08 K/UL (ref 0–0.04)
IMM GRANULOCYTES NFR BLD AUTO: 0.8 % (ref 0–0.5)
LYMPHOCYTES # BLD AUTO: 0.6 K/UL (ref 1–4.8)
LYMPHOCYTES NFR BLD: 6.1 % (ref 18–48)
MCH RBC QN AUTO: 29 PG (ref 27–31)
MCHC RBC AUTO-ENTMCNC: 34 G/DL (ref 32–36)
MCV RBC AUTO: 85 FL (ref 82–98)
MONOCYTES # BLD AUTO: 0.8 K/UL (ref 0.3–1)
MONOCYTES NFR BLD: 7.4 % (ref 4–15)
NEUTROPHILS # BLD AUTO: 8.9 K/UL (ref 1.8–7.7)
NEUTROPHILS NFR BLD: 85.2 % (ref 38–73)
NRBC BLD-RTO: 0 /100 WBC
PLATELET # BLD AUTO: 138 K/UL (ref 150–350)
PMV BLD AUTO: 11.7 FL (ref 9.2–12.9)
POTASSIUM SERPL-SCNC: 3.2 MMOL/L (ref 3.5–5.1)
POTASSIUM SERPL-SCNC: 3.2 MMOL/L (ref 3.5–5.1)
PROT SERPL-MCNC: 5.1 G/DL (ref 6–8.4)
PROT SERPL-MCNC: 5.1 G/DL (ref 6–8.4)
RBC # BLD AUTO: 3.07 M/UL (ref 4–5.4)
SODIUM SERPL-SCNC: 136 MMOL/L (ref 136–145)
SODIUM SERPL-SCNC: 136 MMOL/L (ref 136–145)
SODIUM UR-SCNC: 62 MMOL/L (ref 20–250)
WBC # BLD AUTO: 10.49 K/UL (ref 3.9–12.7)

## 2020-10-11 PROCEDURE — 63600175 PHARM REV CODE 636 W HCPCS: Performed by: HOSPITALIST

## 2020-10-11 PROCEDURE — 99900035 HC TECH TIME PER 15 MIN (STAT)

## 2020-10-11 PROCEDURE — 94761 N-INVAS EAR/PLS OXIMETRY MLT: CPT

## 2020-10-11 PROCEDURE — 80053 COMPREHEN METABOLIC PANEL: CPT

## 2020-10-11 PROCEDURE — 25000003 PHARM REV CODE 250: Performed by: HOSPITALIST

## 2020-10-11 PROCEDURE — 21400001 HC TELEMETRY ROOM

## 2020-10-11 PROCEDURE — 84300 ASSAY OF URINE SODIUM: CPT

## 2020-10-11 PROCEDURE — 85025 COMPLETE CBC W/AUTO DIFF WBC: CPT

## 2020-10-11 PROCEDURE — A4216 STERILE WATER/SALINE, 10 ML: HCPCS | Performed by: HOSPITALIST

## 2020-10-11 RX ORDER — SODIUM CHLORIDE 9 MG/ML
INJECTION, SOLUTION INTRAVENOUS CONTINUOUS
Status: DISCONTINUED | OUTPATIENT
Start: 2020-10-11 | End: 2020-10-12

## 2020-10-11 RX ORDER — POTASSIUM CHLORIDE 20 MEQ/1
40 TABLET, EXTENDED RELEASE ORAL ONCE
Status: COMPLETED | OUTPATIENT
Start: 2020-10-11 | End: 2020-10-11

## 2020-10-11 RX ADMIN — ACETAMINOPHEN 650 MG: 325 TABLET ORAL at 05:10

## 2020-10-11 RX ADMIN — BUSPIRONE HYDROCHLORIDE 15 MG: 10 TABLET ORAL at 09:10

## 2020-10-11 RX ADMIN — HEPARIN SODIUM 5000 UNITS: 5000 INJECTION INTRAVENOUS; SUBCUTANEOUS at 06:10

## 2020-10-11 RX ADMIN — SODIUM CHLORIDE: 0.9 INJECTION, SOLUTION INTRAVENOUS at 07:10

## 2020-10-11 RX ADMIN — BUSPIRONE HYDROCHLORIDE 15 MG: 10 TABLET ORAL at 03:10

## 2020-10-11 RX ADMIN — SODIUM BICARBONATE: 84 INJECTION, SOLUTION INTRAVENOUS at 02:10

## 2020-10-11 RX ADMIN — Medication 10 ML: at 06:10

## 2020-10-11 RX ADMIN — MUPIROCIN: 20 OINTMENT TOPICAL at 09:10

## 2020-10-11 RX ADMIN — POTASSIUM CHLORIDE 40 MEQ: 1500 TABLET, EXTENDED RELEASE ORAL at 09:10

## 2020-10-11 RX ADMIN — DOCUSATE SODIUM 50 MG AND SENNOSIDES 8.6 MG 1 TABLET: 8.6; 5 TABLET, FILM COATED ORAL at 08:10

## 2020-10-11 RX ADMIN — CEFTRIAXONE 1 G: 1 INJECTION, SOLUTION INTRAVENOUS at 06:10

## 2020-10-11 RX ADMIN — SODIUM CHLORIDE: 0.9 INJECTION, SOLUTION INTRAVENOUS at 09:10

## 2020-10-11 RX ADMIN — BUSPIRONE HYDROCHLORIDE 15 MG: 10 TABLET ORAL at 08:10

## 2020-10-11 RX ADMIN — MUPIROCIN: 20 OINTMENT TOPICAL at 08:10

## 2020-10-11 RX ADMIN — HEPARIN SODIUM 5000 UNITS: 5000 INJECTION INTRAVENOUS; SUBCUTANEOUS at 03:10

## 2020-10-11 RX ADMIN — HYDROCODONE BITARTRATE AND ACETAMINOPHEN 1 TABLET: 5; 325 TABLET ORAL at 02:10

## 2020-10-11 RX ADMIN — Medication 10 ML: at 12:10

## 2020-10-11 RX ADMIN — ACETAMINOPHEN 650 MG: 325 TABLET ORAL at 07:10

## 2020-10-11 RX ADMIN — CEFTRIAXONE 1 G: 1 INJECTION, SOLUTION INTRAVENOUS at 05:10

## 2020-10-11 NOTE — NURSING
Received handoff from DOMONIQUE Watts LPN. Pt resting comfortably at bedside, RR even and unlabored, with no signs of distress. Telemetry monitor in place. Saline lock in place to site is clear. Pt safety maintained with bed low side rails up x2 with nurse call bell within reach

## 2020-10-11 NOTE — PROGRESS NOTES
[facility] Hospital Medicine  Progress Note    Patient Name: Karen Mccall  MRN: [id number]  Patient Class: IP- Inpatient   Admission Date: 10/9/2020  Length of Stay: 2 days  Attending Physician: Silvia Greco MD  Primary Care Provider: Lyndon Garcia MD        Subjective:     Principal Problem:Sepsis        HPI:    Karen Mccall is a 35 y.o. female that (in part)  has a past medical history of Anxiety, Asthma, GERD (gastroesophageal reflux disease), Mental disorder, Methamphetamine abuse, Migraine headache, Smoker, Suicidal ideation, Umbilical hernia, and Vaginal delivery.  has a past surgical history that includes Hysteroscopy; Dilation and curettage of uterus; Tubal ligation; and Vaginal delivery. Presents to [facility] Emergency Department initially complaining of chest pain, abdominal pain, nausea vomiting, and diffuse all throughout just.  Upon further questioning patient admits to taking methamphetamine and taking 6 tablets of ecstasy over the weekend.  Mother reported a and episode of seizure-like activity and near-syncope.  Was previously taking Trileptal and topiramate but admits noncompliance with home medication regimen.  Patient took antiemetics including Zofran prior to arrival but still complains of nausea with vomiting.    In the emergency department patient appeared to be having a withdrawal syndrome.  Routine laboratory studies, urinalysis, toxicology screen was obtained.  There is evidence of acute renal failure and sepsis with pyelonephritis.  Neutrophilic leukocytosis present.  Zosyn ceftriaxone given after cultures were obtained.  Toxicology screen positive for marijuana and methamphetamines.  Low-dose of benzodiazepine was given along with IV fluids.  Nephrology was consulted.    Hospital medicine has been asked to admit to inpatient for further evaluation and treatment.     Overview/Hospital Course:  pateint is admitted for ARF,N/V,acute  pyelonephritis,sepsis,,on IVF and CRT is improving,adjusted IVF,nephrology is following,her N/V is improved.advancing diet,urine culture growing Ecoli,on IV Rocephin,will monitor.she feel much better at this time,  leucocytosis is improving,tolerating diet.    Interval History:fever is improved,leucocytosis is improved.    Review of Systems   Constitutional: Positive for activity change, appetite change and fever.   Eyes: Negative for discharge and itching.   Gastrointestinal: Positive for abdominal pain.   Genitourinary: Positive for dysuria.   Musculoskeletal: Negative for arthralgias and back pain.   Neurological: Negative for dizziness and facial asymmetry.     Objective:     Vital Signs (Most Recent):  Temp: 98.5 °F (36.9 °C) (10/11/20 0453)  Pulse: 88 (10/11/20 0453)  Resp: 18 (10/11/20 0453)  BP: 134/83 (10/11/20 0453)  SpO2: 100 % (10/11/20 0453) Vital Signs (24h Range):  Temp:  [97.8 °F (36.6 °C)-100.8 °F (38.2 °C)] 98.5 °F (36.9 °C)  Pulse:  [] 88  Resp:  [18-20] 18  SpO2:  [98 %-100 %] 100 %  BP: (102-136)/(58-83) 134/83     Weight: 60 kg (132 lb 4.4 oz)  Body mass index is 23.43 kg/m².    Intake/Output Summary (Last 24 hours) at 10/11/2020 0656  Last data filed at 10/11/2020 0500  Gross per 24 hour   Intake 1248 ml   Output 2670 ml   Net -1422 ml      Physical Exam  HENT:      Head: Normocephalic.   Eyes:      Pupils: Pupils are equal, round, and reactive to light.   Cardiovascular:      Rate and Rhythm: Normal rate and regular rhythm.   Abdominal:      Palpations: Abdomen is soft.      Tenderness: There is abdominal tenderness.   Musculoskeletal:         General: No swelling or deformity.   Neurological:      Mental Status: She is alert and oriented to person, place, and time.         Significant Labs:   BMP:   Recent Labs   Lab 10/10/20  0519 10/10/20  0708   GLU  --  89   NA  --  135*   K  --  3.6   CL  --  105   CO2  --  17*   BUN  --  35*   CREATININE  --  2.6*   CALCIUM  --  7.6*   MG 1.8  --       CBC:   Recent Labs   Lab 10/09/20  1850 10/10/20  0708 10/11/20  0459   WBC 20.89* 12.28 10.49   HGB 14.2 9.8* 8.9*   HCT 41.6 29.0* 26.2*    130* 138*     CMP:   Recent Labs   Lab 10/09/20  1850 10/10/20  0708   * 135*   K 3.7 3.6   CL 91* 105   CO2 19* 17*   GLU 95 89   BUN 45* 35*   CREATININE 4.2* 2.6*   CALCIUM 9.3 7.6*   PROT 8.1 5.5*   ALBUMIN 3.8 2.5*   BILITOT 1.0 0.6   ALKPHOS 83 58   AST 20 15   ALT 20 14   ANIONGAP 21* 13   EGFRNONAA 13* 23*       Significant Imaging: reviewed.      Assessment/Plan:      * Sepsis  With fever ,leucocytosis,tachycardia,duo to acute pyelonephritis,on IVF and IV Abx with improvement.      Metabolic acidosis  Duo to sepsis,on bicarb drip.      Acute pyelonephritis  Per imaging and clinical presentation with CVA tenderness,positive urine culture with Ecoli,no sign of abscess on imaging,will continue with IV Abx,follow up with blood culture.      Acute renal failure  Duo to sepsis,nausea,vomiting with dehydration,on IVF with improvement,adjusted IVF,improving.      Marijuana abuse  Has been consulted avoid illicit drug use.      Methamphetamine abuse  Has been consulted avoid illicit drug use.      Tobacco use disorder complicating pregnancy, childbirth, or the puerperium, antepartum condition or complication  Has been consulted over 3 minutes need quit smoking.        VTE Risk Mitigation (From admission, onward)         Ordered     heparin (porcine) injection 5,000 Units  Every 8 hours      10/10/20 0454     IP VTE HIGH RISK PATIENT  Once      10/10/20 0454                Discharge Planning   LISA:      Code Status: Full Code   Is the patient medically ready for discharge?:     Reason for patient still in hospital (select all that apply): Patient trending condition  Discharge Plan A: Home with family                  Silvia Greco MD  Department of Hospital Medicine   [facility]

## 2020-10-11 NOTE — ASSESSMENT & PLAN NOTE
With fever ,leucocytosis,tachycardia,duo to acute pyelonephritis,on IVF and IV Abx with improvement.

## 2020-10-11 NOTE — PROGRESS NOTES
Karen Mccall is a 35 y.o. female patient.    Follow for FRANCISCA    No new c/o, feeling better  Comfortable    Scheduled Meds:   busPIRone  15 mg Oral TID    cefTRIAXone (ROCEPHIN) IVPB  1 g Intravenous Q12H    heparin (porcine)  5,000 Units Subcutaneous Q8H    mupirocin   Nasal BID    senna-docusate 8.6-50 mg  1 tablet Oral Daily    sodium chloride 0.9%  10 mL Intravenous Q6H       Review of patient's allergies indicates:  No Known Allergies      Vital Signs Range (Last 24H):  Temp:  [97.8 °F (36.6 °C)-100.9 °F (38.3 °C)]   Pulse:  []   Resp:  [18-20]   BP: (120-136)/(58-83)   SpO2:  [98 %-100 %]     I & O (Last 24H):    Intake/Output Summary (Last 24 hours) at 10/11/2020 1028  Last data filed at 10/11/2020 0500  Gross per 24 hour   Intake 540 ml   Output 2320 ml   Net -1780 ml           Physical Exam:  General appearance: well developed, well nourished, no distress  Lungs:  clear to auscultation bilaterally and normal respiratory effort  Heart: regular rate and rhythm  Abdomen: soft, non-tender non-distented; bowel sounds normal; no masses,  no organomegaly  Extremities: no cyanosis or edema, or clubbing    Laboratory:  CBC:   Recent Labs   Lab 10/11/20  0459   WBC 10.49   RBC 3.07*   HGB 8.9*   HCT 26.2*   *   MCV 85   MCH 29.0   MCHC 34.0     CMP:   Recent Labs   Lab 10/11/20  0459   *  240*   CALCIUM 7.7*  7.7*   ALBUMIN 2.2*  2.2*   PROT 5.1*  5.1*     136   K 3.2*  3.2*   CO2 24  24   CL 99  99   BUN 19  19   CREATININE 1.6*  1.6*   ALKPHOS 121  121   ALT 24  24   AST 27  27   BILITOT 0.7  0.7       Imp/Plan    FRANCISCA - improving  Pyelonephritis  Sepsis - improving  Polysubstance abuse    Continue present Rx  Replace K  CMP in am    Trac T Le  10/11/2020

## 2020-10-11 NOTE — HOSPITAL COURSE
pateint was admitted for ARF,N/V,acute pyelonephritis,sepsis,,was started on IVF Abx and aggressive  IVF ,nephrology was consulted,and CRT is gradually improved,US and CT show no sign of abscess,renal stone or obstruction,,ARF  is resolved,,adjusted IVF,,her N/V is improved.started gradually on diet,advanced  diet,urine culture growEcoli,was on IV Rocephin,blod culture was negative,  leucocytosis is resolved,was tolerating diet.  Patient was discharged home with Cipro and follow up with PCP in next few days.

## 2020-10-11 NOTE — ASSESSMENT & PLAN NOTE
Per imaging and clinical presentation with CVA tenderness,positive urine culture with Ecoli,no sign of abscess on imaging,will continue with IV Abx,follow up with blood culture.

## 2020-10-11 NOTE — PLAN OF CARE
Problem: Adult Inpatient Plan of Care  Goal: Plan of Care Review  Outcome: Ongoing, Progressing  Goal: Patient-Specific Goal (Individualization)  Outcome: Ongoing, Progressing  Goal: Absence of Hospital-Acquired Illness or Injury  Outcome: Ongoing, Progressing  Intervention: Identify and Manage Fall Risk  Flowsheets (Taken 10/11/2020 1801)  Safety Promotion/Fall Prevention:   assistive device/personal item within reach   side rails raised x 2  Intervention: Prevent VTE (venous thromboembolism)  Flowsheets (Taken 10/11/2020 1801)  VTE Prevention/Management:   ROM (active) performed   ambulation promoted   intravenous hydration  Goal: Optimal Comfort and Wellbeing  Outcome: Ongoing, Progressing  Intervention: Provide Person-Centered Care  Flowsheets (Taken 10/11/2020 1801)  Trust Relationship/Rapport:   care explained   questions answered   empathic listening provided  Goal: Readiness for Transition of Care  Outcome: Ongoing, Progressing  Goal: Rounds/Family Conference  Outcome: Ongoing, Progressing     Problem: Adjustment to Illness (Sepsis/Septic Shock)  Goal: Optimal Coping  Outcome: Ongoing, Progressing  Intervention: Optimize Psychosocial Adjustment to Illness  Flowsheets (Taken 10/11/2020 1801)  Supportive Measures: active listening utilized     Problem: Bleeding (Sepsis/Septic Shock)  Goal: Absence of Bleeding  Outcome: Ongoing, Progressing     Problem: Infection (Sepsis/Septic Shock)  Goal: Absence of Infection Signs/Symptoms  Outcome: Ongoing, Progressing  Intervention: Prevent or Manage Infection Progression  Flowsheets (Taken 10/11/2020 1801)  Fever Reduction/Comfort Measures: medication administered     Problem: Fluid Imbalance (Acute Kidney Injury/Impairment)  Goal: Optimal Fluid Balance  Outcome: Ongoing, Progressing     Problem: Fall Injury Risk  Goal: Absence of Fall and Fall-Related Injury  Outcome: Ongoing, Progressing

## 2020-10-11 NOTE — PLAN OF CARE
Problem: Adult Inpatient Plan of Care  Goal: Plan of Care Review  Outcome: Ongoing, Progressing  Goal: Patient-Specific Goal (Individualization)  Outcome: Ongoing, Progressing  Goal: Absence of Hospital-Acquired Illness or Injury  Outcome: Ongoing, Progressing  Goal: Optimal Comfort and Wellbeing  Outcome: Ongoing, Progressing  Goal: Readiness for Transition of Care  Outcome: Ongoing, Progressing  Goal: Rounds/Family Conference  Outcome: Ongoing, Progressing     Problem: Adjustment to Illness (Sepsis/Septic Shock)  Goal: Optimal Coping  Outcome: Ongoing, Progressing     Problem: Bleeding (Sepsis/Septic Shock)  Goal: Absence of Bleeding  Outcome: Ongoing, Progressing     Problem: Glycemic Control Impaired (Sepsis/Septic Shock)  Goal: Blood Glucose Level Within Desired Range  Outcome: Ongoing, Progressing     Problem: Hemodynamic Instability (Sepsis/Septic Shock)  Goal: Effective Tissue Perfusion  Outcome: Ongoing, Progressing     Problem: Infection (Sepsis/Septic Shock)  Goal: Absence of Infection Signs/Symptoms  Outcome: Ongoing, Progressing     Problem: Nutrition Impaired (Sepsis/Septic Shock)  Goal: Optimal Nutrition Intake  Outcome: Ongoing, Progressing     Problem: Respiratory Compromise (Sepsis/Septic Shock)  Goal: Effective Oxygenation and Ventilation  Outcome: Ongoing, Progressing     Problem: Infection  Goal: Infection Symptom Resolution  Outcome: Ongoing, Progressing     Problem: Electrolyte Imbalance (Acute Kidney Injury/Impairment)  Goal: Serum Electrolyte Balance  Outcome: Ongoing, Progressing     Problem: Fluid Imbalance (Acute Kidney Injury/Impairment)  Goal: Optimal Fluid Balance  Outcome: Ongoing, Progressing     Problem: Hematologic Alteration (Acute Kidney Injury/Impairment)  Goal: Hemoglobin, Hematocrit and Platelets Within Normal Range  Outcome: Ongoing, Progressing     Problem: Oral Intake Inadequate (Acute Kidney Injury/Impairment)  Goal: Optimal Nutrition Intake  Outcome: Ongoing,  Progressing     Problem: Renal Function Impairment (Acute Kidney Injury/Impairment)  Goal: Effective Renal Function  Outcome: Ongoing, Progressing     Problem: Fall Injury Risk  Goal: Absence of Fall and Fall-Related Injury  Outcome: Ongoing, Progressing     Problem: Skin Injury Risk Increased  Goal: Skin Health and Integrity  Outcome: Ongoing, Progressing     Pt VS remained stable and free of falls this shift. Pt was NS to ST on monitor. During the shift pt had c/o right flank pain 7/10 which decreased to 3/10 after pain med administered.

## 2020-10-11 NOTE — SUBJECTIVE & OBJECTIVE
Interval History:fever is improved,leucocytosis is improved.    Review of Systems   Constitutional: Positive for activity change, appetite change and fever.   Eyes: Negative for discharge and itching.   Gastrointestinal: Positive for abdominal pain.   Genitourinary: Positive for dysuria.   Musculoskeletal: Negative for arthralgias and back pain.   Neurological: Negative for dizziness and facial asymmetry.     Objective:     Vital Signs (Most Recent):  Temp: 98.5 °F (36.9 °C) (10/11/20 0453)  Pulse: 88 (10/11/20 0453)  Resp: 18 (10/11/20 0453)  BP: 134/83 (10/11/20 0453)  SpO2: 100 % (10/11/20 0453) Vital Signs (24h Range):  Temp:  [97.8 °F (36.6 °C)-100.8 °F (38.2 °C)] 98.5 °F (36.9 °C)  Pulse:  [] 88  Resp:  [18-20] 18  SpO2:  [98 %-100 %] 100 %  BP: (102-136)/(58-83) 134/83     Weight: 60 kg (132 lb 4.4 oz)  Body mass index is 23.43 kg/m².    Intake/Output Summary (Last 24 hours) at 10/11/2020 0656  Last data filed at 10/11/2020 0500  Gross per 24 hour   Intake 1248 ml   Output 2670 ml   Net -1422 ml      Physical Exam  HENT:      Head: Normocephalic.   Eyes:      Pupils: Pupils are equal, round, and reactive to light.   Cardiovascular:      Rate and Rhythm: Normal rate and regular rhythm.   Abdominal:      Palpations: Abdomen is soft.      Tenderness: There is abdominal tenderness.   Musculoskeletal:         General: No swelling or deformity.   Neurological:      Mental Status: She is alert and oriented to person, place, and time.         Significant Labs:   BMP:   Recent Labs   Lab 10/10/20  0519 10/10/20  0708   GLU  --  89   NA  --  135*   K  --  3.6   CL  --  105   CO2  --  17*   BUN  --  35*   CREATININE  --  2.6*   CALCIUM  --  7.6*   MG 1.8  --      CBC:   Recent Labs   Lab 10/09/20  1850 10/10/20  0708 10/11/20  0459   WBC 20.89* 12.28 10.49   HGB 14.2 9.8* 8.9*   HCT 41.6 29.0* 26.2*    130* 138*     CMP:   Recent Labs   Lab 10/09/20  1850 10/10/20  0708   * 135*   K 3.7 3.6   CL 91*  105   CO2 19* 17*   GLU 95 89   BUN 45* 35*   CREATININE 4.2* 2.6*   CALCIUM 9.3 7.6*   PROT 8.1 5.5*   ALBUMIN 3.8 2.5*   BILITOT 1.0 0.6   ALKPHOS 83 58   AST 20 15   ALT 20 14   ANIONGAP 21* 13   EGFRNONAA 13* 23*       Significant Imaging: reviewed.

## 2020-10-11 NOTE — NURSING
Entered pt room and pt stated she had a sharp pain on her right side. Pt had tears rolling down her face d/t pain. Assisted pt to BSC and pt was able to urinate. She stated urination helped to ease pain, but it is still present. Pain pill will be given and pain w/ be re-assesed

## 2020-10-12 VITALS
TEMPERATURE: 100 F | SYSTOLIC BLOOD PRESSURE: 165 MMHG | OXYGEN SATURATION: 98 % | DIASTOLIC BLOOD PRESSURE: 84 MMHG | WEIGHT: 132.25 LBS | HEIGHT: 63 IN | RESPIRATION RATE: 18 BRPM | BODY MASS INDEX: 23.43 KG/M2 | HEART RATE: 76 BPM

## 2020-10-12 LAB
ALBUMIN SERPL BCP-MCNC: 2.1 G/DL (ref 3.5–5.2)
ALBUMIN SERPL BCP-MCNC: 2.1 G/DL (ref 3.5–5.2)
ALP SERPL-CCNC: 132 U/L (ref 55–135)
ALP SERPL-CCNC: 132 U/L (ref 55–135)
ALT SERPL W/O P-5'-P-CCNC: 21 U/L (ref 10–44)
ALT SERPL W/O P-5'-P-CCNC: 21 U/L (ref 10–44)
ANION GAP SERPL CALC-SCNC: 8 MMOL/L (ref 8–16)
ANION GAP SERPL CALC-SCNC: 8 MMOL/L (ref 8–16)
AST SERPL-CCNC: 15 U/L (ref 10–40)
AST SERPL-CCNC: 15 U/L (ref 10–40)
BASOPHILS # BLD AUTO: 0.02 K/UL (ref 0–0.2)
BASOPHILS NFR BLD: 0.3 % (ref 0–1.9)
BILIRUB SERPL-MCNC: 0.7 MG/DL (ref 0.1–1)
BILIRUB SERPL-MCNC: 0.7 MG/DL (ref 0.1–1)
BUN SERPL-MCNC: 11 MG/DL (ref 6–20)
BUN SERPL-MCNC: 11 MG/DL (ref 6–20)
CALCIUM SERPL-MCNC: 7.9 MG/DL (ref 8.7–10.5)
CALCIUM SERPL-MCNC: 7.9 MG/DL (ref 8.7–10.5)
CHLORIDE SERPL-SCNC: 109 MMOL/L (ref 95–110)
CHLORIDE SERPL-SCNC: 109 MMOL/L (ref 95–110)
CO2 SERPL-SCNC: 23 MMOL/L (ref 23–29)
CO2 SERPL-SCNC: 23 MMOL/L (ref 23–29)
CREAT SERPL-MCNC: 1 MG/DL (ref 0.5–1.4)
CREAT SERPL-MCNC: 1 MG/DL (ref 0.5–1.4)
DIFFERENTIAL METHOD: ABNORMAL
EOSINOPHIL # BLD AUTO: 0 K/UL (ref 0–0.5)
EOSINOPHIL NFR BLD: 0.5 % (ref 0–8)
ERYTHROCYTE [DISTWIDTH] IN BLOOD BY AUTOMATED COUNT: 13.5 % (ref 11.5–14.5)
EST. GFR  (AFRICAN AMERICAN): >60 ML/MIN/1.73 M^2
EST. GFR  (AFRICAN AMERICAN): >60 ML/MIN/1.73 M^2
EST. GFR  (NON AFRICAN AMERICAN): >60 ML/MIN/1.73 M^2
EST. GFR  (NON AFRICAN AMERICAN): >60 ML/MIN/1.73 M^2
GLUCOSE SERPL-MCNC: 94 MG/DL (ref 70–110)
GLUCOSE SERPL-MCNC: 94 MG/DL (ref 70–110)
HAV IGM SERPL QL IA: NEGATIVE
HBV CORE IGM SERPL QL IA: NEGATIVE
HBV SURFACE AG SERPL QL IA: NEGATIVE
HCT VFR BLD AUTO: 27.8 % (ref 37–48.5)
HCV AB SERPL QL IA: NEGATIVE
HGB BLD-MCNC: 9.1 G/DL (ref 12–16)
IMM GRANULOCYTES # BLD AUTO: 0.04 K/UL (ref 0–0.04)
IMM GRANULOCYTES NFR BLD AUTO: 0.6 % (ref 0–0.5)
LYMPHOCYTES # BLD AUTO: 0.8 K/UL (ref 1–4.8)
LYMPHOCYTES NFR BLD: 11.6 % (ref 18–48)
MCH RBC QN AUTO: 28.4 PG (ref 27–31)
MCHC RBC AUTO-ENTMCNC: 32.7 G/DL (ref 32–36)
MCV RBC AUTO: 87 FL (ref 82–98)
MONOCYTES # BLD AUTO: 0.7 K/UL (ref 0.3–1)
MONOCYTES NFR BLD: 10.8 % (ref 4–15)
NEUTROPHILS # BLD AUTO: 5.1 K/UL (ref 1.8–7.7)
NEUTROPHILS NFR BLD: 76.2 % (ref 38–73)
NRBC BLD-RTO: 0 /100 WBC
PLATELET # BLD AUTO: 159 K/UL (ref 150–350)
PMV BLD AUTO: 11 FL (ref 9.2–12.9)
POTASSIUM SERPL-SCNC: 3.7 MMOL/L (ref 3.5–5.1)
POTASSIUM SERPL-SCNC: 3.7 MMOL/L (ref 3.5–5.1)
PROT SERPL-MCNC: 5.2 G/DL (ref 6–8.4)
PROT SERPL-MCNC: 5.2 G/DL (ref 6–8.4)
RBC # BLD AUTO: 3.2 M/UL (ref 4–5.4)
SODIUM SERPL-SCNC: 140 MMOL/L (ref 136–145)
SODIUM SERPL-SCNC: 140 MMOL/L (ref 136–145)
WBC # BLD AUTO: 6.65 K/UL (ref 3.9–12.7)

## 2020-10-12 PROCEDURE — 36415 COLL VENOUS BLD VENIPUNCTURE: CPT

## 2020-10-12 PROCEDURE — 99900035 HC TECH TIME PER 15 MIN (STAT)

## 2020-10-12 PROCEDURE — 85025 COMPLETE CBC W/AUTO DIFF WBC: CPT

## 2020-10-12 PROCEDURE — 80053 COMPREHEN METABOLIC PANEL: CPT

## 2020-10-12 PROCEDURE — 25000003 PHARM REV CODE 250: Performed by: HOSPITALIST

## 2020-10-12 PROCEDURE — 63600175 PHARM REV CODE 636 W HCPCS: Performed by: HOSPITALIST

## 2020-10-12 PROCEDURE — A4216 STERILE WATER/SALINE, 10 ML: HCPCS | Performed by: HOSPITALIST

## 2020-10-12 PROCEDURE — 94761 N-INVAS EAR/PLS OXIMETRY MLT: CPT

## 2020-10-12 PROCEDURE — 94799 UNLISTED PULMONARY SVC/PX: CPT

## 2020-10-12 RX ORDER — METOPROLOL TARTRATE 25 MG/1
25 TABLET, FILM COATED ORAL 2 TIMES DAILY
Status: DISCONTINUED | OUTPATIENT
Start: 2020-10-12 | End: 2020-10-12 | Stop reason: HOSPADM

## 2020-10-12 RX ORDER — CIPROFLOXACIN 500 MG/1
500 TABLET ORAL EVERY 12 HOURS
Qty: 20 TABLET | Refills: 0 | Status: SHIPPED | OUTPATIENT
Start: 2020-10-12 | End: 2020-10-22

## 2020-10-12 RX ORDER — CLONIDINE HYDROCHLORIDE 0.1 MG/1
0.1 TABLET ORAL EVERY 4 HOURS PRN
Status: DISCONTINUED | OUTPATIENT
Start: 2020-10-12 | End: 2020-10-12 | Stop reason: HOSPADM

## 2020-10-12 RX ADMIN — Medication 10 ML: at 06:10

## 2020-10-12 RX ADMIN — HEPARIN SODIUM 5000 UNITS: 5000 INJECTION INTRAVENOUS; SUBCUTANEOUS at 06:10

## 2020-10-12 RX ADMIN — HYDROCODONE BITARTRATE AND ACETAMINOPHEN 1 TABLET: 5; 325 TABLET ORAL at 09:10

## 2020-10-12 RX ADMIN — SODIUM CHLORIDE: 0.9 INJECTION, SOLUTION INTRAVENOUS at 04:10

## 2020-10-12 RX ADMIN — ACETAMINOPHEN 650 MG: 325 TABLET ORAL at 12:10

## 2020-10-12 RX ADMIN — BUSPIRONE HYDROCHLORIDE 15 MG: 10 TABLET ORAL at 09:10

## 2020-10-12 RX ADMIN — DOCUSATE SODIUM 50 MG AND SENNOSIDES 8.6 MG 1 TABLET: 8.6; 5 TABLET, FILM COATED ORAL at 09:10

## 2020-10-12 RX ADMIN — Medication 10 ML: at 12:10

## 2020-10-12 RX ADMIN — MUPIROCIN: 20 OINTMENT TOPICAL at 09:10

## 2020-10-12 RX ADMIN — CEFTRIAXONE 1 G: 1 INJECTION, SOLUTION INTRAVENOUS at 06:10

## 2020-10-12 RX ADMIN — METOPROLOL TARTRATE 25 MG: 25 TABLET, FILM COATED ORAL at 09:10

## 2020-10-12 NOTE — NURSING
Patient awake and alert no distress. Skin intact with multiple tattoos.  SL intact no redness no swelling.  Plan of care reviewed.Bed low call light in reach. AM assessment completed.

## 2020-10-12 NOTE — DISCHARGE SUMMARY
[facility] Hospital Medicine  Discharge Summary      Patient Name: Karen Mccall  MRN: [id number]  Admission Date: 10/9/2020  Hospital Length of Stay: 3 days  Discharge Date and Time:  10/12/2020 10:12 AM  Attending Physician: Silvia Greco MD   Discharging Provider: Silvia Greco MD  Primary Care Provider: Lyndon Garcia MD      HPI:     Karen Mccall is a 35 y.o. female that (in part)  has a past medical history of Anxiety, Asthma, GERD (gastroesophageal reflux disease), Mental disorder, Methamphetamine abuse, Migraine headache, Smoker, Suicidal ideation, Umbilical hernia, and Vaginal delivery.  has a past surgical history that includes Hysteroscopy; Dilation and curettage of uterus; Tubal ligation; and Vaginal delivery. Presents to [facility] Emergency Department initially complaining of chest pain, abdominal pain, nausea vomiting, and diffuse all throughout just.  Upon further questioning patient admits to taking methamphetamine and taking 6 tablets of ecstasy over the weekend.  Mother reported a and episode of seizure-like activity and near-syncope.  Was previously taking Trileptal and topiramate but admits noncompliance with home medication regimen.  Patient took antiemetics including Zofran prior to arrival but still complains of nausea with vomiting.    In the emergency department patient appeared to be having a withdrawal syndrome.  Routine laboratory studies, urinalysis, toxicology screen was obtained.  There is evidence of acute renal failure and sepsis with pyelonephritis.  Neutrophilic leukocytosis present.  Zosyn ceftriaxone given after cultures were obtained.  Toxicology screen positive for marijuana and methamphetamines.  Low-dose of benzodiazepine was given along with IV fluids.  Nephrology was consulted.    Hospital medicine has been asked to admit to inpatient for further evaluation and treatment.     * No surgery found *      Hospital Course:    seniaeinrocco was admitted for ARF,N/V,acute pyelonephritis,sepsis,,was started on IVF Abx and aggressive  IVF ,nephrology was consulted,and CRT is gradually improved,US and CT show no sign of abscess,renal stone or obstruction,,ARF  is resolved,,adjusted IVF,,her N/V is improved.started gradually on diet,advanced  diet,urine culture growEcoli,was on IV Rocephin,blod culture was negative,  leucocytosis is resolved,was tolerating diet.  Patient was discharged home with Cipro and follow up with PCP in next few days.     Consults:   Consults (From admission, onward)        Status Ordering Provider     Inpatient consult to Nephrology  Once     Provider:  Cely Herrera MD    Completed BRYANT BUCKNER     Inpatient consult to PICC team ([facility])  Once     Provider:  (Not yet assigned)    JOSE Cuevas          No new Assessment & Plan notes have been filed under this hospital service since the last note was generated.  Service: Hospital Medicine    Final Active Diagnoses:    Diagnosis Date Noted POA    PRINCIPAL PROBLEM:  Sepsis [A41.9] 10/09/2020 Yes    Acute pyelonephritis [N10] 10/11/2020 Yes    Metabolic acidosis [E87.2] 10/11/2020 Yes    Methamphetamine abuse [F15.10]  Yes    Marijuana abuse [F12.10]  Yes    Acute renal failure [N17.9]  Yes    Tobacco use disorder complicating pregnancy, childbirth, or the puerperium, antepartum condition or complication [O99.330] 07/24/2012 Yes      Problems Resolved During this Admission:       Discharged Condition: stable    Disposition: Home or Self Care    Follow Up:  Follow-up Information     Lyndon Garcia MD On 11/13/2020.    Specialty: Family Medicine  Why: @12:00pm, for Hospital Follow up  Contact information:  [address] [facility] [address] [phone]                 Patient Instructions:      Activity as tolerated       Significant Diagnostic Studies: Labs:   BMP:   Recent Labs   Lab 10/11/20  0459 10/12/20  0447   *  240* 94  94      136 140  140   K 3.2*  3.2* 3.7  3.7   CL 99  99 109  109   CO2 24  24 23  23   BUN 19  19 11  11   CREATININE 1.6*  1.6* 1.0  1.0   CALCIUM 7.7*  7.7* 7.9*  7.9*   , CMP   Recent Labs   Lab 10/11/20  0459 10/12/20  0447     136 140  140   K 3.2*  3.2* 3.7  3.7   CL 99  99 109  109   CO2 24  24 23  23   *  240* 94  94   BUN 19  19 11  11   CREATININE 1.6*  1.6* 1.0  1.0   CALCIUM 7.7*  7.7* 7.9*  7.9*   PROT 5.1*  5.1* 5.2*  5.2*   ALBUMIN 2.2*  2.2* 2.1*  2.1*   BILITOT 0.7  0.7 0.7  0.7   ALKPHOS 121  121 132  132   AST 27  27 15  15   ALT 24  24 21  21   ANIONGAP 13  13 8  8   ESTGFRAFRICA 48*  48* >60  >60   EGFRNONAA 41*  41* >60  >60    and CBC   Recent Labs   Lab 10/11/20  0459 10/12/20  0447   WBC 10.49 6.65   HGB 8.9* 9.1*   HCT 26.2* 27.8*   * 159     Microbiology:   Blood Culture   Lab Results   Component Value Date    LABBLOO No Growth to date 10/09/2020    LABBLOO No Growth to date 10/09/2020    LABBLOO No Growth to date 10/09/2020    and Urine Culture    Lab Results   Component Value Date    LABURIN ESCHERICHIA COLI  >100,000 cfu/ml   (A) 10/09/2020     Radiology: X-Ray: CXR: X-Ray Chest 1 View (CXR):   Results for orders placed or performed during the hospital encounter of 10/09/20   X-Ray Chest 1 View for PICC_Central line    Narrative    EXAMINATION:  XR CHEST 1 VIEW    CLINICAL HISTORY:  Evaluate PICC line placement;    TECHNIQUE:  Single frontal view of the chest was performed.    COMPARISON:  10/09/2020.    FINDINGS:  Right-sided PICC line is visualized with distal tip over the SVC.  Heart is normal in size.  Lungs are clear and symmetrically expanded.  No pneumothorax.      Impression    As above.      Electronically signed by: Shay Mercado MD  Date:    10/10/2020  Time:    00:45    and X-Ray Chest PA and Lateral (CXR): No results found for this visit on 10/09/20.  CT scan: CT ABDOMEN PELVIS WITH CONTRAST: No results  found for this visit on 10/09/20. and CT ABDOMEN PELVIS WITHOUT CONTRAST: No results found for this visit on 10/09/20.  Ultrasound:Pelvic     Pending Diagnostic Studies:     Procedure Component Value Units Date/Time    Hepatitis panel, acute [[id number]] Collected: 10/10/20 0708    Order Status: Sent Lab Status: In process Updated: 10/10/20 1636    Specimen: Blood     Narrative:      Recoll. [id number] by [facility] at 10/10/2020 06:15, reason: Possible IV   fluid contamination. Called to Corry Bautista    Oxcarbazepine level [[id number]] Collected: 10/09/20 1850    Order Status: Sent Lab Status: In process Updated: 10/09/20 1905    Specimen: Blood     Topiramate level [[id number]] Collected: 10/09/20 1850    Order Status: Sent Lab Status: In process Updated: 10/09/20 1905    Specimen: Blood          Medications:  Reconciled Home Medications:      Medication List      START taking these medications    ciprofloxacin HCl 500 MG tablet  Commonly known as: CIPRO  Take 1 tablet (500 mg total) by mouth every 12 (twelve) hours. for 10 days        CONTINUE taking these medications    busPIRone 15 MG tablet  Commonly known as: BUSPAR  Take 15 mg by mouth 3 (three) times daily.     clonazePAM 0.5 MG tablet  Commonly known as: KLONOPIN  Take 0.5 mg by mouth 2 (two) times daily as needed for Anxiety.     fluticasone propionate 50 mcg/actuation nasal spray  Commonly known as: FLONASE  1-2 sprays ( mcg total) by Each Nostril route once daily.     gabapentin 300 MG capsule  Commonly known as: NEURONTIN  Take 1 capsule (300 mg total) by mouth 2 (two) times daily.     loratadine 10 mg tablet  Commonly known as: CLARITIN  TAKE ONE TABLET BY MOUTH EVERY DAY     meclizine 25 mg tablet  Commonly known as: ANTIVERT  Take 1 tablet (25 mg total) by mouth 3 (three) times daily as needed for Dizziness.     metoprolol tartrate 25 MG tablet  Commonly known as: LOPRESSOR  Take 25 mg by mouth 2 (two) times daily.     omeprazole 40 MG  capsule  Commonly known as: PRILOSEC  Take 1 capsule (40 mg total) by mouth once daily.     OXcarbazepine 600 MG Tab  Commonly known as: TRILEPTAL  Take 1 tablet (600 mg total) by mouth 2 (two) times daily.     paroxetine 20 MG tablet  Commonly known as: PAXIL  Take 1 tablet (20 mg total) by mouth every morning.     sumatriptan 50 MG tablet  Commonly known as: IMITREX  Take 1 tablet (50 mg total) by mouth once. May take another tablet after 2 hours if the headache recurs. Maximum of 4 tablets a day. for 1 dose     topiramate 25 MG tablet  Commonly known as: TOPAMAX  TAKE 1 TABLET BY MOUTH TWICE A DAY        STOP taking these medications    butalbital-acetaminophen-caffeine -40 mg -40 mg per tablet  Commonly known as: FIORICET, ESGIC     meloxicam 15 MG tablet  Commonly known as: MOBIC     traMADoL 50 mg tablet  Commonly known as: ULTRAM            Indwelling Lines/Drains at time of discharge:   Lines/Drains/Airways     Peripherally Inserted Central Catheter Line            PICC Double Lumen 10/10/20 0035 right basilic 2 days                Time spent on the discharge of patient: over 30  minutes  Patient was seen and examined on the date of discharge and determined to be suitable for discharge.         Silvia Greco MD  Department of Hospital Medicine  [facility]

## 2020-10-12 NOTE — PLAN OF CARE
Problem: Adult Inpatient Plan of Care  Goal: Plan of Care Review  Outcome: Ongoing, Progressing  Goal: Patient-Specific Goal (Individualization)  Outcome: Ongoing, Progressing  Goal: Absence of Hospital-Acquired Illness or Injury  Outcome: Ongoing, Progressing  Goal: Optimal Comfort and Wellbeing  Outcome: Ongoing, Progressing  Goal: Readiness for Transition of Care  Outcome: Ongoing, Progressing  Goal: Rounds/Family Conference  Outcome: Ongoing, Progressing     Problem: Adjustment to Illness (Sepsis/Septic Shock)  Goal: Optimal Coping  Outcome: Ongoing, Progressing     Problem: Bleeding (Sepsis/Septic Shock)  Goal: Absence of Bleeding  Outcome: Ongoing, Progressing     Problem: Glycemic Control Impaired (Sepsis/Septic Shock)  Goal: Blood Glucose Level Within Desired Range  Outcome: Ongoing, Progressing     Problem: Hemodynamic Instability (Sepsis/Septic Shock)  Goal: Effective Tissue Perfusion  Outcome: Ongoing, Progressing     Problem: Infection (Sepsis/Septic Shock)  Goal: Absence of Infection Signs/Symptoms  Outcome: Ongoing, Progressing     Problem: Nutrition Impaired (Sepsis/Septic Shock)  Goal: Optimal Nutrition Intake  Outcome: Ongoing, Progressing     Problem: Respiratory Compromise (Sepsis/Septic Shock)  Goal: Effective Oxygenation and Ventilation  Outcome: Ongoing, Progressing     Problem: Infection  Goal: Infection Symptom Resolution  Outcome: Ongoing, Progressing     Problem: Electrolyte Imbalance (Acute Kidney Injury/Impairment)  Goal: Serum Electrolyte Balance  Outcome: Ongoing, Progressing     Problem: Fluid Imbalance (Acute Kidney Injury/Impairment)  Goal: Optimal Fluid Balance  Outcome: Ongoing, Progressing     Problem: Hematologic Alteration (Acute Kidney Injury/Impairment)  Goal: Hemoglobin, Hematocrit and Platelets Within Normal Range  Outcome: Ongoing, Progressing     Problem: Oral Intake Inadequate (Acute Kidney Injury/Impairment)  Goal: Optimal Nutrition Intake  Outcome: Ongoing,  Progressing     Problem: Renal Function Impairment (Acute Kidney Injury/Impairment)  Goal: Effective Renal Function  Outcome: Ongoing, Progressing     Problem: Fall Injury Risk  Goal: Absence of Fall and Fall-Related Injury  Outcome: Ongoing, Progressing     Problem: Skin Injury Risk Increased  Goal: Skin Health and Integrity  Outcome: Ongoing, Progressing    Pt VS have been stable w/ minimal c/o pain during this shift. Pt had a temp of 103.1 that was treated w/ Tylenol and decreased to normal. Pt was SR on monitor and free of falls this shift and rested comfortably at bedside

## 2020-10-12 NOTE — NURSING
Patient discharged to home today. Patient given discharge instructions and medication record.Patient educated on all contents. Verbalized understanding of all instructions.PICC removed with difficultly.

## 2020-10-12 NOTE — PROGRESS NOTES
HOW TO MANAGE YOUR CARE  AT HOME:  TN taught Symptoms and Problems [facility] with pt and with teach back:  1. NAUSEA N VOMITING, 2.PAIN IN JOINTS. TN placed education sheet in [employer]..     HELP AT HOME TO ASSIST WITH PATIENT'S RECOVERY IS HEATHER/MOM.    Preference is for [facility] pharmacy on [address].      TN taught patient about things she is responsible for when discharged TO HELP WITH HER RECOVERY:   How to Manage Her Care At Home:  1. Getting her prescriptions filled.  2. Taking her medications as directed. DO NOT MISS ANY DOSES!  3. Going to her follow-up doctor appointments.   .  Lidia Bradford RN, BSN, STN CCM

## 2020-10-12 NOTE — PROGRESS NOTES
[facility] CASE MANAGEMENT                  WRITTEN DISCHARGE INFORMATION      APPOINTMENTS AND RESOURCES TO HELP YOU MANAGE YOUR CARE AT HOME BASED ON YOUR PREFERENCES:  (If an appointment is not scheduled for you when you leave the hospital, call your doctor to schedule a follow up visit within a week)    Follow-up Information     Lyndon Garcia MD On 11/13/2020.    Specialty: Family Medicine  Why: @12:00pm, for Hospital Follow up  Contact information:  [address] [facility] [place] [id number] [phone]                   Healthy Living Instructions to HELP MANAGE YOUR CARE AT HOME:  Things You are responsible for:  1.    Getting your prescriptions filled   2.    Taking your medications as directed, DO NOT MISS ANY DOSES!  3.    Following the diet and exercise recommended by your doctor  4.    Going to your follow-up doctor appointment. This is important because it allows the doctor to monitor your progress and determine if any changes need to made to your treatment plan.  5. If you have any questions about MANAGING YOUR CARE AT HOME Call the Nurse Care Line for 24/7 Assistance [phone]       Please answer any calls you may receive from [facility]. We want to continue to support you as you manage your healthcare needs. [facility] is happy to have the opportunity to serve you.      Thank you for choosing [facility] for your healthcare needs!  Your [facility] Case Management Team,

## 2020-10-12 NOTE — NURSING
Bedside report given to night nurse. Pt has no sign of distress. Continuous IV fluids. Safety precautions are maintained with bed alarm set, bed in lowest position, bed wheels locked, and call light in reach. Chart check completed.

## 2020-10-12 NOTE — PROGRESS NOTES
Awake alert oriented NAD    CO low grade temp and abd pain     Denies CNS ENT CP  RHEUM OR DERM SX  Past Medical History:   Diagnosis Date    Anxiety     Asthma     as a child, no recent episodes    GERD (gastroesophageal reflux disease)     Mental disorder     Methamphetamine abuse     Migraine headache     Smoker     Suicidal ideation     Umbilical hernia     Vaginal delivery     x4     Past Surgical History:   Procedure Laterality Date    DILATION AND CURETTAGE OF UTERUS      HYSTEROSCOPY      TUBAL LIGATION      VAGINAL DELIVERY      x 4     Review of patient's allergies indicates:  No Known Allergies    Current Facility-Administered Medications   Medication    acetaminophen tablet 650 mg    bisacodyL suppository 10 mg    busPIRone tablet 15 mg    cefTRIAXone (ROCEPHIN) 1 g/50 mL D5W IVPB    cloNIDine tablet 0.1 mg    heparin (porcine) injection 5,000 Units    HYDROcodone-acetaminophen 5-325 mg per tablet 1 tablet    influenza (QUADRIVALENT PF) vaccine 0.5 mL    melatonin tablet 9 mg    metoprolol tartrate (LOPRESSOR) tablet 25 mg    mineral oil enema 1 enema    morphine injection 5 mg    mupirocin 2 % ointment    ondansetron injection 8 mg    polyethylene glycol packet 17 g    promethazine suppository 25 mg    senna-docusate 8.6-50 mg per tablet 1 tablet    sodium chloride 0.9% flush 10 mL    And    sodium chloride 0.9% flush 10 mL       LABS    Recent Results (from the past 24 hour(s))   Comprehensive metabolic panel    Collection Time: 10/12/20  4:47 AM   Result Value Ref Range    Sodium 140 136 - 145 mmol/L    Potassium 3.7 3.5 - 5.1 mmol/L    Chloride 109 95 - 110 mmol/L    CO2 23 23 - 29 mmol/L    Glucose 94 70 - 110 mg/dL    BUN, Bld 11 6 - 20 mg/dL    Creatinine 1.0 0.5 - 1.4 mg/dL    Calcium 7.9 (L) 8.7 - 10.5 mg/dL    Total Protein 5.2 (L) 6.0 - 8.4 g/dL    Albumin 2.1 (L) 3.5 - 5.2 g/dL    Total Bilirubin 0.7 0.1 - 1.0 mg/dL    Alkaline Phosphatase 132 55 - 135 U/L     AST 15 10 - 40 U/L    ALT 21 10 - 44 U/L    Anion Gap 8 8 - 16 mmol/L    eGFR if African American >60 >60 mL/min/1.73 m^2    eGFR if non African American >60 >60 mL/min/1.73 m^2   CBC auto differential    Collection Time: 10/12/20  4:47 AM   Result Value Ref Range    WBC 6.65 3.90 - 12.70 K/uL    RBC 3.20 (L) 4.00 - 5.40 M/uL    Hemoglobin 9.1 (L) 12.0 - 16.0 g/dL    Hematocrit 27.8 (L) 37.0 - 48.5 %    Mean Corpuscular Volume 87 82 - 98 fL    Mean Corpuscular Hemoglobin 28.4 27.0 - 31.0 pg    Mean Corpuscular Hemoglobin Conc 32.7 32.0 - 36.0 g/dL    RDW 13.5 11.5 - 14.5 %    Platelets 159 150 - 350 K/uL    MPV 11.0 9.2 - 12.9 fL    Immature Granulocytes 0.6 (H) 0.0 - 0.5 %    Gran # (ANC) 5.1 1.8 - 7.7 K/uL    Immature Grans (Abs) 0.04 0.00 - 0.04 K/uL    Lymph # 0.8 (L) 1.0 - 4.8 K/uL    Mono # 0.7 0.3 - 1.0 K/uL    Eos # 0.0 0.0 - 0.5 K/uL    Baso # 0.02 0.00 - 0.20 K/uL    nRBC 0 0 /100 WBC    Gran% 76.2 (H) 38.0 - 73.0 %    Lymph% 11.6 (L) 18.0 - 48.0 %    Mono% 10.8 4.0 - 15.0 %    Eosinophil% 0.5 0.0 - 8.0 %    Basophil% 0.3 0.0 - 1.9 %    Differential Method Automated    Comprehensive Metabolic Panel    Collection Time: 10/12/20  4:47 AM   Result Value Ref Range    Sodium 140 136 - 145 mmol/L    Potassium 3.7 3.5 - 5.1 mmol/L    Chloride 109 95 - 110 mmol/L    CO2 23 23 - 29 mmol/L    Glucose 94 70 - 110 mg/dL    BUN, Bld 11 6 - 20 mg/dL    Creatinine 1.0 0.5 - 1.4 mg/dL    Calcium 7.9 (L) 8.7 - 10.5 mg/dL    Total Protein 5.2 (L) 6.0 - 8.4 g/dL    Albumin 2.1 (L) 3.5 - 5.2 g/dL    Total Bilirubin 0.7 0.1 - 1.0 mg/dL    Alkaline Phosphatase 132 55 - 135 U/L    AST 15 10 - 40 U/L    ALT 21 10 - 44 U/L    Anion Gap 8 8 - 16 mmol/L    eGFR if African American >60 >60 mL/min/1.73 m^2    eGFR if non African American >60 >60 mL/min/1.73 m^2   ]    I/O last 3 completed shifts:  In: 790 [P.O.:790]  Out: 4870 [Urine:4870]    Vitals:    10/12/20 0155 10/12/20 0416 10/12/20 0756 10/12/20 0835   BP:  (!) 147/84 (!)  165/84    Pulse:  63 76    Resp:  18 16    Temp: 98.7 °F (37.1 °C) 99.9 °F (37.7 °C) 100 °F (37.8 °C)    TempSrc:  Oral Oral    SpO2:  96% 98% 98%   Weight:       Height:           No Jvd, Thyromegaly or Lymphadenopathy  Lungs: Fairly clear anteriorly and laterally  Cor: RRR no G or rubs  Abd: Soft benign good bowel sounds tender  Ext: No E C C    A)  SP tay corrected  Pyeloneph   Sepsis  Polysubstance abuse      P)  Strongly recommend to stop abusing her body with any recreational drugs  To drink at least 1.5 qt a day of water  Out pt follow up with her primary care MD

## 2020-10-12 NOTE — NURSING
Received report from DOMONIQUE Watts LPN. Pt AAOx4, RR even and unlabored, PERRL with no c/o pain. Telemetry monitor in place. Saline lock in place to site is clear. Pt informed of md orders, oriented to environment and safety maintained with bed low side rails up x2 with nurse call bell within reach

## 2020-10-12 NOTE — PLAN OF CARE
Review of sepsis education with patient.  She stated that she remembered speaking to CRAIG Bradford RN/SR CARMEN about sepsis.  She stated that she knew to call for help if she has uncontrolled vomiting or pain.    ADDITIONAL EDUCATION:  Things You are responsible For To Manage Your Care At Home:  1.    Getting your prescriptions filled   2.    Taking your medications as directed, DO NOT MISS ANY DOSES!  3.    Going to your follow-up doctor appointment. This is important because it  allow the doctor to monitor your progress and determine if  any changes need to made to your treatment plan.  Call [facility] Help at home number for new or repeated problems / symptoms   Call 911 for CP and / or SOB  Patient agreed to all above    NurseSara notified that all CM needs are met       10/12/20 105   Final Note   Assessment Type Final Discharge Note   Anticipated Discharge Disposition Home   Hospital Follow Up  Appt(s) scheduled? Yes   Discharge plans and expectations educations in teach back method with documentation complete? Yes   Right Care Referral Info   Post Acute Recommendation No Care   Post-Acute Status   Post-Acute Authorization Other   Other Status No Post-Acute Service Needs   Discharge Delays None known at this time

## 2020-10-12 NOTE — PLAN OF CARE
Problem: Adult Inpatient Plan of Care  Goal: Plan of Care Review  Outcome: Met  Goal: Patient-Specific Goal (Individualization)  Outcome: Met  Goal: Absence of Hospital-Acquired Illness or Injury  Outcome: Met  Goal: Optimal Comfort and Wellbeing  Outcome: Met  Goal: Readiness for Transition of Care  Outcome: Met  Goal: Rounds/Family Conference  Outcome: Met     Problem: Adjustment to Illness (Sepsis/Septic Shock)  Goal: Optimal Coping  Outcome: Met     Problem: Bleeding (Sepsis/Septic Shock)  Goal: Absence of Bleeding  Outcome: Met     Problem: Infection (Sepsis/Septic Shock)  Goal: Absence of Infection Signs/Symptoms  Outcome: Met     Problem: Nutrition Impaired (Sepsis/Septic Shock)  Goal: Optimal Nutrition Intake  Outcome: Met     Problem: Respiratory Compromise (Sepsis/Septic Shock)  Goal: Effective Oxygenation and Ventilation  Outcome: Met     Problem: Infection  Goal: Infection Symptom Resolution  Outcome: Met     Problem: Fluid Imbalance (Acute Kidney Injury/Impairment)  Goal: Optimal Fluid Balance  Outcome: Met     Problem: Hematologic Alteration (Acute Kidney Injury/Impairment)  Goal: Hemoglobin, Hematocrit and Platelets Within Normal Range  Outcome: Met     Problem: Oral Intake Inadequate (Acute Kidney Injury/Impairment)  Goal: Optimal Nutrition Intake  Outcome: Met     Problem: Renal Function Impairment (Acute Kidney Injury/Impairment)  Goal: Effective Renal Function  Outcome: Met     Problem: Renal Function Impairment (Acute Kidney Injury/Impairment)  Goal: Effective Renal Function  Outcome: Met     Problem: Fall Injury Risk  Goal: Absence of Fall and Fall-Related Injury  Outcome: Met

## 2020-10-13 LAB
OXCARBAZEPINE METABOLITE: <1 MCG/ML (ref 3–35)
TOPIRAMATE SERPL-MCNC: 2.7 MCG/ML

## 2020-10-14 ENCOUNTER — PATIENT OUTREACH (OUTPATIENT)
Dept: ADMINISTRATIVE | Facility: CLINIC | Age: 35
End: 2020-10-14

## 2020-10-14 LAB
BACTERIA BLD CULT: NORMAL
BACTERIA BLD CULT: NORMAL

## 2020-10-14 NOTE — PROGRESS NOTES
C3 nurse attempted to contact patient. No answer.  C3 nurse attempted to contact Karen Ene Cal  for a TCC post hospital discharge follow up call. No answer at phone number listed and no voicemail available. The patient has a HOSFU appointment with Lyndon Garcia MD  on 11/13 @ 1200pm. This appointment is outside of the recommended  7-14 day window following discharge on 10/12. Please make sooner appointment as appropriate. Message sent to Physician staff.

## 2020-10-14 NOTE — PHYSICIAN QUERY
PT Name: Karen Mccall  MR #: [id number]    Consultant Diagnosis Clarification     CDS/: Rocio Houser RN CDI    Contact information: [email or web address]  This form is a permanent document in the medical record.    Query Date: October 14, 2020      By submitting this query, we are merely seeking further clarification of documentation.  Please utilize your independent clinical judgment when addressing the question(s) below.    The Medical Record reflects the following:    Clinical Information Location in Medical Record   admitted for pyelonephritis, sepsis, acute renal failure.  Nephrology is consulted for evaluation.       Impression:   FRANCISCA - prerenal azeotemia, ATN due to infection  Pyelonephritis  Sepsis  Polysubstance abuse          Discussion/Recommendations:   Continue IVF and Abx  Supportive care  Follow up on urine and blood cultures  We'll follow    There is evidence of acute renal failure and sepsis with pyelonephritis.  Neutrophilic leukocytosis present.  Zosyn ceftriaxone given after cultures were obtained.  Toxicology screen positive for marijuana and methamphetamines.  Low-dose of benzodiazepine was given along with IV fluids.  Nephrology was consulted.                BUN   CR   eGFR  10/10   35       2.6    23  10/11   19       1.6    41  10/12   11       1.0    >60  10/13   11       1.0    >60  Renal consult 10/10 T Sharon NICHOLS                          H&P 10/10M Calos NICHOLS            Labs       Please clarify/confirm the Consultants diagnosis of FRANCISCA with ATN:     [  ] FRANCISCA with ATN Diagnosis ruled in     [ x ] ATN Diagnosis ruled out, FRANCISCA ruled in.     [  ] Other diagnosis (please specify): _____________________________     [  ] Clinically undetermined     Present on admission (POA) status:   [   ] Yes (Y)                          [  ] Clinically Undetermined (W)  [   ] No (N)                            [   ] Documentation insufficient to determine if condition is POA (U)

## 2020-10-15 NOTE — PROGRESS NOTES
C3 nurse attempted to contact patient. No answer.  C3 nurse attempted to contact Karen Mccall  for a TCC post hospital discharge follow up call. No answer at phone number listed and no voicemail available. The patient has a HOSFU appointment with Lyndon Garcia MD  on 11/13 @ 1200pm.

## 2021-08-18 ENCOUNTER — LAB VISIT (OUTPATIENT)
Dept: PRIMARY CARE CLINIC | Facility: OTHER | Age: 36
End: 2021-08-18
Attending: INTERNAL MEDICINE
Payer: MEDICAID

## 2021-08-18 DIAGNOSIS — Z20.822 ENCOUNTER FOR LABORATORY TESTING FOR COVID-19 VIRUS: ICD-10-CM

## 2021-08-18 PROCEDURE — U0003 INFECTIOUS AGENT DETECTION BY NUCLEIC ACID (DNA OR RNA); SEVERE ACUTE RESPIRATORY SYNDROME CORONAVIRUS 2 (SARS-COV-2) (CORONAVIRUS DISEASE [COVID-19]), AMPLIFIED PROBE TECHNIQUE, MAKING USE OF HIGH THROUGHPUT TECHNOLOGIES AS DESCRIBED BY CMS-2020-01-R: HCPCS | Performed by: INTERNAL MEDICINE

## 2021-08-20 LAB
SARS-COV-2 RNA RESP QL NAA+PROBE: NOT DETECTED
SARS-COV-2- CYCLE NUMBER: -1

## 2021-10-04 ENCOUNTER — OFFICE VISIT (OUTPATIENT)
Dept: OBSTETRICS AND GYNECOLOGY | Facility: CLINIC | Age: 36
End: 2021-10-04
Payer: MEDICAID

## 2021-10-04 VITALS
BODY MASS INDEX: 22.32 KG/M2 | HEIGHT: 66 IN | DIASTOLIC BLOOD PRESSURE: 62 MMHG | WEIGHT: 138.88 LBS | SYSTOLIC BLOOD PRESSURE: 106 MMHG

## 2021-10-04 DIAGNOSIS — R87.610 ASCUS OF CERVIX WITH NEGATIVE HIGH RISK HPV: ICD-10-CM

## 2021-10-04 DIAGNOSIS — Z01.419 WELL WOMAN EXAM WITH ROUTINE GYNECOLOGICAL EXAM: Primary | ICD-10-CM

## 2021-10-04 PROCEDURE — 99999 PR PBB SHADOW E&M-EST. PATIENT-LVL III: ICD-10-PCS | Mod: PBBFAC,,, | Performed by: OBSTETRICS & GYNECOLOGY

## 2021-10-04 PROCEDURE — 99999 PR PBB SHADOW E&M-EST. PATIENT-LVL III: CPT | Mod: PBBFAC,,, | Performed by: OBSTETRICS & GYNECOLOGY

## 2021-10-04 PROCEDURE — 88142 CYTOPATH C/V THIN LAYER: CPT | Performed by: OBSTETRICS & GYNECOLOGY

## 2021-10-04 PROCEDURE — 87624 HPV HI-RISK TYP POOLED RSLT: CPT | Performed by: OBSTETRICS & GYNECOLOGY

## 2021-10-04 PROCEDURE — 87491 CHLMYD TRACH DNA AMP PROBE: CPT | Performed by: OBSTETRICS & GYNECOLOGY

## 2021-10-04 PROCEDURE — 99395 PR PREVENTIVE VISIT,EST,18-39: ICD-10-PCS | Mod: S$PBB,,, | Performed by: OBSTETRICS & GYNECOLOGY

## 2021-10-04 PROCEDURE — 99395 PREV VISIT EST AGE 18-39: CPT | Mod: S$PBB,,, | Performed by: OBSTETRICS & GYNECOLOGY

## 2021-10-04 PROCEDURE — 87591 N.GONORRHOEAE DNA AMP PROB: CPT | Performed by: OBSTETRICS & GYNECOLOGY

## 2021-10-04 PROCEDURE — 99213 OFFICE O/P EST LOW 20 MIN: CPT | Mod: PBBFAC | Performed by: OBSTETRICS & GYNECOLOGY

## 2021-10-04 RX ORDER — CLONAZEPAM 2 MG/1
2 TABLET ORAL 2 TIMES DAILY PRN
COMMUNITY
Start: 2021-08-21

## 2021-10-04 RX ORDER — DEXTROAMPHETAMINE SACCHARATE, AMPHETAMINE ASPARTATE, DEXTROAMPHETAMINE SULFATE AND AMPHETAMINE SULFATE 5; 5; 5; 5 MG/1; MG/1; MG/1; MG/1
1 TABLET ORAL 2 TIMES DAILY
COMMUNITY
Start: 2021-09-19 | End: 2024-03-13

## 2021-10-04 RX ORDER — QUETIAPINE FUMARATE 50 MG/1
50 TABLET, FILM COATED ORAL NIGHTLY
COMMUNITY
Start: 2021-09-09 | End: 2022-05-02

## 2021-10-05 LAB
C TRACH DNA SPEC QL NAA+PROBE: NOT DETECTED
N GONORRHOEA DNA SPEC QL NAA+PROBE: NOT DETECTED

## 2021-10-08 LAB
FINAL PATHOLOGIC DIAGNOSIS: NORMAL
Lab: NORMAL

## 2022-02-23 ENCOUNTER — LAB VISIT (OUTPATIENT)
Dept: LAB | Facility: HOSPITAL | Age: 37
End: 2022-02-23
Attending: OBSTETRICS & GYNECOLOGY
Payer: MEDICAID

## 2022-02-23 DIAGNOSIS — Z01.419 WELL WOMAN EXAM WITH ROUTINE GYNECOLOGICAL EXAM: ICD-10-CM

## 2022-02-23 PROCEDURE — 87389 HIV-1 AG W/HIV-1&-2 AB AG IA: CPT | Performed by: OBSTETRICS & GYNECOLOGY

## 2022-02-23 PROCEDURE — 36415 COLL VENOUS BLD VENIPUNCTURE: CPT | Performed by: OBSTETRICS & GYNECOLOGY

## 2022-02-25 LAB — HIV 1+2 AB+HIV1 P24 AG SERPL QL IA: NEGATIVE

## 2022-05-02 ENCOUNTER — OFFICE VISIT (OUTPATIENT)
Dept: OBSTETRICS AND GYNECOLOGY | Facility: CLINIC | Age: 37
End: 2022-05-02
Payer: MEDICAID

## 2022-05-02 VITALS
BODY MASS INDEX: 24.8 KG/M2 | DIASTOLIC BLOOD PRESSURE: 70 MMHG | WEIGHT: 154.31 LBS | HEIGHT: 66 IN | SYSTOLIC BLOOD PRESSURE: 120 MMHG

## 2022-05-02 DIAGNOSIS — N76.4 VULVAR ABSCESS: Primary | ICD-10-CM

## 2022-05-02 DIAGNOSIS — N30.00 ACUTE CYSTITIS WITHOUT HEMATURIA: ICD-10-CM

## 2022-05-02 DIAGNOSIS — N94.6 DYSMENORRHEA: ICD-10-CM

## 2022-05-02 PROCEDURE — 3074F PR MOST RECENT SYSTOLIC BLOOD PRESSURE < 130 MM HG: ICD-10-PCS | Mod: CPTII,,, | Performed by: OBSTETRICS & GYNECOLOGY

## 2022-05-02 PROCEDURE — 1159F PR MEDICATION LIST DOCUMENTED IN MEDICAL RECORD: ICD-10-PCS | Mod: CPTII,,, | Performed by: OBSTETRICS & GYNECOLOGY

## 2022-05-02 PROCEDURE — 87491 CHLMYD TRACH DNA AMP PROBE: CPT | Performed by: OBSTETRICS & GYNECOLOGY

## 2022-05-02 PROCEDURE — 99214 OFFICE O/P EST MOD 30 MIN: CPT | Mod: PBBFAC | Performed by: OBSTETRICS & GYNECOLOGY

## 2022-05-02 PROCEDURE — 3078F PR MOST RECENT DIASTOLIC BLOOD PRESSURE < 80 MM HG: ICD-10-PCS | Mod: CPTII,,, | Performed by: OBSTETRICS & GYNECOLOGY

## 2022-05-02 PROCEDURE — 1160F PR REVIEW ALL MEDS BY PRESCRIBER/CLIN PHARMACIST DOCUMENTED: ICD-10-PCS | Mod: CPTII,,, | Performed by: OBSTETRICS & GYNECOLOGY

## 2022-05-02 PROCEDURE — 99213 OFFICE O/P EST LOW 20 MIN: CPT | Mod: S$PBB,,, | Performed by: OBSTETRICS & GYNECOLOGY

## 2022-05-02 PROCEDURE — 1159F MED LIST DOCD IN RCRD: CPT | Mod: CPTII,,, | Performed by: OBSTETRICS & GYNECOLOGY

## 2022-05-02 PROCEDURE — 99999 PR PBB SHADOW E&M-EST. PATIENT-LVL IV: ICD-10-PCS | Mod: PBBFAC,,, | Performed by: OBSTETRICS & GYNECOLOGY

## 2022-05-02 PROCEDURE — 99213 PR OFFICE/OUTPT VISIT, EST, LEVL III, 20-29 MIN: ICD-10-PCS | Mod: S$PBB,,, | Performed by: OBSTETRICS & GYNECOLOGY

## 2022-05-02 PROCEDURE — 1160F RVW MEDS BY RX/DR IN RCRD: CPT | Mod: CPTII,,, | Performed by: OBSTETRICS & GYNECOLOGY

## 2022-05-02 PROCEDURE — 3008F PR BODY MASS INDEX (BMI) DOCUMENTED: ICD-10-PCS | Mod: CPTII,,, | Performed by: OBSTETRICS & GYNECOLOGY

## 2022-05-02 PROCEDURE — 3008F BODY MASS INDEX DOCD: CPT | Mod: CPTII,,, | Performed by: OBSTETRICS & GYNECOLOGY

## 2022-05-02 PROCEDURE — 87591 N.GONORRHOEAE DNA AMP PROB: CPT | Performed by: OBSTETRICS & GYNECOLOGY

## 2022-05-02 PROCEDURE — 3078F DIAST BP <80 MM HG: CPT | Mod: CPTII,,, | Performed by: OBSTETRICS & GYNECOLOGY

## 2022-05-02 PROCEDURE — 3074F SYST BP LT 130 MM HG: CPT | Mod: CPTII,,, | Performed by: OBSTETRICS & GYNECOLOGY

## 2022-05-02 PROCEDURE — 99999 PR PBB SHADOW E&M-EST. PATIENT-LVL IV: CPT | Mod: PBBFAC,,, | Performed by: OBSTETRICS & GYNECOLOGY

## 2022-05-02 RX ORDER — CHLORHEXIDINE GLUCONATE ORAL RINSE 1.2 MG/ML
SOLUTION DENTAL
COMMUNITY
Start: 2022-05-02

## 2022-05-02 RX ORDER — SULFAMETHOXAZOLE AND TRIMETHOPRIM 800; 160 MG/1; MG/1
1 TABLET ORAL 2 TIMES DAILY
Qty: 14 TABLET | Refills: 0 | Status: SHIPPED | OUTPATIENT
Start: 2022-05-02 | End: 2024-03-13

## 2022-05-02 RX ORDER — QUETIAPINE FUMARATE 100 MG/1
100 TABLET, FILM COATED ORAL NIGHTLY
COMMUNITY
Start: 2022-04-19

## 2022-05-02 RX ORDER — NAPROXEN 500 MG/1
500 TABLET ORAL 2 TIMES DAILY WITH MEALS
Qty: 60 TABLET | Refills: 1 | Status: SHIPPED | OUTPATIENT
Start: 2022-05-02

## 2022-05-02 NOTE — PROGRESS NOTES
"  Subjective:       Patient ID: Karen Mccall is a 37 y.o. female.    Chief Complaint:  Vaginal Pain (Vaginal boil) and Menstrual Problem (Irregular menses skipping months and very painful)      History of Present Illness  HPI  Patient comes in today complaining of having "bumps" on the vulva.  Draining a few days ago.  New ones coming.  Pain.  Denies fever and chills. No nausea or vomiting.    ?Frequency and urgency    Also with irregular and painful menses.  Skipping months at time.    History of syphilis.  Status post treatment  Status post laparoscopic tubal cauterization on right       GYN & OB History  Patient's last menstrual period was 03/15/2022 (within days).   Date of Last Pap: 10/8/2021    OB History    Para Term  AB Living   5 4 4   1 4   SAB IAB Ectopic Multiple Live Births   1     0 4      # Outcome Date GA Lbr Kemar/2nd Weight Sex Delivery Anes PTL Lv   5 Term 14 39w2d  2.432 kg (5 lb 5.8 oz) F Vag-Spont EPI N MAYDA   4 Term 10/03/12 39w0d  3.164 kg (6 lb 15.6 oz) M INDUCTION EPI N MAYDA   3 Term 05   3.317 kg (7 lb 5 oz) M Vag-Spont EPI N MAYDA   2 Term 03   3.317 kg (7 lb 5 oz) M Vag-Spont EPI N MAYDA   1 SAB 02             Past Medical History:   Diagnosis Date    Anxiety     Asthma     as a child, no recent episodes    GERD (gastroesophageal reflux disease)     Mental disorder     Methamphetamine abuse     Migraine headache     Smoker     Suicidal ideation     Umbilical hernia     Vaginal delivery     x4       Past Surgical History:   Procedure Laterality Date    DILATION AND CURETTAGE OF UTERUS      HYSTEROSCOPY      TUBAL LIGATION      VAGINAL DELIVERY      x 4       Family History   Problem Relation Age of Onset    Cancer Neg Hx     Eclampsia Neg Hx        Social History     Socioeconomic History    Marital status: Single   Tobacco Use    Smoking status: Current Every Day Smoker     Packs/day: 1.00     Years: 5.00     Pack years: 5.00     " Types: Cigarettes    Smokeless tobacco: Never Used   Substance and Sexual Activity    Alcohol use: Yes     Comment: socially    Drug use: No    Sexual activity: Yes     Partners: Male     Birth control/protection: None   Social History Narrative    Together since 7/2021    She is a homemaker.           Current Outpatient Medications   Medication Sig Dispense Refill    acetaminophen-codeine 300-30mg (TYLENOL #3) 300-30 mg Tab Take 1 tablet by mouth every 12 (twelve) hours as needed (pain). 30 tablet 0    albuterol (VENTOLIN HFA) 90 mcg/actuation inhaler Inhale 2 puffs into the lungs every 6 (six) hours as needed for Wheezing. Rescue 18 g 5    busPIRone (BUSPAR) 15 MG tablet Take 15 mg by mouth 3 (three) times daily.      butalbital-acetaminophen-caffeine -40 mg (FIORICET, ESGIC) -40 mg per tablet Take 1 tablet by mouth once daily. 30 tablet 0    celecoxib (CELEBREX) 200 MG capsule Take 1 capsule (200 mg total) by mouth 2 (two) times daily. 60 capsule 3    chlorhexidine (PERIDEX) 0.12 % solution SMARTSIG:By Mouth      clonazePAM (KLONOPIN) 2 MG Tab Take 2 mg by mouth 2 (two) times daily as needed.      dextroamphetamine-amphetamine (ADDERALL) 20 mg tablet Take 1 tablet by mouth 2 (two) times daily.      fluticasone propionate (FLONASE) 50 mcg/actuation nasal spray 1-2 sprays ( mcg total) by Each Nostril route once daily. 16 g 2    gabapentin (NEURONTIN) 300 MG capsule Take 1 capsule (300 mg total) by mouth 2 (two) times daily. 60 capsule 5    LIDOcaine (LIDODERM) 5 % Place 1 patch onto the skin once daily. Remove & Discard patch within 12 hours or as directed by MD 30 patch 3    loratadine (CLARITIN) 10 mg tablet TAKE ONE TABLET BY MOUTH EVERY DAY 30 tablet 5    meloxicam (MOBIC) 15 MG tablet Take 1 tablet (15 mg total) by mouth once daily. TAKE WITH FOOD 30 tablet 1    metoprolol tartrate (LOPRESSOR) 25 MG tablet take ONE-HALF TABLET BY MOUTH TWICE DAILY 30 tablet 9     OXcarbazepine (TRILEPTAL) 600 MG Tab Take 1 tablet (600 mg total) by mouth 2 (two) times daily. 60 tablet 5    paroxetine (PAXIL) 20 MG tablet take ONE TABLET BY MOUTH every morning 30 tablet 1    QUEtiapine (SEROQUEL) 100 MG Tab Take 100 mg by mouth nightly.      topiramate (TOPAMAX) 25 MG tablet Take 1 tablet (25 mg total) by mouth 2 (two) times daily. 60 tablet 5     No current facility-administered medications for this visit.       Review of patient's allergies indicates:  No Known Allergies    Review of Systems  Review of Systems   Constitutional: Negative for activity change, appetite change, chills, fatigue, fever and unexpected weight change.   HENT: Negative for mouth sores.    Respiratory: Negative for cough, shortness of breath and wheezing.    Cardiovascular: Negative for chest pain and palpitations.   Gastrointestinal: Negative for abdominal pain, bloating, blood in stool, constipation, nausea and vomiting.   Endocrine: Negative for diabetes and hot flashes.   Genitourinary: Positive for frequency. Negative for dysmenorrhea, dyspareunia, dysuria, hematuria, menorrhagia, menstrual problem, pelvic pain, urgency, vaginal bleeding, vaginal discharge, vaginal pain, urinary incontinence, postcoital bleeding and vaginal odor.        Vulva abscesses   Musculoskeletal: Negative for back pain and myalgias.   Integumentary:  Negative for rash, breast mass and nipple discharge.   Neurological: Negative for seizures and headaches.   Psychiatric/Behavioral: Negative for depression and sleep disturbance. The patient is not nervous/anxious.    Breast: Negative for mass, mastodynia and nipple discharge          Objective:    Physical Exam:   Constitutional: She appears well-developed and well-nourished. No distress.   BMI of 24.9    HENT:   Head: Normocephalic and atraumatic.    Eyes: EOM are normal.      Pulmonary/Chest: Effort normal. No respiratory distress.        Abdominal: Soft. She exhibits no distension.  There is no abdominal tenderness. There is no rebound and no guarding.   Suprapubic tenderness     Genitourinary:    Vagina and uterus normal.   No  no vaginal discharge in the vagina.    Genitourinary Comments: Vulva with multiple healing abscesses.  Urethral meatus normal size and location without any lesion.  Urethra is non-tender without stricture or discharge.  Bladder is tender.  Vaginal vault with good support.  Minimal white discharge noted.  No obvious lesion.  Normal rugation.  Cervix is without any cervical motion tenderness.  No obvious lesion.  Uterus is small, non-tender, normal contour.  Adnexa is without any masses or tenderness.  Perineum without obvious lesion.               Musculoskeletal: Normal range of motion.       Neurological: She is alert.    Skin: Skin is warm and dry.    Psychiatric: She has a normal mood and affect.        Urine dipstick: Leuk and trace blood   Assessment:        1. Vulvar abscess    2. Acute cystitis without hematuria              Plan:      I have discussed with the patient regarding her condition.  Bactrim DS  More water  Perineal  Hygiene discussed  Naproxen for dysmenorrhea  Pelvic  Ultrasound  GC, CT  Back in 2-3 months.

## 2022-05-05 ENCOUNTER — PATIENT MESSAGE (OUTPATIENT)
Dept: OBSTETRICS AND GYNECOLOGY | Facility: CLINIC | Age: 37
End: 2022-05-05
Payer: MEDICAID

## 2022-05-05 DIAGNOSIS — A54.9 GONORRHEA: Primary | ICD-10-CM

## 2022-05-05 LAB
C TRACH DNA SPEC QL NAA+PROBE: NOT DETECTED
N GONORRHOEA DNA SPEC QL NAA+PROBE: DETECTED

## 2022-05-05 RX ORDER — CEFTRIAXONE 500 MG/1
500 INJECTION, POWDER, FOR SOLUTION INTRAMUSCULAR; INTRAVENOUS ONCE
Qty: 0.5 G | Refills: 0 | Status: SHIPPED | OUTPATIENT
Start: 2022-05-05 | End: 2022-05-05

## 2022-05-18 ENCOUNTER — CLINICAL SUPPORT (OUTPATIENT)
Dept: OBSTETRICS AND GYNECOLOGY | Facility: CLINIC | Age: 37
End: 2022-05-18
Payer: MEDICAID

## 2022-05-18 ENCOUNTER — TELEPHONE (OUTPATIENT)
Dept: OBSTETRICS AND GYNECOLOGY | Facility: CLINIC | Age: 37
End: 2022-05-18
Payer: MEDICAID

## 2022-05-18 DIAGNOSIS — A54.9 GONORRHEA: Primary | ICD-10-CM

## 2022-05-18 PROCEDURE — 96372 THER/PROPH/DIAG INJ SC/IM: CPT | Mod: PBBFAC

## 2022-05-18 RX ORDER — CEFTRIAXONE 500 MG/1
500 INJECTION, POWDER, FOR SOLUTION INTRAMUSCULAR; INTRAVENOUS
Status: COMPLETED | OUTPATIENT
Start: 2022-05-18 | End: 2022-05-18

## 2022-05-18 RX ORDER — LIDOCAINE HYDROCHLORIDE AND EPINEPHRINE 20; 10 MG/ML; UG/ML
1 INJECTION, SOLUTION INFILTRATION; PERINEURAL
Status: COMPLETED | OUTPATIENT
Start: 2022-05-18 | End: 2022-05-18

## 2022-05-18 RX ADMIN — CEFTRIAXONE SODIUM 500 MG: 500 INJECTION, POWDER, FOR SOLUTION INTRAMUSCULAR; INTRAVENOUS at 12:05

## 2022-05-18 RX ADMIN — LIDOCAINE HYDROCHLORIDE AND EPINEPHRINE 1 ML: 20; 10 INJECTION, SOLUTION INFILTRATION; PERINEURAL at 12:05

## 2022-05-18 NOTE — PROGRESS NOTES
Pt arrived Injection given without difficulty. Pt instructed to wait in the waiting area for 15 minutes for any adverse reactions. Pt verbalized understanding.

## 2022-05-18 NOTE — TELEPHONE ENCOUNTER
Called pt. Pt has been notified of STD results and was instructed to  her injection and come to the office today to receive it. STD precautions have been given. Pt voiced understanding.

## 2023-05-23 ENCOUNTER — TELEPHONE (OUTPATIENT)
Dept: OBSTETRICS AND GYNECOLOGY | Facility: CLINIC | Age: 38
End: 2023-05-23
Payer: MEDICAID

## 2023-05-23 NOTE — TELEPHONE ENCOUNTER
Pt has been scheduled to see Austin on 5/26 regarding abnormal bleeding and will see Mike later on in the year for her annual.     ----- Message from Jordyn Rodriguez sent at 5/23/2023  9:26 AM CDT -----  Regarding: Request for Sooner Apt  Type:  Sooner Appointment Request    Patient is requesting a sooner appointment.  Patient declined first available appointment listed as well as another facility and provider .  Patient will not accept being placed on the waitlist and is requesting a message be sent to doctor.    Name of Caller: Self     When is the first available appointment? 7/6/23    Symptoms: Bleeding on the 1st stopped and started 3x's. The 2nd time she was in a lot of pain      Would the patient rather a call back or a response via [facility]? Call     Best Call Back Number: .[phone]      Additional Information:

## 2023-05-26 ENCOUNTER — OFFICE VISIT (OUTPATIENT)
Dept: OBSTETRICS AND GYNECOLOGY | Facility: CLINIC | Age: 38
End: 2023-05-26
Payer: MEDICAID

## 2023-05-26 VITALS
SYSTOLIC BLOOD PRESSURE: 155 MMHG | HEIGHT: 64 IN | DIASTOLIC BLOOD PRESSURE: 96 MMHG | WEIGHT: 131.63 LBS | HEART RATE: 114 BPM | OXYGEN SATURATION: 98 % | BODY MASS INDEX: 22.47 KG/M2

## 2023-05-26 DIAGNOSIS — N93.9 ABNORMAL UTERINE BLEEDING (AUB): Primary | ICD-10-CM

## 2023-05-26 PROCEDURE — 3077F SYST BP >= 140 MM HG: CPT | Mod: CPTII,,,

## 2023-05-26 PROCEDURE — 3077F PR MOST RECENT SYSTOLIC BLOOD PRESSURE >= 140 MM HG: ICD-10-PCS | Mod: CPTII,,,

## 2023-05-26 PROCEDURE — 99999 PR PBB SHADOW E&M-EST. PATIENT-LVL III: CPT | Mod: PBBFAC,,,

## 2023-05-26 PROCEDURE — 1159F PR MEDICATION LIST DOCUMENTED IN MEDICAL RECORD: ICD-10-PCS | Mod: CPTII,,,

## 2023-05-26 PROCEDURE — 81514 NFCT DS BV&VAGINITIS DNA ALG: CPT

## 2023-05-26 PROCEDURE — 1159F MED LIST DOCD IN RCRD: CPT | Mod: CPTII,,,

## 2023-05-26 PROCEDURE — 99213 OFFICE O/P EST LOW 20 MIN: CPT | Mod: PBBFAC

## 2023-05-26 PROCEDURE — 3080F DIAST BP >= 90 MM HG: CPT | Mod: CPTII,,,

## 2023-05-26 PROCEDURE — 99213 OFFICE O/P EST LOW 20 MIN: CPT | Mod: S$PBB,,,

## 2023-05-26 PROCEDURE — 3008F BODY MASS INDEX DOCD: CPT | Mod: CPTII,,,

## 2023-05-26 PROCEDURE — 87591 N.GONORRHOEAE DNA AMP PROB: CPT

## 2023-05-26 PROCEDURE — 3008F PR BODY MASS INDEX (BMI) DOCUMENTED: ICD-10-PCS | Mod: CPTII,,,

## 2023-05-26 PROCEDURE — 99999 PR PBB SHADOW E&M-EST. PATIENT-LVL III: ICD-10-PCS | Mod: PBBFAC,,,

## 2023-05-26 PROCEDURE — 99213 PR OFFICE/OUTPT VISIT, EST, LEVL III, 20-29 MIN: ICD-10-PCS | Mod: S$PBB,,,

## 2023-05-26 PROCEDURE — 3080F PR MOST RECENT DIASTOLIC BLOOD PRESSURE >= 90 MM HG: ICD-10-PCS | Mod: CPTII,,,

## 2023-05-26 NOTE — PROGRESS NOTES
CC: Irregular cycle    HPI: Pt is a 38 y.o.  female who presents c/o irregular cycle.   Pt states her cycle started on 5/1/2023 and lasted for 4 days. She started bleeding a week later and reports having to use double protection and intense cramping that lasted about 4 days. This was followed by a third round of bleeding starting on 5/23 that was light and lasted about 3 days. She has not had bleeding since that. She reports increased stress at this time. She has a new partner since 11/2022. She requests STD screening.  She denies dizziness, syncope, sob, palpitations, or fatigue.      ROS:  GENERAL: Feeling well overall. Denies fever or chills.   SKIN: Denies rash or lesions.   HEAD: Denies head injury or headache.   NODES: Denies enlarged lymph nodes.   CHEST: Denies chest pain or shortness of breath.   CARDIOVASCULAR: Denies palpitations or left sided chest pain.   ABDOMEN: No abdominal pain, constipation, diarrhea, nausea, vomiting or rectal bleeding.   URINARY: No dysuria, hematuria, or burning on urination.  REPRODUCTIVE: See HPI.   BREASTS: Denies pain, lumps, or nipple discharge.   HEMATOLOGIC: No easy bruisability or excessive bleeding.   MUSCULOSKELETAL: Denies joint pain or swelling.   NEUROLOGIC: Denies syncope or weakness.   PSYCHIATRIC: Denies depression, anxiety or mood swings.    PE:   APPEARANCE: Well nourished, well developed, White female in no acute distress.  VULVA: No lesions. Normal external female genitalia.  URETHRAL MEATUS: Normal size and location, no lesions, no prolapse.  URETHRA: No masses, tenderness, or prolapse.  VAGINA: Moist. No lesions or lacerations noted. No abnormal discharge present. No odor present.   CERVIX: No lesions or discharge. No cervical motion tenderness.   UTERUS: Normal size, regular shape, mobile, non-tender.  ADNEXA: No tenderness. No fullness or masses palpated in the adnexal regions.   ANUS PERINEUM: Normal.      Diagnosis:  1. Abnormal uterine bleeding (AUB)   "      Plan:     Orders Placed This Encounter    C. trachomatis/N. gonorrhoeae by AMP DNA    Vaginosis Screen by DNA Probe       COUNSELING:  - Discussed anovulatory cycles: Most women will have a cycle each year where bleeding is irregular. The ovaries produce a "bad" egg that is not released normally, resulting in a cycle with unbalanced hormones.  Bleeding will often be abnormal during this cycle - you may skip a cycle entirely, have an unusually heavy cycle, or have breakthrough bleeding during the month. There may be pain in the right or left lower abdomen if there is an associated functional or hemorrhagic cyst of the ovary where ovulation did not proceed normally.   It generally takes 4-6 weeks for the ovaries to get back on track and for everything to normalize.  I recommend we wait and see if your next cycle gets back on track or at least improves significantly.  If bleeding becomes heavy, let me know and I can prescribe a 10-day course of provera to control the bleeding and prepare the uterus to complete necessary period bleeding.  If bleeding or pain doesn't resolve we can investigate further with either an office test or an ultrasound.      -to call if continued or worsening bleeding and to go to the ED if heavy bleeding.     Follow-up PRN no resolution of symptoms.  "

## 2023-05-30 LAB
C TRACH DNA SPEC QL NAA+PROBE: NOT DETECTED
N GONORRHOEA DNA SPEC QL NAA+PROBE: NOT DETECTED

## 2023-05-31 DIAGNOSIS — B96.89 BV (BACTERIAL VAGINOSIS): Primary | ICD-10-CM

## 2023-05-31 DIAGNOSIS — N76.0 BV (BACTERIAL VAGINOSIS): Primary | ICD-10-CM

## 2023-05-31 LAB
BACTERIAL VAGINOSIS DNA: POSITIVE
CANDIDA GLABRATA DNA: NEGATIVE
CANDIDA KRUSEI DNA: NEGATIVE
CANDIDA RRNA VAG QL PROBE: NEGATIVE
T VAGINALIS RRNA GENITAL QL PROBE: NEGATIVE

## 2023-05-31 RX ORDER — METRONIDAZOLE 500 MG/1
500 TABLET ORAL 2 TIMES DAILY
Qty: 14 TABLET | Refills: 0 | Status: SHIPPED | OUTPATIENT
Start: 2023-05-31 | End: 2023-06-07

## 2023-07-06 NOTE — TELEPHONE ENCOUNTER
Discharge instructions given to patient, no questions at this time.   Encouraged her to follow-up with PCP.  Return to the ED if needed.   Verbalized understanding of instructions.   Ambulated out of ED.    Patient sterilized via bilateral tubal ligation in 2015.  Paragard in office.  Will be discarded.

## 2024-01-03 ENCOUNTER — TELEPHONE (OUTPATIENT)
Dept: OBSTETRICS AND GYNECOLOGY | Facility: CLINIC | Age: 39
End: 2024-01-03
Payer: MEDICAID

## 2024-01-03 NOTE — TELEPHONE ENCOUNTER
I called and spoke to patient re: her Sx. I explained she would need to seen before any medication can be Rx'd. She opted to see NPAustin sooner than her 1/29 appt with Dr. Mckeon. She had no further questions.    JUANITA Torrez  [phone]    ----- Message from Dulce Patel sent at 1/3/2024 11:33 AM CST -----  Regarding: Requesting advice  .Type:  Needs Medical Advice/Symptom-based Call    Who Called: Self     Symptoms (please be specific): vaginal itching     How long has patient had these symptoms:  1 week    Would the patient rather a call back or a response via [facility]? Call     Best Call Back Number: .[phone]      Additional Information: asking if something can be called in     [facility] - [address] [facility] [address]   [address] [address] [place] [address]  Phone: [phone] Fax: [phone]

## 2024-03-04 ENCOUNTER — TELEPHONE (OUTPATIENT)
Dept: OBSTETRICS AND GYNECOLOGY | Facility: CLINIC | Age: 39
End: 2024-03-04
Payer: MEDICAID

## 2024-03-04 NOTE — TELEPHONE ENCOUNTER
Spoke with pt and informed she can try Monistat OTC until her appt with IMELDA-Austin Walden on 03/15. HUANG.

## 2024-09-12 ENCOUNTER — OFFICE VISIT (OUTPATIENT)
Dept: OBSTETRICS AND GYNECOLOGY | Facility: CLINIC | Age: 39
End: 2024-09-12
Payer: MEDICAID

## 2024-09-12 VITALS
DIASTOLIC BLOOD PRESSURE: 64 MMHG | SYSTOLIC BLOOD PRESSURE: 104 MMHG | HEIGHT: 63 IN | BODY MASS INDEX: 25.35 KG/M2 | WEIGHT: 143.06 LBS

## 2024-09-12 DIAGNOSIS — Z86.19 HISTORY OF SYPHILIS: ICD-10-CM

## 2024-09-12 DIAGNOSIS — Z11.3 SCREEN FOR STD (SEXUALLY TRANSMITTED DISEASE): ICD-10-CM

## 2024-09-12 DIAGNOSIS — Z01.419 WELL WOMAN EXAM WITH ROUTINE GYNECOLOGICAL EXAM: Primary | ICD-10-CM

## 2024-09-12 PROCEDURE — 99999 PR PBB SHADOW E&M-EST. PATIENT-LVL III: CPT | Mod: PBBFAC,,, | Performed by: OBSTETRICS & GYNECOLOGY

## 2024-09-12 PROCEDURE — 87491 CHLMYD TRACH DNA AMP PROBE: CPT | Performed by: OBSTETRICS & GYNECOLOGY

## 2024-09-12 PROCEDURE — 99213 OFFICE O/P EST LOW 20 MIN: CPT | Mod: PBBFAC | Performed by: OBSTETRICS & GYNECOLOGY

## 2024-09-12 PROCEDURE — 87591 N.GONORRHOEAE DNA AMP PROB: CPT | Performed by: OBSTETRICS & GYNECOLOGY

## 2024-09-12 RX ORDER — AMLODIPINE BESYLATE 10 MG/1
TABLET ORAL
COMMUNITY
Start: 2023-08-17

## 2024-09-12 RX ORDER — DEXTROAMPHETAMINE SACCHARATE, AMPHETAMINE ASPARTATE, DEXTROAMPHETAMINE SULFATE AND AMPHETAMINE SULFATE 5; 5; 5; 5 MG/1; MG/1; MG/1; MG/1
1 TABLET ORAL 2 TIMES DAILY
COMMUNITY
Start: 2024-09-09

## 2024-09-12 NOTE — PROGRESS NOTES
Subjective:       Patient ID: Karen Mccall is a 39 y.o. female.    Chief Complaint:  Well Woman (Last normal pap with Negative HPV was 10/04/2021.)        History of Present Illness  HPI  Annual Exam-Premenopausal  Patient presents for annual exam. The patient has no complaints today. The patient is not currently sexually active. GYN screening history:  Last normal pap with Negative HPV was 10/04/2021 . The patient wears seatbelts: yes. The patient participates in regular exercise: no. Has the patient ever been transfused or tattooed?:  no/yes . The patient reports that there is not domestic violence in her life.    Status post LTC   New partner.  Would like STD screening and another child.      GYN & OB History  Patient's last menstrual period was 2024 (within days).   Date of Last Pap: 10/8/2021    OB History    Para Term  AB Living   5 4 4   1 4   SAB IAB Ectopic Multiple Live Births   1     0 4      # Outcome Date GA Lbr Kemar/2nd Weight Sex Type Anes PTL Lv   5 Term 14 39w2d  2.432 kg (5 lb 5.8 oz) F Vag-Spont EPI N MAYDA   4 Term 10/03/12 39w0d  3.164 kg (6 lb 15.6 oz) M INDUCTION EPI N MAYDA   3 Term 05   3.317 kg (7 lb 5 oz) M Vag-Spont EPI N MAYDA   2 Term 03   3.317 kg (7 lb 5 oz) M Vag-Spont EPI N MAYDA   1 SAB 02               Past Medical History:   Diagnosis Date    Anxiety     Asthma     as a child, no recent episodes    GERD (gastroesophageal reflux disease)     Mental disorder     Methamphetamine abuse     Migraine headache     Smoker     Suicidal ideation     Umbilical hernia     Vaginal delivery     x4       Past Surgical History:   Procedure Laterality Date    DILATION AND CURETTAGE OF UTERUS      HYSTEROSCOPY      TUBAL LIGATION      VAGINAL DELIVERY      x 4       Family History   Problem Relation Name Age of Onset    Cancer Neg Hx      Eclampsia Neg Hx         Social History     Socioeconomic History    Marital status: Single   Tobacco Use    Smoking  status: Every Day     Current packs/day: 1.00     Average packs/day: 1 pack/day for 5.0 years (5.0 ttl pk-yrs)     Types: Cigarettes    Smokeless tobacco: Never   Substance and Sexual Activity    Alcohol use: Yes    Drug use: No    Sexual activity: Yes     Partners: Male     Birth control/protection: None   Social History Narrative    Together since summer 2024    She is a homemaker.           Current Outpatient Medications   Medication Sig Dispense Refill    albuterol (PROVENTIL/VENTOLIN HFA) 90 mcg/actuation inhaler INHALE TWO PUFFS BY MOUTH EVERY 6 HOURS AS NEEDED FOR WHEEZING 8.5 g 1    amLODIPine (NORVASC) 10 MG tablet TAKE 1 TABLET EVERY DAY BY MOUTH      busPIRone (BUSPAR) 15 MG tablet Take 1 tablet (15 mg total) by mouth 2 (two) times daily. 60 tablet 5    butalbital-acetaminophen-caffeine -40 mg (FIORICET, ESGIC) -40 mg per tablet TAKE ONE TABLET BY MOUTH ONCE A DAY 30 tablet 0    clonazePAM (KLONOPIN) 2 MG Tab Take 2 mg by mouth 2 (two) times daily as needed.      cyclobenzaprine (FLEXERIL) 5 MG tablet Take 1 tablet (5 mg total) by mouth nightly as needed. 30 tablet 1    dextroamphetamine-amphetamine (ADDERALL) 20 mg tablet Take 1 tablet by mouth 2 (two) times daily.      fluticasone propionate (FLONASE) 50 mcg/actuation nasal spray 1-2 sprays ( mcg total) by Each Nostril route once daily. 16 g 2    gabapentin (NEURONTIN) 300 MG capsule take ONE CAPSULE BY MOUTH TWICE DAILY 60 capsule 5    meclizine (ANTIVERT) 25 mg tablet Take 1 tablet (25 mg total) by mouth 3 (three) times daily as needed for Dizziness. 30 tablet 0    meloxicam (MOBIC) 15 MG tablet Take 1 tablet (15 mg total) by mouth once daily. TAKE WITH FOOD 30 tablet 1    ondansetron (ZOFRAN-ODT) 4 MG TbDL Take 1 tablet (4 mg total) by mouth every 8 (eight) hours as needed (nausea). 30 tablet 3    OXcarbazepine (TRILEPTAL) 600 MG Tab Take 1 tablet (600 mg total) by mouth 2 (two) times daily. 60 tablet 5    paroxetine (PAXIL) 20 MG  tablet take ONE TABLET BY MOUTH every morning 30 tablet 1    promethazine-dextromethorphan (PROMETHAZINE-DM) 6.25-15 mg/5 mL Syrp 1 teaspoon PO Q 8 hrs PRN cough. DO NOT DRIVE AFTER TAKING  mL 0    QUEtiapine (SEROQUEL) 100 MG Tab Take 100 mg by mouth nightly.      topiramate (TOPAMAX) 25 MG tablet TAKE ONE TABLET BY MOUTH TWICE DAILY 60 tablet 5     No current facility-administered medications for this visit.       Review of patient's allergies indicates:  No Known Allergies     Review of Systems  Review of Systems   Constitutional:  Negative for activity change, appetite change, chills, fatigue, fever and unexpected weight change.   HENT:  Negative for mouth sores.    Respiratory:  Negative for cough, shortness of breath and wheezing.    Cardiovascular:  Negative for chest pain and palpitations.   Gastrointestinal:  Negative for abdominal pain, bloating, blood in stool, constipation, nausea and vomiting.   Endocrine: Negative for diabetes and hot flashes.   Genitourinary:  Negative for dysmenorrhea, dyspareunia, dysuria, frequency, hematuria, menorrhagia, menstrual problem, pelvic pain, urgency, vaginal bleeding, vaginal discharge, vaginal pain, urinary incontinence, postcoital bleeding and vaginal odor.   Musculoskeletal:  Negative for back pain and myalgias.   Integumentary:  Negative for rash, breast mass and nipple discharge.   Neurological:  Negative for seizures and headaches.   Psychiatric/Behavioral:  Negative for depression and sleep disturbance. The patient is not nervous/anxious.    Breast: Negative for mass, mastodynia and nipple discharge          Objective:    Physical Exam:   Constitutional: She appears well-developed and well-nourished. No distress.   BMI of 25.35    HENT:   Head: Normocephalic and atraumatic.    Eyes: EOM are normal.      Pulmonary/Chest: Effort normal. No respiratory distress.   Breasts: Non-tender, no engorgement, no masses, no retraction, no discharge. Negative for  lymphadenopathy.         Abdominal: Soft. She exhibits no distension. There is no abdominal tenderness. There is no rebound and no guarding.     Genitourinary:    Vagina and uterus normal.   No vaginal discharge in the vagina.    Genitourinary Comments: Vulva without any obvious lesions.  Urethral meatus normal size and location without any lesion.  Urethra is non-tender without stricture or discharge.  Bladder is non-tender.  Vaginal vault with good support.  Minimal white discharge noted.  No obvious lesion.  Normal rugation.  Cervix is without any cervical motion tenderness.  No obvious lesion.  Uterus is small, non-tender, normal contour.  Adnexa is without any masses or tenderness.  Perineum without obvious lesion.               Musculoskeletal: Normal range of motion.       Neurological: She is alert.    Skin: Skin is warm and dry.    Psychiatric: She has a normal mood and affect.          Assessment:        1. Well woman exam with routine gynecological exam    2. Screen for STD (sexually transmitted disease)    3. History of syphilis    4.  Status post sterilization, laparoscopic tubal cauterization         Plan:          I have discussed with the patient her condition.  Monthly breast examination was instructed, discussed, and encouraged.  Patient was encouraged to consume a low-calorie, low fat diet, and to increase of physical activity.  Healthy habits encouraged.  A Pap smear was performed with HR-HPV according to the USPSTF recommendations.  Mammogram was not ordered because of the combination of her age and risk factors, according to ACOG guidelines.  Gonorrhea and Chlamydia testing performed;  HIV test offered, again according to guidelines.  Colonoscopy discussed according to ACS guideline.  We also discussed her obstetric history and CV risks.   Care Gaps addressed.  Patient is to continue her medications as prescribed.   She will come back to see me in one year for her annual visit.  She can come  back to see me sooner as necessary.  All of her questions were answered appropriately to her satisfaction.     HIV, RPR, HBsAg, HCV-Abs ordered per request for STD screening    We discussed her desire to have another child.  She is status post tubal cauterization  Refer to [facility]   Needs to quit smoking and all drug use       ** A female chaperone, Genesis Poon, was present for the pelvic exam

## 2024-09-14 LAB
C TRACH DNA SPEC QL NAA+PROBE: NOT DETECTED
N GONORRHOEA DNA SPEC QL NAA+PROBE: NOT DETECTED

## 2024-09-20 ENCOUNTER — TELEPHONE (OUTPATIENT)
Dept: OBSTETRICS AND GYNECOLOGY | Facility: CLINIC | Age: 39
End: 2024-09-20
Payer: MEDICAID

## 2024-09-20 NOTE — TELEPHONE ENCOUNTER
Called and spoke with pt regarding her request for a fertility doctor. jtn    ----- Message from Napoleon Emigdio sent at 9/20/2024 12:13 PM CDT -----  Regarding: self  Type: Patient Call Back    Who called:self    What is the request in detail:pt is asking to [facility] in this office about getting referred to someone else about getting her tubes reversed. She only wants to speak [facility] only, if not [facility] , dr moore      Can the clinic reply by [facility]?no    Would the patient rather a call back or a response via [facility]? callback    Best call back number:[phone]    Additional Information:

## 2024-10-03 ENCOUNTER — LAB VISIT (OUTPATIENT)
Dept: LAB | Facility: HOSPITAL | Age: 39
End: 2024-10-03
Attending: OBSTETRICS & GYNECOLOGY
Payer: MEDICAID

## 2024-10-03 DIAGNOSIS — Z11.3 SCREEN FOR STD (SEXUALLY TRANSMITTED DISEASE): ICD-10-CM

## 2024-10-03 DIAGNOSIS — Z86.19 HISTORY OF SYPHILIS: ICD-10-CM

## 2024-10-03 LAB
HBV SURFACE AG SERPL QL IA: NORMAL
HCV AB SERPL QL IA: NORMAL
HIV 1+2 AB+HIV1 P24 AG SERPL QL IA: REACTIVE

## 2024-10-03 PROCEDURE — 86803 HEPATITIS C AB TEST: CPT | Performed by: OBSTETRICS & GYNECOLOGY

## 2024-10-03 PROCEDURE — 87340 HEPATITIS B SURFACE AG IA: CPT | Performed by: OBSTETRICS & GYNECOLOGY

## 2024-10-03 PROCEDURE — 36415 COLL VENOUS BLD VENIPUNCTURE: CPT | Performed by: OBSTETRICS & GYNECOLOGY

## 2024-10-03 PROCEDURE — 87389 HIV-1 AG W/HIV-1&-2 AB AG IA: CPT | Mod: 91 | Performed by: OBSTETRICS & GYNECOLOGY

## 2024-10-03 PROCEDURE — 86592 SYPHILIS TEST NON-TREP QUAL: CPT | Performed by: OBSTETRICS & GYNECOLOGY

## 2024-10-03 PROCEDURE — 87389 HIV-1 AG W/HIV-1&-2 AB AG IA: CPT | Performed by: OBSTETRICS & GYNECOLOGY

## 2024-10-04 ENCOUNTER — PATIENT MESSAGE (OUTPATIENT)
Dept: OBSTETRICS AND GYNECOLOGY | Facility: CLINIC | Age: 39
End: 2024-10-04
Payer: MEDICAID

## 2024-10-04 DIAGNOSIS — Z21 HIV ANTIBODY POSITIVE: Primary | ICD-10-CM

## 2024-10-04 LAB
HIV SUPPLEMENTAL ASSAY INTERPRETATION: NORMAL
HIV-1 RESULT: NEGATIVE
HIV-2 RESULT: NEGATIVE
RPR SER QL: NORMAL

## 2024-10-07 ENCOUNTER — TELEPHONE (OUTPATIENT)
Dept: OBSTETRICS AND GYNECOLOGY | Facility: CLINIC | Age: 39
End: 2024-10-07
Payer: MEDICAID

## 2024-10-07 NOTE — TELEPHONE ENCOUNTER
Pt was called and inform that the provider will like for her to go to the lab for more testing. Pt stated she will like for Omaira to call her. Message was giving to Omaira.

## 2024-10-10 ENCOUNTER — LAB VISIT (OUTPATIENT)
Dept: LAB | Facility: HOSPITAL | Age: 39
End: 2024-10-10
Attending: OBSTETRICS & GYNECOLOGY
Payer: MEDICAID

## 2024-10-10 DIAGNOSIS — Z21 HIV ANTIBODY POSITIVE: ICD-10-CM

## 2024-10-10 PROCEDURE — 87536 HIV-1 QUANT&REVRSE TRNSCRPJ: CPT | Performed by: OBSTETRICS & GYNECOLOGY

## 2024-10-10 PROCEDURE — 36415 COLL VENOUS BLD VENIPUNCTURE: CPT | Performed by: OBSTETRICS & GYNECOLOGY

## 2024-10-14 LAB
HIV1 RNA # SERPL NAA+PROBE: NOT DETECTED COPIES/ML
HIV1 RNA SERPL QL NAA+PROBE: NOT DETECTED

## 2024-11-12 ENCOUNTER — TELEPHONE (OUTPATIENT)
Dept: OBSTETRICS AND GYNECOLOGY | Facility: CLINIC | Age: 39
End: 2024-11-12
Payer: MEDICAID

## 2024-11-12 ENCOUNTER — HOSPITAL ENCOUNTER (EMERGENCY)
Facility: HOSPITAL | Age: 39
Discharge: HOME | End: 2024-11-12
Attending: EMERGENCY MEDICINE
Payer: MEDICAID

## 2024-11-12 VITALS
HEART RATE: 64 BPM | SYSTOLIC BLOOD PRESSURE: 123 MMHG | RESPIRATION RATE: 20 BRPM | BODY MASS INDEX: 23.74 KG/M2 | OXYGEN SATURATION: 100 % | DIASTOLIC BLOOD PRESSURE: 81 MMHG | WEIGHT: 134 LBS | HEIGHT: 63 IN | TEMPERATURE: 98 F

## 2024-11-12 DIAGNOSIS — R10.9 RIGHT FLANK PAIN: Primary | ICD-10-CM

## 2024-11-12 DIAGNOSIS — N93.9 ABNORMAL UTERINE BLEEDING: ICD-10-CM

## 2024-11-12 LAB
ALBUMIN SERPL-MCNC: 4.3 G/DL (ref 3.3–5.5)
ALP SERPL-CCNC: 35 U/L (ref 42–141)
B-HCG UR QL: NEGATIVE
BILIRUB SERPL-MCNC: 0.5 MG/DL (ref 0.2–1.6)
BILIRUBIN, POC UA: NEGATIVE
BLOOD, POC UA: ABNORMAL
BUN SERPL-MCNC: 18 MG/DL (ref 7–22)
CALCIUM SERPL-MCNC: 10.5 MG/DL (ref 8–10.3)
CHLORIDE SERPL-SCNC: 110 MMOL/L (ref 98–108)
CLARITY, UA: CLEAR
COLOR, UA: YELLOW
CREAT SERPL-MCNC: 0.9 MG/DL (ref 0.6–1.2)
CTP QC/QA: YES
GLUCOSE SERPL-MCNC: 92 MG/DL (ref 73–118)
GLUCOSE, POC UA: NEGATIVE
HCT, POC: NORMAL
HGB, POC: NORMAL (ref 14–18)
KETONES, POC UA: NEGATIVE
LEUKOCYTE EST, POC UA: NEGATIVE
MCH, POC: NORMAL
MCHC, POC: NORMAL
MCV, POC: NORMAL
MPV, POC: NORMAL
NITRITE, POC UA: NEGATIVE
PH UR STRIP: 6 [PH] (ref 5–8)
POC ALT (SGPT): 12 U/L (ref 10–47)
POC AST (SGOT): 27 U/L (ref 11–38)
POC PLATELET COUNT: NORMAL
POC PTINR: 1 (ref 0.9–1.2)
POC PTWBT: 10.4 SEC (ref 9.7–14.3)
POC TCO2: 26 MMOL/L (ref 18–33)
POTASSIUM BLD-SCNC: 4.1 MMOL/L (ref 3.6–5.1)
PROTEIN, POC UA: ABNORMAL
PROTEIN, POC: 6.8 G/DL (ref 6.4–8.1)
RBC, POC: NORMAL
RDW, POC: NORMAL
SAMPLE: NORMAL
SODIUM BLD-SCNC: 141 MMOL/L (ref 128–145)
SPECIFIC GRAVITY, POC UA: 1.02 (ref 1–1.03)
UROBILINOGEN, POC UA: 0.2 E.U./DL
WBC, POC: NORMAL

## 2024-11-12 PROCEDURE — 81025 URINE PREGNANCY TEST: CPT | Mod: ER | Performed by: PHYSICIAN ASSISTANT

## 2024-11-12 PROCEDURE — 81025 URINE PREGNANCY TEST: CPT | Mod: ER

## 2024-11-12 PROCEDURE — 80053 COMPREHEN METABOLIC PANEL: CPT | Mod: ER

## 2024-11-12 PROCEDURE — 99285 EMERGENCY DEPT VISIT HI MDM: CPT | Mod: 25,ER

## 2024-11-12 PROCEDURE — 96374 THER/PROPH/DIAG INJ IV PUSH: CPT | Mod: ER

## 2024-11-12 PROCEDURE — 63600175 PHARM REV CODE 636 W HCPCS: Mod: ER | Performed by: EMERGENCY MEDICINE

## 2024-11-12 PROCEDURE — 85025 COMPLETE CBC W/AUTO DIFF WBC: CPT | Mod: ER

## 2024-11-12 RX ORDER — METHOCARBAMOL 750 MG/1
1500 TABLET, FILM COATED ORAL EVERY 6 HOURS
Qty: 24 TABLET | Refills: 0 | Status: SHIPPED | OUTPATIENT
Start: 2024-11-12 | End: 2024-11-15

## 2024-11-12 RX ORDER — KETOROLAC TROMETHAMINE 30 MG/ML
15 INJECTION, SOLUTION INTRAMUSCULAR; INTRAVENOUS
Status: COMPLETED | OUTPATIENT
Start: 2024-11-12 | End: 2024-11-12

## 2024-11-12 RX ORDER — KETOROLAC TROMETHAMINE 10 MG/1
10 TABLET, FILM COATED ORAL EVERY 6 HOURS PRN
Qty: 12 TABLET | Refills: 0 | Status: SHIPPED | OUTPATIENT
Start: 2024-11-12 | End: 2024-11-15

## 2024-11-12 RX ADMIN — KETOROLAC TROMETHAMINE 15 MG: 30 INJECTION, SOLUTION INTRAMUSCULAR at 08:11

## 2024-11-12 NOTE — TELEPHONE ENCOUNTER
Called and spoke with pt regarding her concerns.  Pt was told that she may need to go to the ER for evaluation.  An appt scheduled for pt to come in tomorrow at 4 pm. [email or web address]    ----- Message from Shasta sent at 11/12/2024  3:35 PM CST -----  Type: Patient Call Back    Who called:pt     What is the request in detail:pt calling to speak to nurse in regards to heavy bleeding, dizziness, not pregnant. No one answered the on call nurse and no one answered the [facility] escalations protocols. when I spoke to her she wanted me to stop holding for on call nurse and leave message for ms Hurtado to call her back today.    The caller accepted this outcome. for dr moore    Symptom: Vaginal Bleeding - Not Pregnant  Outcome: Transfer to a nurse or provider NOW!  Reason: Heavy bleeding    The caller accepted this outcome.    Can the clinic reply by [facility]?    Would the patient rather a call back or a response via [facility]? call    Best call back number:There are no phone numbers on file.[phone]       Additional Information:

## 2024-11-13 ENCOUNTER — HOSPITAL ENCOUNTER (EMERGENCY)
Facility: HOSPITAL | Age: 39
Discharge: HOME | End: 2024-11-13
Attending: EMERGENCY MEDICINE
Payer: MEDICAID

## 2024-11-13 VITALS
TEMPERATURE: 98 F | OXYGEN SATURATION: 100 % | WEIGHT: 137 LBS | HEART RATE: 74 BPM | SYSTOLIC BLOOD PRESSURE: 124 MMHG | RESPIRATION RATE: 18 BRPM | HEIGHT: 63 IN | DIASTOLIC BLOOD PRESSURE: 79 MMHG | BODY MASS INDEX: 24.27 KG/M2

## 2024-11-13 DIAGNOSIS — K21.00 GASTROESOPHAGEAL REFLUX DISEASE WITH ESOPHAGITIS WITHOUT HEMORRHAGE: Primary | ICD-10-CM

## 2024-11-13 DIAGNOSIS — N93.9 ABNORMAL VAGINAL BLEEDING: ICD-10-CM

## 2024-11-13 LAB
ALBUMIN SERPL BCP-MCNC: 4.5 G/DL (ref 3.5–5.2)
ALP SERPL-CCNC: 35 U/L (ref 40–150)
ALT SERPL W/O P-5'-P-CCNC: 9 U/L (ref 10–44)
ANION GAP SERPL CALC-SCNC: 8 MMOL/L (ref 8–16)
AST SERPL-CCNC: 20 U/L (ref 10–40)
B-HCG UR QL: NEGATIVE
BACTERIA #/AREA URNS HPF: ABNORMAL /HPF
BASOPHILS # BLD AUTO: 0.05 K/UL (ref 0–0.2)
BASOPHILS NFR BLD: 0.6 % (ref 0–1.9)
BILIRUB SERPL-MCNC: 0.5 MG/DL (ref 0.1–1)
BILIRUB UR QL STRIP: NEGATIVE
BUN SERPL-MCNC: 25 MG/DL (ref 6–20)
CALCIUM SERPL-MCNC: 10.4 MG/DL (ref 8.7–10.5)
CHLORIDE SERPL-SCNC: 110 MMOL/L (ref 95–110)
CLARITY UR: ABNORMAL
CO2 SERPL-SCNC: 22 MMOL/L (ref 23–29)
COLOR UR: YELLOW
CREAT SERPL-MCNC: 1.1 MG/DL (ref 0.5–1.4)
D DIMER PPP IA.FEU-MCNC: <0.19 MG/L FEU
DIFFERENTIAL METHOD BLD: ABNORMAL
EOSINOPHIL # BLD AUTO: 0.1 K/UL (ref 0–0.5)
EOSINOPHIL NFR BLD: 1.4 % (ref 0–8)
ERYTHROCYTE [DISTWIDTH] IN BLOOD BY AUTOMATED COUNT: 13.1 % (ref 11.5–14.5)
EST. GFR  (NO RACE VARIABLE): >60 ML/MIN/1.73 M^2
GLUCOSE SERPL-MCNC: 79 MG/DL (ref 70–110)
GLUCOSE UR QL STRIP: NEGATIVE
HCT VFR BLD AUTO: 40.2 % (ref 37–48.5)
HGB BLD-MCNC: 13.6 G/DL (ref 12–16)
HGB UR QL STRIP: ABNORMAL
HYALINE CASTS #/AREA URNS LPF: 1 /LPF
IMM GRANULOCYTES # BLD AUTO: 0.02 K/UL (ref 0–0.04)
IMM GRANULOCYTES NFR BLD AUTO: 0.2 % (ref 0–0.5)
KETONES UR QL STRIP: NEGATIVE
LEUKOCYTE ESTERASE UR QL STRIP: ABNORMAL
LIPASE SERPL-CCNC: 56 U/L (ref 4–60)
LYMPHOCYTES # BLD AUTO: 2.2 K/UL (ref 1–4.8)
LYMPHOCYTES NFR BLD: 25.4 % (ref 18–48)
MCH RBC QN AUTO: 31.3 PG (ref 27–31)
MCHC RBC AUTO-ENTMCNC: 33.8 G/DL (ref 32–36)
MCV RBC AUTO: 92 FL (ref 82–98)
MICROSCOPIC COMMENT: ABNORMAL
MONOCYTES # BLD AUTO: 0.4 K/UL (ref 0.3–1)
MONOCYTES NFR BLD: 4.9 % (ref 4–15)
NEUTROPHILS # BLD AUTO: 5.8 K/UL (ref 1.8–7.7)
NEUTROPHILS NFR BLD: 67.5 % (ref 38–73)
NITRITE UR QL STRIP: POSITIVE
NRBC BLD-RTO: 0 /100 WBC
PH UR STRIP: 6 [PH] (ref 5–8)
PLATELET # BLD AUTO: 232 K/UL (ref 150–450)
PMV BLD AUTO: 9.8 FL (ref 9.2–12.9)
POTASSIUM SERPL-SCNC: 4 MMOL/L (ref 3.5–5.1)
PROT SERPL-MCNC: 7 G/DL (ref 6–8.4)
PROT UR QL STRIP: ABNORMAL
RBC # BLD AUTO: 4.35 M/UL (ref 4–5.4)
RBC #/AREA URNS HPF: 5 /HPF (ref 0–4)
SODIUM SERPL-SCNC: 140 MMOL/L (ref 136–145)
SP GR UR STRIP: 1.02 (ref 1–1.03)
SQUAMOUS #/AREA URNS HPF: 3 /HPF
TROPONIN I SERPL DL<=0.01 NG/ML-MCNC: 0.01 NG/ML (ref 0–0.03)
URN SPEC COLLECT METH UR: ABNORMAL
UROBILINOGEN UR STRIP-ACNC: NEGATIVE EU/DL
WBC # BLD AUTO: 8.56 K/UL (ref 3.9–12.7)
WBC #/AREA URNS HPF: >100 /HPF (ref 0–5)
WBC CLUMPS URNS QL MICRO: ABNORMAL

## 2024-11-13 PROCEDURE — 83690 ASSAY OF LIPASE: CPT | Performed by: EMERGENCY MEDICINE

## 2024-11-13 PROCEDURE — 81025 URINE PREGNANCY TEST: CPT | Performed by: EMERGENCY MEDICINE

## 2024-11-13 PROCEDURE — 85025 COMPLETE CBC W/AUTO DIFF WBC: CPT | Performed by: EMERGENCY MEDICINE

## 2024-11-13 PROCEDURE — 85379 FIBRIN DEGRADATION QUANT: CPT | Performed by: EMERGENCY MEDICINE

## 2024-11-13 PROCEDURE — 81000 URINALYSIS NONAUTO W/SCOPE: CPT | Performed by: EMERGENCY MEDICINE

## 2024-11-13 PROCEDURE — 99284 EMERGENCY DEPT VISIT MOD MDM: CPT | Mod: 25

## 2024-11-13 PROCEDURE — 80053 COMPREHEN METABOLIC PANEL: CPT | Performed by: EMERGENCY MEDICINE

## 2024-11-13 PROCEDURE — 84484 ASSAY OF TROPONIN QUANT: CPT | Performed by: EMERGENCY MEDICINE

## 2024-11-13 RX ORDER — PANTOPRAZOLE SODIUM 20 MG/1
20 TABLET, DELAYED RELEASE ORAL DAILY
Qty: 30 TABLET | Refills: 0 | Status: SHIPPED | OUTPATIENT
Start: 2024-11-13 | End: 2025-11-13

## 2024-11-13 NOTE — ED PROVIDER NOTES
"Encounter Date: 11/12/2024    SCRIBE #1 NOTE: I, Kenan Jones, am scribing for, and in the presence of,  James Kaur MD. I have scribed the following portions of the note - Other sections scribed: HPI, ROS, PE.   SCRIBE #2 NOTE: IDeny Do, am scribing for, and in the presence of,  James Kaur MD. Other sections scribed: HPI, ROS, PE.     History     Chief Complaint   Patient presents with    Flank Pain     Right sided flank pain with severe vaginal bleeding      39 year old female with asthma, anemia without current no iron supplements, and HTN, presents to ED with acute heavy vaginal bleeding since this AM. Patient reports of nausea for "multiple weeks," dizziness for "multiple weeks," and sudden sharp right flank pain yesterday. Patient reports her last menstrual cycle was on 10/21, which resolved within 3-4 days. She reports that her vaginal bleeding restarted less than a week later, and since then she has been having intermittent vaginal spotting. Pain is aggravated with movement but not with deep inspiration. Since this AM, she reports multiple episodes of heavy vaginal bleeding with golf ball sized clots. She reports using approximately 5-10 sanitary pads today, unsure of the exact number. She called JUNO Monzon who gave her an appointment for 11/13, but also advised the patient to make an ED visit. She denies a current pregnancy, endorses 2/2 tubal ligation. She denies history of irregular cycle. She denies history of kidney stones. Patient reports adherence with gabapentin, clonidine, and adderall, reports non-adherence with her anti-hypertensive medications. She reports quitting smoking tobacco 4 days ago, does report occasionally smoking marijuana. She denies other recreational drug use. Patient denies any fevers, chest pain, SOB, abdominal pain, vomiting, diarrhea, dysuria, hematuria, frequency or vaginal discharge. PSHx of umbilical hernia repair. NKDA.    The history is provided " by the patient. No  was used.     Review of patient's allergies indicates:  No Known Allergies  Past Medical History:   Diagnosis Date    Anxiety     Asthma     as a child, no recent episodes    GERD (gastroesophageal reflux disease)     Mental disorder     Methamphetamine abuse     Migraine headache     Smoker     Suicidal ideation     Umbilical hernia     Vaginal delivery     x4     Past Surgical History:   Procedure Laterality Date    DILATION AND CURETTAGE OF UTERUS      HYSTEROSCOPY      TUBAL LIGATION      VAGINAL DELIVERY      x 4     Family History   Problem Relation Name Age of Onset    Cancer Neg Hx      Eclampsia Neg Hx       Social History     Tobacco Use    Smoking status: Every Day     Current packs/day: 1.00     Average packs/day: 1 pack/day for 5.0 years (5.0 ttl pk-yrs)     Types: Cigarettes    Smokeless tobacco: Never   Substance Use Topics    Alcohol use: Yes    Drug use: No     Review of Systems   Constitutional:  Negative for chills and fever.   Respiratory:  Negative for shortness of breath.    Cardiovascular:  Negative for chest pain.   Gastrointestinal:  Positive for nausea. Negative for abdominal pain, diarrhea and vomiting.   Genitourinary:  Positive for flank pain, menstrual problem and vaginal bleeding. Negative for dysuria, frequency, hematuria and vaginal discharge.   Neurological:  Positive for dizziness.   All other systems reviewed and are negative.      Physical Exam     Initial Vitals [11/12/24 1852]   BP Pulse Resp Temp SpO2   138/88 101 20 97.9 °F (36.6 °C) 100 %      MAP       --         Physical Exam    Nursing note and vitals reviewed.  Constitutional: She appears well-developed and well-nourished. She is not diaphoretic. No distress.   HENT:   Head: Normocephalic and atraumatic.   Right Ear: External ear normal.   Left Ear: External ear normal.   Nose: Nose normal.   Eyes: Conjunctivae and lids are normal.   Neck: Trachea normal and phonation normal. Neck  supple. No stridor present.   Normal range of motion.  Cardiovascular:  Normal rate, regular rhythm and normal heart sounds.           No murmur heard.  Pulmonary/Chest: Breath sounds normal. No accessory muscle usage or stridor. No tachypnea. No respiratory distress.   Abdominal: Abdomen is soft. There is no abdominal tenderness.   There is right CVA tenderness.  No left CVA tenderness. There is no rebound and no guarding.   Musculoskeletal:         General: Normal range of motion.      Cervical back: Normal range of motion and neck supple.      Thoracic back: No deformity or bony tenderness.      Lumbar back: No deformity or bony tenderness.      Right lower leg: No edema.      Left lower leg: No edema.     Neurological: She is alert.   Skin: Skin is warm and dry.         ED Course   Procedures  Labs Reviewed   POCT URINALYSIS W/O SCOPE - Abnormal       Result Value    Glucose, UA Negative      Bilirubin, UA Negative      Ketones, UA Negative      Spec Grav UA 1.020      Blood, UA 2+ (*)     PH, UA 6.0      Protein, UA 1+ (*)     Urobilinogen, UA 0.2      Nitrite, UA Negative      Leukocytes, UA Negative      Color, UA POC Yellow      Clarity, UA, POC Clear     POCT CMP - Abnormal    Albumin, POC 4.3      Alkaline Phosphatase, POC 35 (*)     ALT (SGPT), POC 12      AST (SGOT), POC 27      POC BUN 18      Calcium, POC 10.5 (*)     POC Chloride 110 (*)     POC Creatinine 0.9      POC Glucose 92      POC Potassium 4.1      POC Sodium 141      Bilirubin, POC 0.5      POC TCO2 26      Protein, POC 6.8     POCT URINE PREGNANCY    POC Preg Test, Ur Negative       Acceptable Yes     POCT URINALYSIS W/O SCOPE   POCT CBC    Hematocrit        Hemoglobin        RBC        WBC        MCV        MCH, POC        MCHC        RDW-CV        Platelet Count, POC        MPV       POCT CMP   POCT PROTIME-INR   ISTAT PROCEDURE    POC PTWBT 10.4      POC PTINR 1.0      Sample unknown            Imaging Results               CT Renal Stone Study ABD Pelvis WO (Final result)  Result time 11/12/24 20:38:14      Final result by Shay Mercado MD (11/12/24 20:38:14)                   Impression:      No acute intra-abdominal abnormalities identified.      Electronically signed by: Shay Mercado MD  Date:    11/12/2024  Time:    20:38               Narrative:    EXAMINATION:  CT RENAL STONE STUDY ABD PELVIS WO    CLINICAL HISTORY:  Flank pain, kidney stone suspected;    TECHNIQUE:  Low dose axial images, sagittal and coronal reformations were obtained from the lung bases to the pubic symphysis.  Contrast was not administered.    COMPARISON:  None    FINDINGS:  The visualized portion of the heart is unremarkable.  The lung bases are clear.    Liver is enlarged measuring 20 cm.  No significant focal hepatic abnormality seen on this noncontrast exam.  There is no intra-or extrahepatic biliary ductal dilatation.  The gallbladder is unremarkable.  The stomach, pancreas, spleen, and adrenal glands are unremarkable.    Kidneys show no evidence of hydronephrosis.  Left kidney is normal in size.  There is asymmetric atrophic change of the right kidney.  Ureters are difficult to track distally.  Urinary bladder and uterus are unremarkable.  No significant adnexal abnormalities are seen.  Presumed tampon is noted within the vagina.    Appendix is visualized and is unremarkable.  The visualized loops of small and large bowel show no evidence of obstruction or inflammation.  No free air or free fluid.    Aorta tapers normally.    No acute osseous abnormality identified.  L2 limbus vertebral body is noted.  Subcutaneous soft tissues show no significant abnormalities.                                       Medications   ketorolac injection 15 mg (15 mg Intravenous Given 11/12/24 2027)     Medical Decision Making  Amount and/or Complexity of Data Reviewed  Labs: ordered. Decision-making details documented in ED Course.  Radiology: ordered.  Decision-making details documented in ED Course.    Risk  Prescription drug management.    Labs Reviewed        Admission on 11/12/2024, Discharged on 11/12/2024   Component Date Value Ref Range Status    POC Preg Test, Ur 11/12/2024 Negative  Negative Final     Acceptable 11/12/2024 Yes   Final    Glucose, UA 11/12/2024 Negative  Negative Final    Bilirubin, UA 11/12/2024 Negative  Negative Final    Ketones, UA 11/12/2024 Negative  Negative Final    Spec Grav UA 11/12/2024 1.020  1.005 - 1.030 Final    Blood, UA 11/12/2024 2+ (A)  Negative Final    PH, UA 11/12/2024 6.0  5.0 - 8.0 Final    Protein, UA 11/12/2024 1+ (A)  Negative Final    Urobilinogen, UA 11/12/2024 0.2  <=1.0 E.U./dL Final    Nitrite, UA 11/12/2024 Negative  Negative Final    Leukocytes, UA 11/12/2024 Negative  Negative Final    Color, UA POC 11/12/2024 Yellow  Yellow, Straw, Monika Final    Clarity, UA, POC 11/12/2024 Clear  Clear Final    POC PTWBT 11/12/2024 10.4  9.7 - 14.3 sec Final    POC PTINR 11/12/2024 1.0  0.9 - 1.2 Final    Sample 11/12/2024 unknown   Final    Albumin, POC 11/12/2024 4.3  3.3 - 5.5 g/dL Final    Alkaline Phosphatase, POC 11/12/2024 35 (L)  42 - 141 U/L Final    ALT (SGPT), POC 11/12/2024 12  10 - 47 U/L Final    AST (SGOT), POC 11/12/2024 27  11 - 38 U/L Final    POC BUN 11/12/2024 18  7 - 22 mg/dL Final    Calcium, POC 11/12/2024 10.5 (H)  8.0 - 10.3 mg/dL Final    POC Chloride 11/12/2024 110 (H)  98 - 108 mmol/L Final    POC Creatinine 11/12/2024 0.9  0.6 - 1.2 mg/dL Final    POC Glucose 11/12/2024 92  73 - 118 mg/dL Final    POC Potassium 11/12/2024 4.1  3.6 - 5.1 mmol/L Final    POC Sodium 11/12/2024 141  128 - 145 mmol/L Final    Bilirubin, POC 11/12/2024 0.5  0.2 - 1.6 mg/dL Final    POC TCO2 11/12/2024 26  18 - 33 mmol/L Final    Protein, POC 11/12/2024 6.8  6.4 - 8.1 g/dL Final        Imaging Reviewed    Imaging Results              CT Renal Stone Study ABD Pelvis WO (Final result)  Result time  11/12/24 20:38:14      Final result by Shay Mercado MD (11/12/24 20:38:14)                   Impression:      No acute intra-abdominal abnormalities identified.      Electronically signed by: Shay Mercado MD  Date:    11/12/2024  Time:    20:38               Narrative:    EXAMINATION:  CT RENAL STONE STUDY ABD PELVIS WO    CLINICAL HISTORY:  Flank pain, kidney stone suspected;    TECHNIQUE:  Low dose axial images, sagittal and coronal reformations were obtained from the lung bases to the pubic symphysis.  Contrast was not administered.    COMPARISON:  None    FINDINGS:  The visualized portion of the heart is unremarkable.  The lung bases are clear.    Liver is enlarged measuring 20 cm.  No significant focal hepatic abnormality seen on this noncontrast exam.  There is no intra-or extrahepatic biliary ductal dilatation.  The gallbladder is unremarkable.  The stomach, pancreas, spleen, and adrenal glands are unremarkable.    Kidneys show no evidence of hydronephrosis.  Left kidney is normal in size.  There is asymmetric atrophic change of the right kidney.  Ureters are difficult to track distally.  Urinary bladder and uterus are unremarkable.  No significant adnexal abnormalities are seen.  Presumed tampon is noted within the vagina.    Appendix is visualized and is unremarkable.  The visualized loops of small and large bowel show no evidence of obstruction or inflammation.  No free air or free fluid.    Aorta tapers normally.    No acute osseous abnormality identified.  L2 limbus vertebral body is noted.  Subcutaneous soft tissues show no significant abnormalities.                                      Medications given in ED    Medications   ketorolac injection 15 mg (15 mg Intravenous Given 11/12/24 2027)         Note was created using voice recognition software. Note may have occasional typographical errors that may not have been identified and edited despite good sage initial review prior to signing.    I,  Jacob Kaur MD, personally performed the services described in this documentation. All medical record entries made by the scribe were at my direction and in my presence.  I have reviewed the chart and agree that the record reflects my personal performance and is accurate and complete.            Scribe Attestation:   Scribe #1: I performed the above scribed service and the documentation accurately describes the services I performed. I attest to the accuracy of the note.  Scribe #2: I performed the above scribed service and the documentation accurately describes the services I performed. I attest to the accuracy of the note.        ED Course as of 11/12/24 2149 Tue Nov 12, 2024 2040 DDx:Anovulatory cycle, symptomatic anemia, hypovolemia, coagulopathy, hypothyroidism, PCOS, fibroids, neoplasm, anticoagulant use, liver failure,others     [DL]   2040 Orthostatics negative [DL]   2041 Pertinent lab and imaging findings include:   White count 6.6, H and H 12.8 and 37.0, platelets 235, PTT/INR 10.4/1.0, CMP:  Calcium 10.5, chloride 110, alk-phos 35, urinalysis with 2+ hematuria, 1+ proteinuria, UPT negative, CT renal protocol negative for acute abnormality [DL]   2042 Chart review    10/12/20 04:47  Hemoglobin: 9.1 (L)  Hematocrit: 27.8 (L)    03/30/21 23:00  Hemoglobin: 13.6  Hematocrit: 42.0    05/03/22 07:31  Hemoglobin: 12.1  Hematocrit: 38.7    04/07/23 14:58  Hemoglobin: 11.4 (L)  Hematocrit: 35.2 (L)    12/11/23 12:44  Hemoglobin: 14.3  Hematocrit: 42.8      (L): Data is abnormally low [DL]      ED Course User Index  [DL] Jacob Kaur MD                 Medical Decision Making:   Clinical Tests:   Lab Tests: Ordered and Reviewed  Radiological Study: Ordered and Reviewed  ED Management:  No ongoing heavy vaginal bleeding throughout ED course.  Lab results, imaging results, outpatient management plan, outpatient PCP/OBGYN follow up and ED return precautions discussed with patient with understanding and  agreement.                  Clinical Impression:  Final diagnoses:  [R10.9] Right flank pain (Primary)  [N93.9] Abnormal uterine bleeding          ED Disposition Condition    Discharge Stable          ED Prescriptions       Medication Sig Dispense Start Date End Date Auth. Provider    methocarbamoL (ROBAXIN) 750 MG Tab Take 2 tablets (1,500 mg total) by mouth every 6 (six) hours. for 3 days 24 tablet 11/12/2024 11/15/2024 Jacob Kaur MD    ketorolac (TORADOL) 10 mg tablet Take 1 tablet (10 mg total) by mouth every 6 (six) hours as needed for Pain (take with food). 12 tablet 11/12/2024 11/15/2024 Jacob Kaur MD          Follow-up Information       Follow up With Specialties Details Why Contact Info    The nearest emergency department.  Go to  As needed, If symptoms worsen     Lyndon Mckeon MD Obstetrics and Gynecology, Obstetrics and Gynecology Go in 1 day as previously scheduled [address]  [address] [place], [place] [id number] [phone]      Your PCP  Call  As needed, for ongoing care              Jacob Kaur MD  11/12/24 [id number]

## 2024-11-14 ENCOUNTER — OFFICE VISIT (OUTPATIENT)
Dept: OBSTETRICS AND GYNECOLOGY | Facility: CLINIC | Age: 39
End: 2024-11-14
Payer: MEDICAID

## 2024-11-14 VITALS
SYSTOLIC BLOOD PRESSURE: 124 MMHG | BODY MASS INDEX: 24.5 KG/M2 | WEIGHT: 138.25 LBS | DIASTOLIC BLOOD PRESSURE: 70 MMHG | HEIGHT: 63 IN

## 2024-11-14 DIAGNOSIS — R10.9 ABDOMINAL PAIN, UNSPECIFIED ABDOMINAL LOCATION: Primary | ICD-10-CM

## 2024-11-14 DIAGNOSIS — R07.89 CHEST PAIN, MUSCULOSKELETAL: ICD-10-CM

## 2024-11-14 DIAGNOSIS — N20.0 RENAL STONE: ICD-10-CM

## 2024-11-14 DIAGNOSIS — R10.2 FEMALE PELVIC PAIN: ICD-10-CM

## 2024-11-14 LAB
OHS QRS DURATION: 76 MS
OHS QTC CALCULATION: 394 MS

## 2024-11-14 PROCEDURE — 3078F DIAST BP <80 MM HG: CPT | Mod: CPTII,,, | Performed by: OBSTETRICS & GYNECOLOGY

## 2024-11-14 PROCEDURE — 99999 PR PBB SHADOW E&M-EST. PATIENT-LVL IV: CPT | Mod: PBBFAC,,, | Performed by: OBSTETRICS & GYNECOLOGY

## 2024-11-14 PROCEDURE — 3074F SYST BP LT 130 MM HG: CPT | Mod: CPTII,,, | Performed by: OBSTETRICS & GYNECOLOGY

## 2024-11-14 PROCEDURE — 87591 N.GONORRHOEAE DNA AMP PROB: CPT | Performed by: OBSTETRICS & GYNECOLOGY

## 2024-11-14 PROCEDURE — 3008F BODY MASS INDEX DOCD: CPT | Mod: CPTII,,, | Performed by: OBSTETRICS & GYNECOLOGY

## 2024-11-14 PROCEDURE — 1159F MED LIST DOCD IN RCRD: CPT | Mod: CPTII,,, | Performed by: OBSTETRICS & GYNECOLOGY

## 2024-11-14 PROCEDURE — 1160F RVW MEDS BY RX/DR IN RCRD: CPT | Mod: CPTII,,, | Performed by: OBSTETRICS & GYNECOLOGY

## 2024-11-14 PROCEDURE — 99214 OFFICE O/P EST MOD 30 MIN: CPT | Mod: PBBFAC | Performed by: OBSTETRICS & GYNECOLOGY

## 2024-11-14 PROCEDURE — 99213 OFFICE O/P EST LOW 20 MIN: CPT | Mod: S$PBB,,, | Performed by: OBSTETRICS & GYNECOLOGY

## 2024-11-14 RX ORDER — CLONAZEPAM 2 MG/1
2 TABLET ORAL 2 TIMES DAILY PRN
COMMUNITY
Start: 2024-11-11

## 2024-11-14 RX ORDER — CYCLOBENZAPRINE HCL 5 MG
5 TABLET ORAL NIGHTLY PRN
Qty: 30 TABLET | Refills: 1 | Status: SHIPPED | OUTPATIENT
Start: 2024-11-14

## 2024-11-14 NOTE — ED PROVIDER NOTES
"Encounter Date: 11/13/2024    SCRIBE #1 NOTE: I, Yessyjodie Hudson, am scribing for, and in the presence of,  Phil Shanks MD.       History     Chief Complaint   Patient presents with    Chest Pain    Back Pain    Vaginal Bleeding     Pt c/o mid-sternal CP x several weeks, mostly while laying down. Pt states it feels like pressure. PCP states pt has a sore muscle. Pt also reports R lower back pain with frequent urination. Pt also states she had finished her cycle a few days ago and has had vaginal bleeding w/ dizziness x 2 weeks. Pt endorses being anemic. Pt also endorse frequent urination.     Urinary Frequency     39 year old female with PMHx of asthma, GERD, methamphetamine abuse, HTN presents to the ED with multiple complaints. She reports acute on chronic heavy vaginal bleeding. Reports saturating more than 5 super tampons/day. She also reports left sided CP mostly at night. Pt is a poor historian, unable to describes pain, stating "it just hurts". She denies Hx of anemia. Pt has no other complaints at this time.     Per chart review, pt seen at [facility] last night with acute heavy vaginal bleeding and flank pain. Also reported nausea, dizziness for weeks. She had R CVA TTP on exam. CT negative for renal stone. Discharged home with toradol and robaxin Rx.      The history is provided by the patient. No  was used.     Review of patient's allergies indicates:  No Known Allergies  Past Medical History:   Diagnosis Date    Anxiety     Asthma     as a child, no recent episodes    GERD (gastroesophageal reflux disease)     Mental disorder     Methamphetamine abuse     Migraine headache     Smoker     Suicidal ideation     Umbilical hernia     Vaginal delivery     x4     Past Surgical History:   Procedure Laterality Date    DILATION AND CURETTAGE OF UTERUS      HYSTEROSCOPY      TUBAL LIGATION      VAGINAL DELIVERY      x 4     Family History   Problem Relation Name Age of Onset    Cancer " Neg Hx      Eclampsia Neg Hx       Social History     Tobacco Use    Smoking status: Every Day     Current packs/day: 1.00     Average packs/day: 1 pack/day for 5.0 years (5.0 ttl pk-yrs)     Types: Cigarettes    Smokeless tobacco: Never   Substance Use Topics    Alcohol use: Yes    Drug use: No     Review of Systems   Constitutional: Negative.    HENT: Negative.     Eyes: Negative.    Respiratory: Negative.     Cardiovascular:  Positive for chest pain.   Gastrointestinal: Negative.    Endocrine: Negative.    Genitourinary:  Positive for vaginal bleeding.   Musculoskeletal: Negative.    Skin: Negative.    Allergic/Immunologic: Negative.    Neurological: Negative.    Hematological: Negative.    Psychiatric/Behavioral: Negative.         Physical Exam     Initial Vitals [11/13/24 1939]   BP Pulse Resp Temp SpO2   126/77 87 18 98 °F (36.7 °C) 100 %      MAP       --         Physical Exam    Nursing note reviewed.  Constitutional: She appears well-developed and well-nourished.   HENT:   Head: Normocephalic and atraumatic.   Eyes: Conjunctivae are normal.   Neck: Neck supple. No thyroid mass present.   Cardiovascular:  Normal rate, regular rhythm, S1 normal, S2 normal, normal heart sounds, intact distal pulses and normal pulses.    PMI is not displaced.        Pulmonary/Chest: Effort normal and breath sounds normal. No respiratory distress.   Abdominal: Abdomen is soft. Bowel sounds are normal.   Genitourinary:       Musculoskeletal:         General: No tenderness. Normal range of motion.      Cervical back: Neck supple.     Lymphadenopathy:     She has no axillary adenopathy.   Neurological: She is alert and oriented to person, place, and time. GCS eye subscore is 4. GCS verbal subscore is 5. GCS motor subscore is 6.   Skin: Skin is warm, dry and intact.   Psychiatric: She has a normal mood and affect. Her speech is normal. Judgment normal. Cognition and memory are normal.         ED Course   Procedures  Labs Reviewed    CBC W/ AUTO DIFFERENTIAL - Abnormal       Result Value    WBC 8.56      RBC 4.35      Hemoglobin 13.6      Hematocrit 40.2      MCV 92      MCH 31.3 (*)     MCHC 33.8      RDW 13.1      Platelets 232      MPV 9.8      Immature Granulocytes 0.2      Gran # (ANC) 5.8      Immature Grans (Abs) 0.02      Lymph # 2.2      Mono # 0.4      Eos # 0.1      Baso # 0.05      nRBC 0      Gran % 67.5      Lymph % 25.4      Mono % 4.9      Eosinophil % 1.4      Basophil % 0.6      Differential Method Automated     COMPREHENSIVE METABOLIC PANEL - Abnormal    Sodium 140      Potassium 4.0      Chloride 110      CO2 22 (*)     Glucose 79      BUN 25 (*)     Creatinine 1.1      Calcium 10.4      Total Protein 7.0      Albumin 4.5      Total Bilirubin 0.5      Alkaline Phosphatase 35 (*)     AST 20      ALT 9 (*)     eGFR >60      Anion Gap 8     URINALYSIS - Abnormal    Specimen UA Urine, Clean Catch      Color, UA Yellow      Appearance, UA Hazy (*)     pH, UA 6.0      Specific Gravity, UA 1.025      Protein, UA 2+ (*)     Glucose, UA Negative      Ketones, UA Negative      Bilirubin (UA) Negative      Occult Blood UA 2+ (*)     Nitrite, UA Positive (*)     Urobilinogen, UA Negative      Leukocytes, UA 3+ (*)    URINALYSIS MICROSCOPIC - Abnormal    RBC, UA 5 (*)     WBC, UA >100 (*)     WBC Clumps, UA Rare      Bacteria Occasional      Squam Epithel, UA 3      Hyaline Casts, UA 1      Microscopic Comment SEE COMMENT     TROPONIN I    Troponin I 0.009     LIPASE    Lipase 56     PREGNANCY TEST, URINE RAPID    Preg Test, Ur Negative      Narrative:     Specimen Source->Urine   D DIMER, QUANTITATIVE    D-Dimer <0.19            Imaging Results              X-Ray Chest AP Portable (In process)  Result time 11/13/24 20:59:39                  X-Rays:   Independently Interpreted Readings:   Other Readings:  Chest x-ray is normal from my interpretation.    Medications - No data to display  Medical Decision Making  Patient with recurrent  vaginal bleeding over the course of last several weeks.  Seen by her obstetrician gynecologist as well as by emergency department personnel.  Patient's H&H is stable at this point in time with no evidence of any significant anemia.  Additionally patient's cervix on examination was long thick and closed and she has UPT negative.  In regard to this patient's chest pain feel that her symptoms are consistent with a reflux esophagitis of course I considered other GI  musculoskeletal cardiopulmonary etiologies.    Amount and/or Complexity of Data Reviewed  Labs: ordered. Decision-making details documented in ED Course.  Radiology: ordered.    Risk  Prescription drug management.            Scribe Attestation:   Scribe #1: I performed the above scribed service and the documentation accurately describes the services I performed. I attest to the accuracy of the note.                           This document was produced by a scribe under my direction and in my presence. I agree with the content of the note and have made any necessary edits.     Phil Shanks MD    11/13/2024 9:04 PM      Clinical Impression:  Final diagnoses:  [K21.00] Gastroesophageal reflux disease with esophagitis without hemorrhage (Primary)  [N93.9] Abnormal vaginal bleeding          ED Disposition Condition    Discharge Stable          ED Prescriptions       Medication Sig Dispense Start Date End Date Auth. Provider    pantoprazole (PROTONIX) 20 MG tablet Take 1 tablet (20 mg total) by mouth once daily. 30 tablet 11/13/2024 11/13/2025 Phil Shanks MD    norgestrel-ethinyl estradioL (LO/OVRAL) 0.3-30 mg-mcg per tablet Take 1 tablet by mouth once daily. 30 tablet 11/13/2024 12/13/2024 Phil Shanks MD          Follow-up Information       Follow up With Specialties Details Why Contact Info    Lyndon Garcia MD Family Medicine Schedule an appointment as soon as possible for a visit in 1 week  [address] [place] [address]  [id number]  [phone]               Phil Shanks MD  11/13/24 2105       Phil Shanks MD  11/13/24 2107       Phil Shanks MD  11/13/24 2112     Head is atraumatic. Head shape is symmetrical.

## 2024-11-14 NOTE — PROGRESS NOTES
Subjective     Patient ID: Karen Mccall is a 39 y.o. female.    Chief Complaint:  Hospital Follow Up (Pt was seen in ER for pelvic pain and irregular bleeding. Pt was given OCP, Torodal and pantoprazol to help with bleeding and pain.)      History of Present Illness  HPI  Patient comes in today for abdominal and pelvic pain  Was in our ED yesterday.  Work up including CT scan which was normal  She was then discharged home    Pain is more on right flank.  No urinary symptoms.  No GI symptoms  Also with irregular and heavy menses.    GYN & OB History  Patient's last menstrual period was 10/21/2024 (approximate).   Date of Last Pap: 2024    OB History    Para Term  AB Living   5 4 4   1 4   SAB IAB Ectopic Multiple Live Births   1     0 4      # Outcome Date GA Lbr Kemar/2nd Weight Sex Type Anes PTL Lv   5 Term 14 39w2d  2.432 kg (5 lb 5.8 oz) F Vag-Spont EPI N MAYDA   4 Term 10/03/12 39w0d  3.164 kg (6 lb 15.6 oz) M INDUCTION EPI N MAYDA   3 Term 05   3.317 kg (7 lb 5 oz) M Vag-Spont EPI N MAYDA   2 Term 03   3.317 kg (7 lb 5 oz) M Vag-Spont EPI N MAYDA   1 SAB 02             Past Medical History:   Diagnosis Date    Anxiety     Asthma     as a child, no recent episodes    GERD (gastroesophageal reflux disease)     Mental disorder     Methamphetamine abuse     Migraine headache     Smoker     Suicidal ideation     Umbilical hernia     Vaginal delivery     x4       Past Surgical History:   Procedure Laterality Date    DILATION AND CURETTAGE OF UTERUS      HYSTEROSCOPY      TUBAL LIGATION      VAGINAL DELIVERY      x 4       Family History   Problem Relation Name Age of Onset    Cancer Neg Hx      Eclampsia Neg Hx         Social History     Socioeconomic History    Marital status: Single   Tobacco Use    Smoking status: Every Day     Current packs/day: 1.00     Average packs/day: 1 pack/day for 5.0 years (5.0 ttl pk-yrs)     Types: Cigarettes    Smokeless tobacco: Never   Substance and  Sexual Activity    Alcohol use: Yes    Drug use: No    Sexual activity: Yes     Partners: Male     Birth control/protection: None   Social History Narrative    Together since summer 2024    She is a homemaker.           Current Outpatient Medications   Medication Sig Dispense Refill    albuterol (PROVENTIL/VENTOLIN HFA) 90 mcg/actuation inhaler INHALE TWO PUFFS BY MOUTH EVERY 6 HOURS AS NEEDED FOR WHEEZING 8.5 g 1    amLODIPine (NORVASC) 10 MG tablet TAKE 1 TABLET EVERY DAY BY MOUTH      busPIRone (BUSPAR) 15 MG tablet Take 1 tablet (15 mg total) by mouth 2 (two) times daily. 60 tablet 5    butalbital-acetaminophen-caffeine -40 mg (FIORICET, ESGIC) -40 mg per tablet 1 tab PO 1-2 times a day prn headache 45 tablet 0    clonazePAM (KLONOPIN) 2 MG Tab Take 2 mg by mouth 2 (two) times daily as needed.      dextroamphetamine-amphetamine (ADDERALL) 20 mg tablet Take 1 tablet by mouth 2 (two) times daily.      fluticasone propionate (FLONASE) 50 mcg/actuation nasal spray 1-2 sprays ( mcg total) by Each Nostril route once daily. 16 g 2    gabapentin (NEURONTIN) 300 MG capsule take ONE CAPSULE BY MOUTH TWICE DAILY 60 capsule 5    HYDROcodone-acetaminophen (NORCO) 5-325 mg per tablet Take 1 tablet by mouth daily as needed for Pain. 30 tablet 0    ketorolac (TORADOL) 10 mg tablet Take 1 tablet (10 mg total) by mouth every 6 (six) hours as needed for Pain (take with food). 12 tablet 0    meclizine (ANTIVERT) 25 mg tablet Take 1 tablet (25 mg total) by mouth 3 (three) times daily as needed for Dizziness. 30 tablet 0    meloxicam (MOBIC) 15 MG tablet Take 1 tablet (15 mg total) by mouth once daily. TAKE WITH FOOD 30 tablet 1    methocarbamoL (ROBAXIN) 750 MG Tab Take 2 tablets (1,500 mg total) by mouth every 6 (six) hours. for 3 days 24 tablet 0    norgestrel-ethinyl estradioL (LO/OVRAL) 0.3-30 mg-mcg per tablet Take 1 tablet by mouth once daily. 30 tablet 0    ondansetron (ZOFRAN-ODT) 4 MG TbDL Take 1 tablet (4  mg total) by mouth every 8 (eight) hours as needed (nausea). 30 tablet 3    OXcarbazepine (TRILEPTAL) 600 MG Tab Take 1 tablet (600 mg total) by mouth 2 (two) times daily. 60 tablet 5    pantoprazole (PROTONIX) 20 MG tablet Take 1 tablet (20 mg total) by mouth once daily. 30 tablet 0    paroxetine (PAXIL) 20 MG tablet take ONE TABLET BY MOUTH every morning 30 tablet 1    promethazine-dextromethorphan (PROMETHAZINE-DM) 6.25-15 mg/5 mL Syrp 1 teaspoon PO Q 8 hrs PRN cough. DO NOT DRIVE AFTER TAKING  mL 0    QUEtiapine (SEROQUEL) 100 MG Tab Take 100 mg by mouth nightly.      topiramate (TOPAMAX) 25 MG tablet TAKE ONE TABLET BY MOUTH TWICE DAILY 60 tablet 5    traMADoL (ULTRAM) 50 mg tablet Take 1 tablet (50 mg total) by mouth every 12 (twelve) hours as needed for Pain. 30 tablet 0    cyclobenzaprine (FLEXERIL) 5 MG tablet Take 1 tablet (5 mg total) by mouth nightly as needed. 30 tablet 1     No current facility-administered medications for this visit.       Review of patient's allergies indicates:  No Known Allergies    Review of Systems  Review of Systems   Constitutional:  Negative for activity change, appetite change, chills, fatigue, fever and unexpected weight change.   HENT:  Negative for mouth sores.    Respiratory:  Negative for cough, shortness of breath and wheezing.    Cardiovascular:  Negative for chest pain and palpitations.   Gastrointestinal:  Positive for abdominal pain. Negative for bloating, blood in stool, constipation, nausea and vomiting.   Endocrine: Negative for diabetes and hot flashes.   Genitourinary:  Positive for menorrhagia, menstrual problem and pelvic pain. Negative for dysmenorrhea, dyspareunia, dysuria, frequency, hematuria, urgency, vaginal bleeding, vaginal discharge, vaginal pain, urinary incontinence, postcoital bleeding and vaginal odor.   Musculoskeletal:  Negative for back pain and myalgias.        Right flank pain   Integumentary:  Negative for rash, breast mass and  nipple discharge.   Neurological:  Negative for seizures and headaches.   Psychiatric/Behavioral:  Negative for depression and sleep disturbance. The patient is not nervous/anxious.    Breast: Negative for mass, mastodynia and nipple discharge         Objective   Physical Exam:   Constitutional: She appears well-developed and well-nourished. No distress.   BMI of 24.49    HENT:   Head: Normocephalic and atraumatic.    Eyes: EOM are normal.      Pulmonary/Chest: Effort normal. No respiratory distress.   Breasts: Non-tender, no engorgement, no masses, no retraction, no discharge. Negative for lymphadenopathy.         Abdominal: Soft. She exhibits no distension. There is no abdominal tenderness. There is no rebound and no guarding.   Tenderness over right flank     Genitourinary:    Vagina and uterus normal.   No vaginal discharge in the vagina.    Genitourinary Comments: Vulva without any obvious lesions.  Urethral meatus normal size and location without any lesion.  Urethra is non-tender without stricture or discharge.  Bladder is non-tender.  Vaginal vault with good support.  Minimal white discharge noted.  No obvious lesion.  Normal rugation.  Cervix is without any cervical motion tenderness.  No obvious lesion.  Uterus is small, non-tender, normal contour.  Adnexa is without any masses or tenderness.  Perineum without obvious lesion.               Musculoskeletal: Normal range of motion.      Comments: No CVAT       Neurological: She is alert.    Skin: Skin is warm and dry.    Psychiatric: She has a normal mood and affect.            Assessment and Plan     1. Abdominal pain, unspecified abdominal location    2. Female pelvic pain    3. Renal stone    4. Chest pain, musculoskeletal           Plan:  I have discussed with the patient regarding her condition.  Most likely, her pain is musculoskeletal in origin  Pelvic ultrasound per request  Renal ultrasound to rule out stones though this is less likely given the  results of the CT scan yesterday  GC, CT  Flexeril for pain   Back after ultrasound    ** A female chaperone, Genesis Poon, was present for the pelvic exam

## 2024-11-14 NOTE — ED TRIAGE NOTES
Pt arrived via ED reporting non radiating, aching, left chest sided CP that has been going on for months normally occurring while laying down at night that is associated with SOB. Pt additionally states she has been having her period a week longer than normal with clots. Pt in no distress currently. HOB elevated, SR up x 2, call light within reach. Boyfriend at bedside
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
## 2024-11-16 LAB
C TRACH DNA SPEC QL NAA+PROBE: NOT DETECTED
N GONORRHOEA DNA SPEC QL NAA+PROBE: NOT DETECTED

## 2024-11-19 ENCOUNTER — HOSPITAL ENCOUNTER (EMERGENCY)
Facility: HOSPITAL | Age: 39
Discharge: HOME | End: 2024-11-20
Attending: STUDENT IN AN ORGANIZED HEALTH CARE EDUCATION/TRAINING PROGRAM
Payer: MEDICAID

## 2024-11-19 DIAGNOSIS — N93.9 VAGINAL BLEEDING: Primary | ICD-10-CM

## 2024-11-19 DIAGNOSIS — R42 LIGHTHEADEDNESS: ICD-10-CM

## 2024-11-19 LAB
ALBUMIN SERPL BCP-MCNC: 4.3 G/DL (ref 3.5–5.2)
ALP SERPL-CCNC: 34 U/L (ref 40–150)
ALT SERPL W/O P-5'-P-CCNC: 105 U/L (ref 10–44)
ANION GAP SERPL CALC-SCNC: 9 MMOL/L (ref 8–16)
APTT PPP: <21 SEC (ref 21–32)
AST SERPL-CCNC: 236 U/L (ref 10–40)
B-HCG UR QL: NEGATIVE
BACTERIA #/AREA URNS HPF: ABNORMAL /HPF
BASOPHILS # BLD AUTO: 0.04 K/UL (ref 0–0.2)
BASOPHILS NFR BLD: 0.6 % (ref 0–1.9)
BILIRUB SERPL-MCNC: 0.4 MG/DL (ref 0.1–1)
BILIRUB UR QL STRIP: NEGATIVE
BUN SERPL-MCNC: 20 MG/DL (ref 6–20)
CALCIUM SERPL-MCNC: 9.7 MG/DL (ref 8.7–10.5)
CHLORIDE SERPL-SCNC: 110 MMOL/L (ref 95–110)
CLARITY UR: ABNORMAL
CO2 SERPL-SCNC: 22 MMOL/L (ref 23–29)
COLOR UR: YELLOW
CREAT SERPL-MCNC: 1 MG/DL (ref 0.5–1.4)
CTP QC/QA: YES
DIFFERENTIAL METHOD BLD: ABNORMAL
EOSINOPHIL # BLD AUTO: 0 K/UL (ref 0–0.5)
EOSINOPHIL NFR BLD: 0.6 % (ref 0–8)
ERYTHROCYTE [DISTWIDTH] IN BLOOD BY AUTOMATED COUNT: 12.5 % (ref 11.5–14.5)
EST. GFR  (NO RACE VARIABLE): >60 ML/MIN/1.73 M^2
GLUCOSE SERPL-MCNC: 83 MG/DL (ref 70–110)
GLUCOSE UR QL STRIP: NEGATIVE
HCT VFR BLD AUTO: 34.7 % (ref 37–48.5)
HGB BLD-MCNC: 11.5 G/DL (ref 12–16)
HGB UR QL STRIP: ABNORMAL
HYALINE CASTS #/AREA URNS LPF: 0 /LPF
IMM GRANULOCYTES # BLD AUTO: 0.02 K/UL (ref 0–0.04)
IMM GRANULOCYTES NFR BLD AUTO: 0.3 % (ref 0–0.5)
INR PPP: 0.9 (ref 0.8–1.2)
KETONES UR QL STRIP: NEGATIVE
LEUKOCYTE ESTERASE UR QL STRIP: ABNORMAL
LYMPHOCYTES # BLD AUTO: 1.8 K/UL (ref 1–4.8)
LYMPHOCYTES NFR BLD: 25.3 % (ref 18–48)
MCH RBC QN AUTO: 30.4 PG (ref 27–31)
MCHC RBC AUTO-ENTMCNC: 33.1 G/DL (ref 32–36)
MCV RBC AUTO: 92 FL (ref 82–98)
MICROSCOPIC COMMENT: ABNORMAL
MONOCYTES # BLD AUTO: 0.3 K/UL (ref 0.3–1)
MONOCYTES NFR BLD: 4.5 % (ref 4–15)
NEUTROPHILS # BLD AUTO: 4.8 K/UL (ref 1.8–7.7)
NEUTROPHILS NFR BLD: 68.7 % (ref 38–73)
NITRITE UR QL STRIP: NEGATIVE
NRBC BLD-RTO: 0 /100 WBC
PH UR STRIP: 7 [PH] (ref 5–8)
PLATELET # BLD AUTO: 201 K/UL (ref 150–450)
PMV BLD AUTO: 10.5 FL (ref 9.2–12.9)
POTASSIUM SERPL-SCNC: 4 MMOL/L (ref 3.5–5.1)
PROT SERPL-MCNC: 6.9 G/DL (ref 6–8.4)
PROT UR QL STRIP: ABNORMAL
PROTHROMBIN TIME: 10.4 SEC (ref 9–12.5)
RBC # BLD AUTO: 3.78 M/UL (ref 4–5.4)
RBC #/AREA URNS HPF: >100 /HPF (ref 0–4)
SODIUM SERPL-SCNC: 141 MMOL/L (ref 136–145)
SP GR UR STRIP: 1.02 (ref 1–1.03)
SQUAMOUS #/AREA URNS HPF: 7 /HPF
URN SPEC COLLECT METH UR: ABNORMAL
UROBILINOGEN UR STRIP-ACNC: NEGATIVE EU/DL
WBC # BLD AUTO: 6.96 K/UL (ref 3.9–12.7)
WBC #/AREA URNS HPF: 46 /HPF (ref 0–5)

## 2024-11-19 PROCEDURE — 81000 URINALYSIS NONAUTO W/SCOPE: CPT

## 2024-11-19 PROCEDURE — 96372 THER/PROPH/DIAG INJ SC/IM: CPT | Performed by: STUDENT IN AN ORGANIZED HEALTH CARE EDUCATION/TRAINING PROGRAM

## 2024-11-19 PROCEDURE — 99285 EMERGENCY DEPT VISIT HI MDM: CPT | Mod: 25

## 2024-11-19 PROCEDURE — 87086 URINE CULTURE/COLONY COUNT: CPT

## 2024-11-19 PROCEDURE — 25000003 PHARM REV CODE 250: Performed by: STUDENT IN AN ORGANIZED HEALTH CARE EDUCATION/TRAINING PROGRAM

## 2024-11-19 PROCEDURE — 93005 ELECTROCARDIOGRAM TRACING: CPT

## 2024-11-19 PROCEDURE — 80053 COMPREHEN METABOLIC PANEL: CPT

## 2024-11-19 PROCEDURE — 63600175 PHARM REV CODE 636 W HCPCS: Performed by: STUDENT IN AN ORGANIZED HEALTH CARE EDUCATION/TRAINING PROGRAM

## 2024-11-19 PROCEDURE — 87186 SC STD MICRODIL/AGAR DIL: CPT

## 2024-11-19 PROCEDURE — 85730 THROMBOPLASTIN TIME PARTIAL: CPT | Performed by: STUDENT IN AN ORGANIZED HEALTH CARE EDUCATION/TRAINING PROGRAM

## 2024-11-19 PROCEDURE — 85025 COMPLETE CBC W/AUTO DIFF WBC: CPT

## 2024-11-19 PROCEDURE — 85610 PROTHROMBIN TIME: CPT | Performed by: STUDENT IN AN ORGANIZED HEALTH CARE EDUCATION/TRAINING PROGRAM

## 2024-11-19 PROCEDURE — 87088 URINE BACTERIA CULTURE: CPT

## 2024-11-19 PROCEDURE — 96374 THER/PROPH/DIAG INJ IV PUSH: CPT

## 2024-11-19 PROCEDURE — 81025 URINE PREGNANCY TEST: CPT

## 2024-11-19 PROCEDURE — 96361 HYDRATE IV INFUSION ADD-ON: CPT

## 2024-11-19 PROCEDURE — 93010 ELECTROCARDIOGRAM REPORT: CPT | Mod: ,,, | Performed by: INTERNAL MEDICINE

## 2024-11-19 RX ORDER — MEDROXYPROGESTERONE ACETATE 10 MG/1
10 TABLET ORAL DAILY
Qty: 10 TABLET | Refills: 0 | Status: SHIPPED | OUTPATIENT
Start: 2024-11-19 | End: 2024-11-29

## 2024-11-19 RX ORDER — ACETAMINOPHEN 500 MG
1000 TABLET ORAL
Status: DISCONTINUED | OUTPATIENT
Start: 2024-11-19 | End: 2024-11-19

## 2024-11-19 RX ORDER — HYDROCODONE BITARTRATE AND ACETAMINOPHEN 5; 325 MG/1; MG/1
1 TABLET ORAL
Status: COMPLETED | OUTPATIENT
Start: 2024-11-19 | End: 2024-11-19

## 2024-11-19 RX ORDER — MEDROXYPROGESTERONE ACETATE 150 MG/ML
150 INJECTION, SUSPENSION INTRAMUSCULAR ONCE
Status: COMPLETED | OUTPATIENT
Start: 2024-11-19 | End: 2024-11-19

## 2024-11-19 RX ORDER — ONDANSETRON HYDROCHLORIDE 2 MG/ML
4 INJECTION, SOLUTION INTRAVENOUS
Status: COMPLETED | OUTPATIENT
Start: 2024-11-19 | End: 2024-11-19

## 2024-11-19 RX ADMIN — SODIUM CHLORIDE, POTASSIUM CHLORIDE, SODIUM LACTATE AND CALCIUM CHLORIDE 1000 ML: 600; 310; 30; 20 INJECTION, SOLUTION INTRAVENOUS at 06:11

## 2024-11-19 RX ADMIN — ONDANSETRON 4 MG: 2 INJECTION INTRAMUSCULAR; INTRAVENOUS at 06:11

## 2024-11-19 RX ADMIN — HYDROCODONE BITARTRATE AND ACETAMINOPHEN 1 TABLET: 5; 325 TABLET ORAL at 09:11

## 2024-11-19 RX ADMIN — MEDROXYPROGESTERONE ACETATE 150 MG: 150 INJECTION, SUSPENSION, EXTENDED RELEASE INTRAMUSCULAR at 11:11

## 2024-11-19 NOTE — ED PROVIDER NOTES
Encounter Date: 11/19/2024    SCRIBE #1 NOTE: I, Brisa Villalobos, am scribing for, and in the presence of,  Phil Rocha MD. I have scribed the following portions of the note - Other sections scribed: HPI, ROS, PE.       History     Chief Complaint   Patient presents with    Vaginal Bleeding     Pt c/o vaginal bleeding intermittently x1 month. Pt stated she was evaluated and stated she was given a prescription for birth control and Methocarbamol. Pt stated she got dizzy today and felt faint but did not have a LOC.     39-year-old female, with a PMHx of tubal ligation, presents to the ED for evaluation of intermittent vaginal bleeding onset 10/21/2024. Patient reports associated dizziness, lightheadedness, hip pain, and right-sided abdominal pain. She reports her vaginal bleeding stopped for 4 days and then began again. Patient states she goes through 1 whole box of super tampons per day and changes them every 15-20 minutes. She states she put on a panty liner this morning and blood clots gushed out onto it today. Patient reports she was last saw her OB 4 days ago and was prescribed Methocarbamol and Cryselle, which she took without improvement. She states she typically has regular menstrual cycles.     The history is provided by the patient. No  was used.     Review of patient's allergies indicates:  No Known Allergies  Past Medical History:   Diagnosis Date    Anxiety     Asthma     as a child, no recent episodes    GERD (gastroesophageal reflux disease)     Mental disorder     Methamphetamine abuse     Migraine headache     Smoker     Suicidal ideation     Umbilical hernia     Vaginal delivery     x4     Past Surgical History:   Procedure Laterality Date    DILATION AND CURETTAGE OF UTERUS      HYSTEROSCOPY      TUBAL LIGATION      VAGINAL DELIVERY      x 4     Family History   Problem Relation Name Age of Onset    Cancer Neg Hx      Eclampsia Neg Hx       Social History     Tobacco Use     Smoking status: Every Day     Current packs/day: 1.00     Average packs/day: 1 pack/day for 5.0 years (5.0 ttl pk-yrs)     Types: Cigarettes    Smokeless tobacco: Never   Substance Use Topics    Alcohol use: Yes    Drug use: No     Review of Systems   Constitutional:  Negative for diaphoresis, fatigue and unexpected weight change.   HENT:  Negative for sinus pain and sore throat.    Eyes:  Negative for pain, redness and visual disturbance.   Respiratory:  Negative for cough, chest tightness, shortness of breath and wheezing.    Cardiovascular:  Negative for chest pain and palpitations.   Gastrointestinal:  Positive for abdominal pain (right-sided). Negative for blood in stool, diarrhea, nausea and vomiting.   Endocrine: Negative for polydipsia, polyphagia and polyuria.   Genitourinary:  Positive for vaginal bleeding. Negative for dysuria, frequency and urgency.   Musculoskeletal:  Positive for arthralgias (hip pain). Negative for back pain and myalgias.   Skin:  Negative for rash.   Allergic/Immunologic: Negative for environmental allergies.   Neurological:  Positive for dizziness and light-headedness. Negative for syncope.   Psychiatric/Behavioral:  Negative for suicidal ideas.    All other systems reviewed and are negative.      Physical Exam     Initial Vitals [11/19/24 1725]   BP Pulse Resp Temp SpO2   129/72 89 17 99.3 °F (37.4 °C) 100 %      MAP       --         Physical Exam    Constitutional: Vital signs are normal. She appears well-developed and well-nourished.  Non-toxic appearance. She does not have a sickly appearance. She does not appear ill.   HENT:   Head: Normocephalic and atraumatic. Mouth/Throat: Mucous membranes are normal.   Eyes: EOM are normal.   Neck: Neck supple.   Cardiovascular:  Normal rate, regular rhythm and normal heart sounds.           Pulmonary/Chest: Breath sounds normal.   Abdominal: Abdomen is soft. Bowel sounds are normal. She exhibits no distension. There is no abdominal  tenderness.   Musculoskeletal:      Cervical back: Neck supple.     Neurological: She is alert.   Skin: Skin is warm and dry. No rash noted.   Psychiatric: She has a normal mood and affect.         ED Course   Procedures  Labs Reviewed   URINALYSIS, REFLEX TO URINE CULTURE - Abnormal       Result Value    Specimen UA Urine, Clean Catch      Color, UA Yellow      Appearance, UA Hazy (*)     pH, UA 7.0      Specific Gravity, UA 1.020      Protein, UA 1+ (*)     Glucose, UA Negative      Ketones, UA Negative      Bilirubin (UA) Negative      Occult Blood UA 3+ (*)     Nitrite, UA Negative      Urobilinogen, UA Negative      Leukocytes, UA 2+ (*)     Narrative:     Specimen Source->Urine   CBC W/ AUTO DIFFERENTIAL - Abnormal    WBC 6.96      RBC 3.78 (*)     Hemoglobin 11.5 (*)     Hematocrit 34.7 (*)     MCV 92      MCH 30.4      MCHC 33.1      RDW 12.5      Platelets 201      MPV 10.5      Immature Granulocytes 0.3      Gran # (ANC) 4.8      Immature Grans (Abs) 0.02      Lymph # 1.8      Mono # 0.3      Eos # 0.0      Baso # 0.04      nRBC 0      Gran % 68.7      Lymph % 25.3      Mono % 4.5      Eosinophil % 0.6      Basophil % 0.6      Differential Method Automated     COMPREHENSIVE METABOLIC PANEL - Abnormal    Sodium 141      Potassium 4.0      Chloride 110      CO2 22 (*)     Glucose 83      BUN 20      Creatinine 1.0      Calcium 9.7      Total Protein 6.9      Albumin 4.3      Total Bilirubin 0.4      Alkaline Phosphatase 34 (*)      (*)      (*)     eGFR >60      Anion Gap 9     URINALYSIS MICROSCOPIC - Abnormal    RBC, UA >100 (*)     WBC, UA 46 (*)     Bacteria Moderate (*)     Squam Epithel, UA 7      Hyaline Casts, UA 0      Microscopic Comment SEE COMMENT      Narrative:     Specimen Source->Urine   CULTURE, URINE   PROTIME-INR    Prothrombin Time 10.4      INR 0.9     APTT    aPTT <21.0     POCT URINE PREGNANCY    POC Preg Test, Ur Negative       Acceptable Yes             Imaging Results              US Pelvis Comp with Transvag NON-OB (xpd) (Final result)  Result time 11/19/24 23:10:51   Procedure changed from US Pelvis Complete Non OB     Final result by Gabrielle Rivera MD (11/19/24 23:10:51)                   Impression:      No acute pelvic abnormality detected.      Electronically signed by: Gabrielle Rivera  Date:    11/19/2024  Time:    23:10               Narrative:    EXAMINATION:  PELVIC ULTRASOUND    CLINICAL HISTORY:  vaginal bleeding;    TECHNIQUE:  Real-time ultrasound of the pelvis was performed transabdominally and transvaginally.    COMPARISON:  10/09/2020    FINDINGS:  The retroverted uterus measures 7.8 x 4.2 x 5.4 cm.  The endometrial stripe measures 5.5 mm.  There are no uterine masses.    The right ovary measures 3 x 1.6 x 1.1 cm.  Vascular flow is present.    The left ovary measures 2.9 x 2.2 x 1.4 cm. Vascular flow is present.    There is no free fluid in the pelvis.                                       Medications   medroxyPROGESTERone (DEPO-PROVERA) injection 150 mg (has no administration in time range)   ondansetron injection 4 mg (4 mg Intravenous Given 11/19/24 1850)   lactated ringers bolus 1,000 mL (0 mLs Intravenous Stopped 11/19/24 2040)   HYDROcodone-acetaminophen 5-325 mg per tablet 1 tablet (1 tablet Oral Given 11/19/24 2136)     Medical Decision Making  Amount and/or Complexity of Data Reviewed  Labs: ordered.    Risk  Prescription drug management.    /72, HR 89, RR 17, SpO2 100%, T 99.3°   Patient is somewhat uncomfortable appearing but has a benign abdominal exam  Hemoglobin of 11 when she was a 2 point drop compared to 6 days ago   No indication for transfusion   Given 1 L of fluids and Zofran   AST of 230, ALT of 100 which is an increase compared to prior; she admits that she had a recent alcohol binge, this is a possible explanation   Had a CT scan when evaluated here last week that was unremarkable for any significant  pathology  Spoke with [facility] who recommends a pelvic ultrasound here in the emergency department  Ultrasound is unremarkable   Patient does have a history of a migraine with the aura   Stopping Cryselle  Patient given Depo-Provera and discharged with 10 days of Provera 10 mg b.i.d. per the direction of [facility], Dr. Garcia  Patient is stable for discharge to follow up with the regular physician on 11/27  As well as follow up with Dr. Mckeon    I discussed with the patient/family the diagnosis, treatment plan, indications for return to the emergency department, and for expected follow-up. The patient/family verbalized an understanding. The patient/family is asked if there are any questions or concerns. We discuss the case, until all issues are addressed to the patient/familys satisfaction. Patient/family understands and is agreeable to the plan.   Phil Rocha    DISCLAIMER: This note was prepared with [employer] voice recognition transcription software. Garbled syntax, mangled pronouns, and other bizarre constructions may be attributed to that software system            Scribe Attestation:   Scribe #1: I performed the above scribed service and the documentation accurately describes the services I performed. I attest to the accuracy of the note.                             I, Phil Rocha MD, personally performed the services described in this documentation. All medical record entries made by the scribe were at my direction and in my presence. I have reviewed the chart and agree that the record reflects my personal performance and is accurate and complete.      DISCLAIMER: This note was prepared with [employer] voice recognition transcription software. Garbled syntax, mangled pronouns, and other bizarre constructions may be attributed to that software system.   Clinical Impression:  Final diagnoses:  [R42] Lightheadedness  [N93.9] Vaginal bleeding (Primary)          ED Disposition Condition    Discharge Stable          ED  Prescriptions       Medication Sig Dispense Start Date End Date Auth. Provider    medroxyPROGESTERone (PROVERA) 10 MG tablet Take 1 tablet (10 mg total) by mouth once daily. for 10 days 10 tablet 11/19/2024 11/29/2024 Phil Rocha MD          Follow-up Information    None          Phil Rocha MD  11/19/24 7987

## 2024-11-20 VITALS
SYSTOLIC BLOOD PRESSURE: 113 MMHG | RESPIRATION RATE: 16 BRPM | WEIGHT: 137 LBS | DIASTOLIC BLOOD PRESSURE: 67 MMHG | TEMPERATURE: 99 F | HEART RATE: 77 BPM | HEIGHT: 64 IN | OXYGEN SATURATION: 100 % | BODY MASS INDEX: 23.39 KG/M2

## 2024-11-20 LAB
OHS QRS DURATION: 82 MS
OHS QTC CALCULATION: 410 MS

## 2024-11-20 NOTE — ED NOTES
Pt report received from YOUSUF Alba. Pt AAOx4, family at the bedside, updated on plan of care. NADN. LR infusing.

## 2024-11-20 NOTE — ED TRIAGE NOTES
"Pt presented to the ED with complaints of intermittent vaginal bleeding x 1 month. Pt stated she was seen her OB-GYN and her results were "normal". Pt states she was evaluated here at [facility] ED and "was given birth control and methocarbamol". Pt reports using the restroom today and "seen blood clots when wiping". Pt appears anxious upon initial assessment. Pt denies any pain at this time.    "

## 2024-11-20 NOTE — DISCHARGE INSTRUCTIONS
Take Provera 10 mg b.i.d. for 10 days.  Keep your follow up with Dr. Mckeon in your regular physician.  Your regular physician can reassess your bleeding.  Reach out to Dr. Mckeon's clinic to schedule follow up.  You can return for any new or concerning symptoms.  Thank you.

## 2024-11-21 LAB — BACTERIA UR CULT: ABNORMAL

## 2024-11-22 ENCOUNTER — TELEPHONE (OUTPATIENT)
Dept: EMERGENCY MEDICINE | Facility: HOSPITAL | Age: 39
End: 2024-11-22
Payer: MEDICAID

## 2024-11-22 RX ORDER — CEPHALEXIN 500 MG/1
500 CAPSULE ORAL 2 TIMES DAILY
Qty: 14 CAPSULE | Refills: 0 | Status: SHIPPED | OUTPATIENT
Start: 2024-11-22 | End: 2024-11-29

## 2024-11-29 ENCOUNTER — HOSPITAL ENCOUNTER (OUTPATIENT)
Dept: RADIOLOGY | Facility: HOSPITAL | Age: 39
Discharge: HOME | End: 2024-11-29
Attending: OBSTETRICS & GYNECOLOGY
Payer: MEDICAID

## 2024-11-29 ENCOUNTER — TELEPHONE (OUTPATIENT)
Dept: OBSTETRICS AND GYNECOLOGY | Facility: CLINIC | Age: 39
End: 2024-11-29
Payer: MEDICAID

## 2024-11-29 DIAGNOSIS — R10.2 FEMALE PELVIC PAIN: ICD-10-CM

## 2024-11-29 DIAGNOSIS — R10.9 ABDOMINAL PAIN, UNSPECIFIED ABDOMINAL LOCATION: ICD-10-CM

## 2024-11-29 DIAGNOSIS — N20.0 RENAL STONE: ICD-10-CM

## 2024-11-29 PROCEDURE — 76770 US EXAM ABDO BACK WALL COMP: CPT | Mod: TC

## 2024-11-29 PROCEDURE — 76856 US EXAM PELVIC COMPLETE: CPT | Mod: TC

## 2024-11-29 PROCEDURE — 76770 US EXAM ABDO BACK WALL COMP: CPT | Mod: 26,,, | Performed by: RADIOLOGY

## 2024-11-29 PROCEDURE — 76856 US EXAM PELVIC COMPLETE: CPT | Mod: 26,,, | Performed by: RADIOLOGY

## 2024-11-29 NOTE — TELEPHONE ENCOUNTER
Pt scheduled.  ----- Message from Dianne sent at 2024  3:47 PM CST -----  [phone]  Type:  Sooner Appointment Request    Patient is requesting a sooner appointment.  Patient declined first available appointment listed as well as another facility and provider .  Patient will not accept being placed on the waitlist and is requesting a message be sent to doctor.    Name of Caller:  self     When is the first available appointment?      Symptoms: f/u from today's US, asked for [facility] to call her back.    Would the patient rather a call back or a response via My [facility]? Call back     Best Call Back Number:  [phone]

## 2024-11-30 ENCOUNTER — PATIENT MESSAGE (OUTPATIENT)
Dept: OBSTETRICS AND GYNECOLOGY | Facility: CLINIC | Age: 39
End: 2024-11-30
Payer: MEDICAID

## 2024-12-04 ENCOUNTER — OFFICE VISIT (OUTPATIENT)
Dept: OBSTETRICS AND GYNECOLOGY | Facility: CLINIC | Age: 39
End: 2024-12-04
Payer: MEDICAID

## 2024-12-04 VITALS
HEIGHT: 64 IN | WEIGHT: 136.69 LBS | BODY MASS INDEX: 23.34 KG/M2 | SYSTOLIC BLOOD PRESSURE: 130 MMHG | DIASTOLIC BLOOD PRESSURE: 80 MMHG

## 2024-12-04 DIAGNOSIS — R10.2 FEMALE PELVIC PAIN: ICD-10-CM

## 2024-12-04 DIAGNOSIS — N92.1 MENORRHAGIA WITH IRREGULAR CYCLE: Primary | ICD-10-CM

## 2024-12-04 PROCEDURE — 3079F DIAST BP 80-89 MM HG: CPT | Mod: CPTII,,, | Performed by: OBSTETRICS & GYNECOLOGY

## 2024-12-04 PROCEDURE — 99213 OFFICE O/P EST LOW 20 MIN: CPT | Mod: PBBFAC | Performed by: OBSTETRICS & GYNECOLOGY

## 2024-12-04 PROCEDURE — 99999 PR PBB SHADOW E&M-EST. PATIENT-LVL III: CPT | Mod: PBBFAC,,, | Performed by: OBSTETRICS & GYNECOLOGY

## 2024-12-04 PROCEDURE — 3075F SYST BP GE 130 - 139MM HG: CPT | Mod: CPTII,,, | Performed by: OBSTETRICS & GYNECOLOGY

## 2024-12-04 PROCEDURE — 3008F BODY MASS INDEX DOCD: CPT | Mod: CPTII,,, | Performed by: OBSTETRICS & GYNECOLOGY

## 2024-12-04 PROCEDURE — 1160F RVW MEDS BY RX/DR IN RCRD: CPT | Mod: CPTII,,, | Performed by: OBSTETRICS & GYNECOLOGY

## 2024-12-04 PROCEDURE — 1159F MED LIST DOCD IN RCRD: CPT | Mod: CPTII,,, | Performed by: OBSTETRICS & GYNECOLOGY

## 2024-12-04 PROCEDURE — 99213 OFFICE O/P EST LOW 20 MIN: CPT | Mod: S$PBB,,, | Performed by: OBSTETRICS & GYNECOLOGY

## 2024-12-04 NOTE — PROGRESS NOTES
Subjective     Patient ID: Karen Mccall is a 39 y.o. female.    Chief Complaint:  Follow-up (Follow up ultrasound and irregular menses)      History of Present Illness  HPI  Patient comes in today for follow-up  Seen on 24 with menorrhagia and  pelvic pain with poss dysmenorrhea  Placed on Provera 10 mg daily  Now only spotting. But spotting has not stopped.  Lo-Ovral prescribed by the ED doctor.  She had not been taking these pills.    Pelvic ultrasound on 2024 was essentially normal with uterus at 7.8 x 4.2 x 5.4 cm.    No longer smoking. Still vaping and occasionally using MJ      GYN & OB History  Patient's last menstrual period was 2024 (exact date).   Date of Last Pap: 2024    OB History    Para Term  AB Living   5 4 4   1 4   SAB IAB Ectopic Multiple Live Births   1     0 4      # Outcome Date GA Lbr Kemar/2nd Weight Sex Type Anes PTL Lv   5 Term 14 39w2d  2.432 kg (5 lb 5.8 oz) F Vag-Spont EPI N MAYDA   4 Term 10/03/12 39w0d  3.164 kg (6 lb 15.6 oz) M INDUCTION EPI N MAYDA   3 Term 05   3.317 kg (7 lb 5 oz) M Vag-Spont EPI N MAYDA   2 Term 03   3.317 kg (7 lb 5 oz) M Vag-Spont EPI N MAYDA   1 SAB 02             Past Medical History:   Diagnosis Date    Anxiety     Asthma     as a child, no recent episodes    GERD (gastroesophageal reflux disease)     Mental disorder     Methamphetamine abuse     Migraine headache     Smoker     Suicidal ideation     Umbilical hernia     Vaginal delivery     x4       Past Surgical History:   Procedure Laterality Date    DILATION AND CURETTAGE OF UTERUS      HYSTEROSCOPY      TUBAL LIGATION      VAGINAL DELIVERY      x 4       Family History   Problem Relation Name Age of Onset    Cancer Neg Hx      Eclampsia Neg Hx         Social History     Socioeconomic History    Marital status: Single   Tobacco Use    Smoking status: Every Day     Current packs/day: 1.00     Average packs/day: 1 pack/day for 5.0 years (5.0 ttl pk-yrs)      Types: Cigarettes    Smokeless tobacco: Never   Substance and Sexual Activity    Alcohol use: Yes    Drug use: No    Sexual activity: Yes     Partners: Male     Birth control/protection: None   Social History Narrative    Together since summer 2024    She is a homemaker.           Current Outpatient Medications   Medication Sig Dispense Refill    acetaminophen-codeine 300-30mg (TYLENOL #3) 300-30 mg Tab Take 1 tablet by mouth every 12 (twelve) hours as needed (pain). 30 tablet 0    albuterol (PROVENTIL/VENTOLIN HFA) 90 mcg/actuation inhaler INHALE TWO PUFFS BY MOUTH EVERY 6 HOURS AS NEEDED FOR WHEEZING 8.5 g 1    butalbital-acetaminophen-caffeine -40 mg (FIORICET, ESGIC) -40 mg per tablet 1 tab PO 1-2 times a day prn headache 45 tablet 0    clonazePAM (KLONOPIN) 2 MG Tab Take 2 mg by mouth 2 (two) times daily as needed.      cyclobenzaprine (FLEXERIL) 5 MG tablet Take 1 tablet (5 mg total) by mouth nightly as needed. 30 tablet 1    dextroamphetamine-amphetamine (ADDERALL) 20 mg tablet Take 1 tablet by mouth 2 (two) times daily.      fluticasone propionate (FLONASE) 50 mcg/actuation nasal spray 1-2 sprays ( mcg total) by Each Nostril route once daily. 16 g 2    gabapentin (NEURONTIN) 300 MG capsule take ONE CAPSULE BY MOUTH TWICE DAILY 60 capsule 5    meloxicam (MOBIC) 15 MG tablet Take 1 tablet (15 mg total) by mouth once daily. TAKE WITH FOOD 30 tablet 1    ondansetron (ZOFRAN-ODT) 4 MG TbDL Take 1 tablet (4 mg total) by mouth every 8 (eight) hours as needed (nausea). 30 tablet 3    OXcarbazepine (TRILEPTAL) 600 MG Tab Take 1 tablet (600 mg total) by mouth 2 (two) times daily. 60 tablet 5    paroxetine (PAXIL) 20 MG tablet take ONE TABLET BY MOUTH every morning 30 tablet 1    meclizine (ANTIVERT) 25 mg tablet Take 1 tablet (25 mg total) by mouth 3 (three) times daily as needed for Dizziness. (Patient not taking: Reported on 12/4/2024) 30 tablet 0    norgestrel-ethinyl estradioL (LO/OVRAL)  0.3-30 mg-mcg per tablet Take 1 tablet by mouth once daily. 30 tablet 6     No current facility-administered medications for this visit.       Review of patient's allergies indicates:  No Known Allergies    Review of Systems  Review of Systems   Constitutional:  Negative for activity change, appetite change, chills, fatigue, fever and unexpected weight change.   HENT:  Negative for mouth sores.    Respiratory:  Negative for cough, shortness of breath and wheezing.    Cardiovascular:  Negative for chest pain and palpitations.   Gastrointestinal:  Negative for abdominal pain, bloating, blood in stool, constipation, nausea and vomiting.   Endocrine: Negative for diabetes and hot flashes.   Genitourinary:  Positive for menorrhagia, menstrual problem and pelvic pain. Negative for dysmenorrhea, dyspareunia, dysuria, frequency, hematuria, urgency, vaginal bleeding, vaginal discharge, vaginal pain, urinary incontinence, postcoital bleeding and vaginal odor.   Musculoskeletal:  Negative for back pain and myalgias.   Integumentary:  Negative for rash, breast mass and nipple discharge.   Neurological:  Negative for seizures and headaches.   Psychiatric/Behavioral:  Negative for depression and sleep disturbance. The patient is not nervous/anxious.    Breast: Negative for mass, mastodynia and nipple discharge         Objective   Physical Exam:   Constitutional: She appears well-developed and well-nourished. No distress.   BMI of 23.46    HENT:   Head: Normocephalic and atraumatic.    Eyes: EOM are normal.      Pulmonary/Chest: Effort normal. No respiratory distress.                  Musculoskeletal: Normal range of motion.       Neurological: She is alert.    Skin: Skin is warm and dry.    Psychiatric: She has a normal mood and affect.            Assessment and Plan     1. Menorrhagia with irregular cycle    2. Female pelvic pain           Plan:  I have discussed with the patient regarding her condition.  She is doing better    She should finish the Provera, then start on Lo Ovral  Back in 3 months    We also discussed possible hysterectomy.  She did not want surgery at this time

## 2024-12-05 ENCOUNTER — TELEPHONE (OUTPATIENT)
Dept: OBSTETRICS AND GYNECOLOGY | Facility: CLINIC | Age: 39
End: 2024-12-05
Payer: MEDICAID

## 2024-12-05 NOTE — TELEPHONE ENCOUNTER
Called and spoke with pharmacy to resolve issue. jtn    ----- Message from Med Assistant Arceo sent at 12/5/2024  3:35 PM CST -----    ----- Message -----  From: Shasta Hatfield  Sent: 12/5/2024   3:21 PM CST  To: Mike IRVING Staff    Type: RX Refill Request    Who Called: pt     Have you contacted your pharmacy:yes, needs pa    Refill or New Rx:norgestrel-ethinyl estradioL (LO/OVRAL) 0.3-30 mg-mcg per tablet - needs pa    RX Name and Strength:    How is the patient currently taking it? (ex. 1XDay):    Is this a 30 day or 90 day RX:    Preferred Pharmacy with phone number:  [facility] [place] [address] [facility] [facility] [address]  Phone: [phone] Fax: [phone]        Local or Mail Order:    Ordering Provider:    Would the patient rather a call back or a response via [facility]? call    Best Call Back Number:There are no phone numbers on file.  [phone]     Additional Information:

## 2025-03-04 ENCOUNTER — HOSPITAL ENCOUNTER (EMERGENCY)
Facility: HOSPITAL | Age: 40
Discharge: HOME | End: 2025-03-04
Attending: EMERGENCY MEDICINE
Payer: MEDICAID

## 2025-03-04 VITALS
DIASTOLIC BLOOD PRESSURE: 85 MMHG | TEMPERATURE: 98 F | RESPIRATION RATE: 16 BRPM | WEIGHT: 136 LBS | HEART RATE: 98 BPM | BODY MASS INDEX: 22.66 KG/M2 | SYSTOLIC BLOOD PRESSURE: 140 MMHG | OXYGEN SATURATION: 100 % | HEIGHT: 65 IN

## 2025-03-04 DIAGNOSIS — N89.8 VAGINAL IRRITATION: Primary | ICD-10-CM

## 2025-03-04 LAB
B-HCG UR QL: NEGATIVE
BILIRUBIN, POC UA: NEGATIVE
BLOOD, POC UA: ABNORMAL
CLARITY, UA: CLEAR
COLOR, UA: YELLOW
CTP QC/QA: YES
GLUCOSE, POC UA: NEGATIVE
KETONES, POC UA: NEGATIVE
LEUKOCYTE EST, POC UA: ABNORMAL
NITRITE, POC UA: NEGATIVE
PH UR STRIP: 7 [PH] (ref 5–8)
PROTEIN, POC UA: ABNORMAL
SPECIFIC GRAVITY, POC UA: 1.02 (ref 1–1.03)
UROBILINOGEN, POC UA: 1 E.U./DL

## 2025-03-04 PROCEDURE — 81025 URINE PREGNANCY TEST: CPT | Mod: ER

## 2025-03-04 PROCEDURE — 81515 NFCT DS BV&VAGINITIS DNA ALG: CPT

## 2025-03-04 PROCEDURE — 99284 EMERGENCY DEPT VISIT MOD MDM: CPT | Mod: 25,ER

## 2025-03-04 PROCEDURE — 87491 CHLMYD TRACH DNA AMP PROBE: CPT

## 2025-03-04 NOTE — ED PROVIDER NOTES
Encounter Date: 3/4/2025    SCRIBE #1 NOTE: I, Jeremiah Smith, am scribing for, and in the presence of,  Yanique Jones PA-C. I have scribed the following portions of the note - Other sections scribed: HPI, ROS.   SCRIBE #2 NOTE: I, Deny Balbuena Do, am scribing for, and in the presence of,  Yanique Jones PA-C. I have scribed the following portions of the note - Other sections scribed: HPI, ROS.     History     Chief Complaint   Patient presents with    Vaginal Itching     Pt reports itching and burning to vaginal area starting yesterday. Picture supplied by spouse.      Karen Mccall is a 40 y.o. female, with a PMHx of GERD and methamphetamine abuse, who presents to the ED with vaginal itching onset yesterday.  Patient has reported she noticed a raised bump to the outside her vagina that was itchy.  Patient denies any new detergents or soaps.  Patient reports never having symptoms like this before.  No other exacerbating or alleviating factors. Denies fever, abdominal pain, vaginal discharge, nausea, back pain, sore throat, dysuria, or other associated symptoms.     The history is provided by the patient. No  was used.     Review of patient's allergies indicates:  No Known Allergies  Past Medical History:   Diagnosis Date    Anxiety     Asthma     as a child, no recent episodes    GERD (gastroesophageal reflux disease)     Mental disorder     Methamphetamine abuse     Migraine headache     Smoker     Suicidal ideation     Umbilical hernia     Vaginal delivery     x4     Past Surgical History:   Procedure Laterality Date    DILATION AND CURETTAGE OF UTERUS      HYSTEROSCOPY      TUBAL LIGATION      VAGINAL DELIVERY      x 4     Family History   Problem Relation Name Age of Onset    Cancer Neg Hx      Eclampsia Neg Hx       Social History[1]  Review of Systems   Constitutional:  Negative for chills and fever.   HENT:  Negative for congestion and sore throat.    Eyes:  Negative for visual  disturbance.   Respiratory:  Negative for cough and shortness of breath.    Cardiovascular:  Negative for chest pain.   Gastrointestinal:  Negative for abdominal pain, nausea and vomiting.   Genitourinary:  Negative for difficulty urinating, dysuria, flank pain, frequency, hematuria, pelvic pain and vaginal discharge.        (+) vaginal irritation   Musculoskeletal:  Negative for back pain.   Skin:  Negative for rash.   Neurological:  Negative for headaches.   Psychiatric/Behavioral:  Negative for decreased concentration.        Physical Exam     Initial Vitals [03/04/25 1408]   BP Pulse Resp Temp SpO2   (!) 140/85 98 16 97.8 °F (36.6 °C) 100 %      MAP       --         Physical Exam    Nursing note and vitals reviewed.  Constitutional: She appears well-developed and well-nourished. No distress.   HENT:   Head: Normocephalic and atraumatic.   Eyes: Conjunctivae are normal. Right eye exhibits no discharge. Left eye exhibits no discharge. No scleral icterus.   Neck: No tracheal deviation present.   Pulmonary/Chest: No stridor. No respiratory distress.   Abdominal: Abdomen is soft. Bowel sounds are normal. She exhibits no distension. There is no abdominal tenderness. There is no rebound.   Genitourinary:    Genitourinary Comments: Tech chaperone in plain view during entire exam.    Exam notable for:    Otherwise normally developed external female genitalia. Small singular raised lesion was noted to the right lower labia that has broken skin/excoriation marks. No vesicles in clusters or ulcers were appreciated     Vagina and cervix have no lesions, inflammation, discharge, or tenderness. Cervical Os is closed           Musculoskeletal:         General: Normal range of motion.     Neurological: She is alert.   Skin: Skin is warm and dry. No rash noted. No erythema.   Psychiatric: She has a normal mood and affect. Her behavior is normal. Judgment and thought content normal.         ED Course   Procedures  Labs Reviewed    POCT URINALYSIS W/O SCOPE - Abnormal       Result Value    Glucose, UA Negative      Bilirubin, UA Negative      Ketones, UA Negative      Spec Grav UA 1.025      Blood, UA Trace-intact (*)     PH, UA 7.0      Protein, UA 3+ (*)     Urobilinogen, UA 1.0      Nitrite, UA Negative      Leukocytes, UA Trace (*)     Color, UA POC Yellow      Clarity, UA, POC Clear     VAGINOSIS SCREEN BY DNA PROBE   C. TRACHOMATIS/N. GONORRHOEAE BY AMP DNA   POCT URINE PREGNANCY    POC Preg Test, Ur Negative       Acceptable Yes     POCT URINALYSIS W/O SCOPE          Imaging Results    None          Medications - No data to display  Medical Decision Making  This is an emergent evaluation of a 40 y.o. female presenting to the ED for vaginal irritation. Denies abdominal pain and fever. Afebrile. Patient is non-toxic appearing and in no acute distress.  No PID on exam or high suspicion for risk of TOA today.  No vaginal discharge or cervical motion tenderness was noted.  There was a small singular braced lesion to the right lower labia.  There were no vesicles or clusters/ulcers noted.  Feel this area was due to some irritation whether be from detergent, soaps, tight-fitting clothes that patient then scratched.  Do not feel this is HSV at this time.  Chlamydia and GC tests are pending as well as vaginal screen and Trichomonas.  Used shared decision-making with the patient and she chose to not empirically treat today.  States she will wait for the test results come back.  UA showed no signs of infection. No pyelonephritis. Given the above, I have also considered but doubt appendicitis, ovarian torsion, and ovarian cyst rupture.     I discussed with the patient the diagnosis, treatment plan, indications for return to the emergency department, and for expected follow-up.  Instructed patient to refrain from itching area as well as not apply any scented soaps or detergents that may be causing the irritation.  Specifically  advised patient to follow up with OBGYN.The patient verbalized an understanding. The patient is asked if there are any questions or concerns. We discuss the case, until all issues are addressed to the patient's satisfaction. Patient understands and is agreeable to the plan.       Amount and/or Complexity of Data Reviewed  Labs: ordered. Decision-making details documented in ED Course.            Scribe Attestation:   Scribe #1: I performed the above scribed service and the documentation accurately describes the services I performed. I attest to the accuracy of the note.  Scribe #2: I performed the above scribed service and the documentation accurately describes the services I performed. I attest to the accuracy of the note.                           I, Yanique Jones PA-C, personally performed the services described in this documentation. All medical record entries made by the scribe were at my direction and in my presence. I have reviewed the chart and agree that the record reflects my personal performance and is accurate and complete.      Clinical Impression:  Final diagnoses:  [N89.8] Vaginal irritation (Primary)          ED Disposition Condition    Discharge Stable          ED Prescriptions    None       Follow-up Information       Follow up With Specialties Details Why Contact Info    Lyndon Garcia MD Family Medicine Schedule an appointment as soon as possible for a visit in 3 days for follow up [address] [facility] [address] [phone]      [facility] ED Emergency Medicine Go to  If symptoms worsen, As needed, shortness of breath, chest pain, fever, worsening cough, nausea, vomiting, abdominal pain [address] [facility] [address] [phone]                 [1]   Social History  Tobacco Use    Smoking status: Every Day     Current packs/day: 1.00     Average packs/day: 1 pack/day for 5.0 years (5.0 ttl pk-yrs)     Types: Cigarettes    Smokeless tobacco: Never    Substance Use Topics    Alcohol use: Yes    Drug use: No        Yanique Jones, FERNANDO  03/04/25 [id number]

## 2025-03-04 NOTE — DISCHARGE INSTRUCTIONS

## 2025-03-04 NOTE — Clinical Note
"Karen Luque"Jovi Mccall was seen and treated in our emergency department on 3/4/2025.  She may return to work on 03/05/2025.       If you have any questions or concerns, please don't hesitate to call.      Yanique Jones PA-C"

## 2025-03-05 ENCOUNTER — TELEPHONE (OUTPATIENT)
Dept: EMERGENCY MEDICINE | Facility: HOSPITAL | Age: 40
End: 2025-03-05
Payer: MEDICAID

## 2025-03-05 ENCOUNTER — RESULTS FOLLOW-UP (OUTPATIENT)
Dept: EMERGENCY MEDICINE | Facility: HOSPITAL | Age: 40
End: 2025-03-05

## 2025-03-05 DIAGNOSIS — N76.0 BACTERIAL VAGINOSIS: Primary | ICD-10-CM

## 2025-03-05 DIAGNOSIS — B96.89 BACTERIAL VAGINOSIS: Primary | ICD-10-CM

## 2025-03-05 LAB
BACTERIAL VAGINOSIS DNA: DETECTED
C TRACH DNA SPEC QL NAA+PROBE: NOT DETECTED
CANDIDA GLABRATA/KRUSEI: NOT DETECTED
CANDIDA RRNA VAG QL PROBE: NOT DETECTED
N GONORRHOEA DNA SPEC QL NAA+PROBE: NOT DETECTED
TRICHOMONAS VAGINALIS: NOT DETECTED

## 2025-03-05 RX ORDER — METRONIDAZOLE 500 MG/1
500 TABLET ORAL 3 TIMES DAILY
Qty: 21 TABLET | Refills: 0 | Status: SHIPPED | OUTPATIENT
Start: 2025-03-05 | End: 2025-03-12

## 2025-06-04 DIAGNOSIS — N92.1 MENORRHAGIA WITH IRREGULAR CYCLE: ICD-10-CM

## 2025-06-04 DIAGNOSIS — R10.2 FEMALE PELVIC PAIN: ICD-10-CM

## 2025-06-04 RX ORDER — NORGESTREL AND ETHINYL ESTRADIOL 0.3-0.03MG
1 KIT ORAL
Qty: 84 TABLET | Refills: 1 | Status: SHIPPED | OUTPATIENT
Start: 2025-06-04

## 2025-06-13 ENCOUNTER — HOSPITAL ENCOUNTER (EMERGENCY)
Facility: HOSPITAL | Age: 40
Discharge: HOME | End: 2025-06-13
Attending: EMERGENCY MEDICINE
Payer: COMMERCIAL

## 2025-06-13 VITALS
TEMPERATURE: 98 F | HEART RATE: 72 BPM | BODY MASS INDEX: 24.99 KG/M2 | DIASTOLIC BLOOD PRESSURE: 72 MMHG | SYSTOLIC BLOOD PRESSURE: 109 MMHG | WEIGHT: 150 LBS | OXYGEN SATURATION: 99 % | HEIGHT: 65 IN | RESPIRATION RATE: 18 BRPM
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
## 2025-06-13 DIAGNOSIS — F19.929 DRUG INTOXICATION WITH COMPLICATION: Primary | ICD-10-CM

## 2025-06-13 LAB
ABSOLUTE EOSINOPHIL (OHS): 0.15 K/UL
ABSOLUTE MONOCYTE (OHS): 0.36 K/UL (ref 0.3–1)
ABSOLUTE NEUTROPHIL COUNT (OHS): 4.22 K/UL (ref 1.8–7.7)
ALBUMIN SERPL BCP-MCNC: 4.1 G/DL (ref 3.5–5.2)
ALP SERPL-CCNC: 47 UNIT/L (ref 40–150)
ALT SERPL W/O P-5'-P-CCNC: 15 UNIT/L (ref 10–44)
AMPHET UR QL SCN: NEGATIVE
ANION GAP (OHS): 10 MMOL/L (ref 8–16)
AST SERPL-CCNC: 19 UNIT/L (ref 11–45)
B-HCG UR QL: NEGATIVE
BARBITURATE SCN PRESENT UR: NEGATIVE
BASOPHILS # BLD AUTO: 0.06 K/UL
BASOPHILS NFR BLD AUTO: 0.9 %
BENZODIAZ UR QL SCN: ABNORMAL
BILIRUB SERPL-MCNC: 0.4 MG/DL (ref 0.1–1)
BUN SERPL-MCNC: 19 MG/DL (ref 6–20)
CALCIUM SERPL-MCNC: 8.7 MG/DL (ref 8.7–10.5)
CANNABINOIDS UR QL SCN: ABNORMAL
CHLORIDE SERPL-SCNC: 107 MMOL/L (ref 95–110)
CO2 SERPL-SCNC: 21 MMOL/L (ref 23–29)
COCAINE UR QL SCN: ABNORMAL
CREAT SERPL-MCNC: 1 MG/DL (ref 0.5–1.4)
CREAT UR-MCNC: 98.8 MG/DL (ref 15–325)
CTP QC/QA: YES
ERYTHROCYTE [DISTWIDTH] IN BLOOD BY AUTOMATED COUNT: 12.6 % (ref 11.5–14.5)
ETHANOL SERPL-MCNC: <10 MG/DL
GFR SERPLBLD CREATININE-BSD FMLA CKD-EPI: >60 ML/MIN/1.73/M2
GLUCOSE SERPL-MCNC: 88 MG/DL (ref 70–110)
HCT VFR BLD AUTO: 34.9 % (ref 37–48.5)
HGB BLD-MCNC: 11.6 GM/DL (ref 12–16)
IMM GRANULOCYTES # BLD AUTO: 0.02 K/UL (ref 0–0.04)
IMM GRANULOCYTES NFR BLD AUTO: 0.3 % (ref 0–0.5)
LYMPHOCYTES # BLD AUTO: 2.09 K/UL (ref 1–4.8)
MCH RBC QN AUTO: 30.3 PG (ref 27–31)
MCHC RBC AUTO-ENTMCNC: 33.2 G/DL (ref 32–36)
MCV RBC AUTO: 91 FL (ref 82–98)
METHADONE UR QL SCN: NEGATIVE
NUCLEATED RBC (/100WBC) (OHS): 0 /100 WBC
OPIATES UR QL SCN: NEGATIVE
PCP UR QL: NEGATIVE
PLATELET # BLD AUTO: 195 K/UL (ref 150–450)
PMV BLD AUTO: 9.7 FL (ref 9.2–12.9)
POCT GLUCOSE: 85 MG/DL (ref 70–110)
POTASSIUM SERPL-SCNC: 4.3 MMOL/L (ref 3.5–5.1)
PROT SERPL-MCNC: 6.7 GM/DL (ref 6–8.4)
RBC # BLD AUTO: 3.83 M/UL (ref 4–5.4)
RELATIVE EOSINOPHIL (OHS): 2.2 %
RELATIVE LYMPHOCYTE (OHS): 30.3 % (ref 18–48)
RELATIVE MONOCYTE (OHS): 5.2 % (ref 4–15)
RELATIVE NEUTROPHIL (OHS): 61.1 % (ref 38–73)
SODIUM SERPL-SCNC: 138 MMOL/L (ref 136–145)
WBC # BLD AUTO: 6.9 K/UL (ref 3.9–12.7)

## 2025-06-13 PROCEDURE — 80307 DRUG TEST PRSMV CHEM ANLYZR: CPT | Performed by: EMERGENCY MEDICINE

## 2025-06-13 PROCEDURE — 82962 GLUCOSE BLOOD TEST: CPT

## 2025-06-13 PROCEDURE — 99285 EMERGENCY DEPT VISIT HI MDM: CPT | Mod: 25

## 2025-06-13 PROCEDURE — 82077 ASSAY SPEC XCP UR&BREATH IA: CPT | Performed by: EMERGENCY MEDICINE

## 2025-06-13 PROCEDURE — 84155 ASSAY OF PROTEIN SERUM: CPT | Performed by: EMERGENCY MEDICINE

## 2025-06-13 PROCEDURE — 85025 COMPLETE CBC W/AUTO DIFF WBC: CPT | Performed by: EMERGENCY MEDICINE

## 2025-06-13 PROCEDURE — 81025 URINE PREGNANCY TEST: CPT | Performed by: EMERGENCY MEDICINE
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
## 2025-06-13 NOTE — ED NOTES
Pt sitting up in bed sleeping. VSS. C-collar in  place. Call light within reach. Will continue to monitor
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
## 2025-06-13 NOTE — ED NOTES
Pt sitting up in bed in no distress. VSS. Dr Uribe at bedside for results review and discharge plan of care